# Patient Record
Sex: FEMALE | Race: WHITE
[De-identification: names, ages, dates, MRNs, and addresses within clinical notes are randomized per-mention and may not be internally consistent; named-entity substitution may affect disease eponyms.]

---

## 2018-06-04 ENCOUNTER — HOSPITAL ENCOUNTER (INPATIENT)
Dept: HOSPITAL 74 - JER | Age: 83
LOS: 4 days | Discharge: HOME | DRG: 191 | End: 2018-06-08
Attending: INTERNAL MEDICINE | Admitting: INTERNAL MEDICINE
Payer: COMMERCIAL

## 2018-06-04 VITALS — BODY MASS INDEX: 41.3 KG/M2

## 2018-06-04 DIAGNOSIS — E66.9: ICD-10-CM

## 2018-06-04 DIAGNOSIS — E05.90: ICD-10-CM

## 2018-06-04 DIAGNOSIS — K59.00: ICD-10-CM

## 2018-06-04 DIAGNOSIS — Z79.4: ICD-10-CM

## 2018-06-04 DIAGNOSIS — R60.0: ICD-10-CM

## 2018-06-04 DIAGNOSIS — R06.02: ICD-10-CM

## 2018-06-04 DIAGNOSIS — R10.13: ICD-10-CM

## 2018-06-04 DIAGNOSIS — E11.9: ICD-10-CM

## 2018-06-04 DIAGNOSIS — D72.829: ICD-10-CM

## 2018-06-04 DIAGNOSIS — E78.5: ICD-10-CM

## 2018-06-04 DIAGNOSIS — F03.90: ICD-10-CM

## 2018-06-04 DIAGNOSIS — J45.901: ICD-10-CM

## 2018-06-04 DIAGNOSIS — J44.9: Primary | ICD-10-CM

## 2018-06-04 LAB
ALBUMIN SERPL-MCNC: 2.9 G/DL (ref 3.4–5)
ALP SERPL-CCNC: 164 U/L (ref 45–117)
ALT SERPL-CCNC: 25 U/L (ref 12–78)
ANION GAP SERPL CALC-SCNC: 5 MMOL/L (ref 8–16)
APPEARANCE UR: CLEAR
AST SERPL-CCNC: 19 U/L (ref 15–37)
BASOPHILS # BLD: 0.9 % (ref 0–2)
BILIRUB SERPL-MCNC: 0.6 MG/DL (ref 0.2–1)
BILIRUB UR STRIP.AUTO-MCNC: NEGATIVE MG/DL
BUN SERPL-MCNC: 16 MG/DL (ref 7–18)
CALCIUM SERPL-MCNC: 8.5 MG/DL (ref 8.5–10.1)
CHLORIDE SERPL-SCNC: 105 MMOL/L (ref 98–107)
CO2 SERPL-SCNC: 31 MMOL/L (ref 21–32)
COLOR UR: YELLOW
CREAT SERPL-MCNC: 0.8 MG/DL (ref 0.55–1.02)
DEPRECATED RDW RBC AUTO: 16.1 % (ref 11.6–15.6)
EOSINOPHIL # BLD: 2.7 % (ref 0–4.5)
GLUCOSE SERPL-MCNC: 175 MG/DL (ref 74–106)
HCT VFR BLD CALC: 34.8 % (ref 32.4–45.2)
HGB BLD-MCNC: 11.2 GM/DL (ref 10.7–15.3)
KETONES UR QL STRIP: NEGATIVE
LEUKOCYTE ESTERASE UR QL STRIP.AUTO: NEGATIVE
LIPASE SERPL-CCNC: 62 U/L (ref 73–393)
LYMPHOCYTES # BLD: 15.4 % (ref 8–40)
MCH RBC QN AUTO: 27.4 PG (ref 25.7–33.7)
MCHC RBC AUTO-ENTMCNC: 32.2 G/DL (ref 32–36)
MCV RBC: 85 FL (ref 80–96)
MONOCYTES # BLD AUTO: 3.8 % (ref 3.8–10.2)
NEUTROPHILS # BLD: 77.2 % (ref 42.8–82.8)
NITRITE UR QL STRIP: NEGATIVE
PH UR: 6 [PH] (ref 5–8)
PLATELET # BLD AUTO: 230 K/MM3 (ref 134–434)
PMV BLD: 7.4 FL (ref 7.5–11.1)
POTASSIUM SERPLBLD-SCNC: 4.4 MMOL/L (ref 3.5–5.1)
PROT SERPL-MCNC: 6.3 G/DL (ref 6.4–8.2)
PROT UR QL STRIP: NEGATIVE
PROT UR QL STRIP: NEGATIVE
RBC # BLD AUTO: 4.09 M/MM3 (ref 3.6–5.2)
RBC # UR STRIP: NEGATIVE /UL
SODIUM SERPL-SCNC: 141 MMOL/L (ref 136–145)
SP GR UR: 1.01 (ref 1–1.03)
UROBILINOGEN UR STRIP-MCNC: 2 MG/DL (ref 0.2–1)
WBC # BLD AUTO: 8.7 K/MM3 (ref 4–10)

## 2018-06-04 RX ADMIN — IPRATROPIUM BROMIDE AND ALBUTEROL SULFATE SCH AMP: .5; 3 SOLUTION RESPIRATORY (INHALATION) at 12:36

## 2018-06-04 RX ADMIN — IPRATROPIUM BROMIDE AND ALBUTEROL SULFATE SCH AMP: .5; 3 SOLUTION RESPIRATORY (INHALATION) at 16:41

## 2018-06-04 RX ADMIN — METHYLPREDNISOLONE SODIUM SUCCINATE SCH MG: 40 INJECTION, POWDER, FOR SOLUTION INTRAMUSCULAR; INTRAVENOUS at 18:59

## 2018-06-04 RX ADMIN — IPRATROPIUM BROMIDE AND ALBUTEROL SULFATE SCH AMP: .5; 3 SOLUTION RESPIRATORY (INHALATION) at 12:15

## 2018-06-04 RX ADMIN — IPRATROPIUM BROMIDE AND ALBUTEROL SULFATE SCH AMP: .5; 3 SOLUTION RESPIRATORY (INHALATION) at 12:45

## 2018-06-04 RX ADMIN — RANOLAZINE SCH MG: 500 TABLET, FILM COATED, EXTENDED RELEASE ORAL at 23:21

## 2018-06-04 RX ADMIN — IPRATROPIUM BROMIDE AND ALBUTEROL SULFATE SCH AMP: .5; 3 SOLUTION RESPIRATORY (INHALATION) at 13:10

## 2018-06-04 RX ADMIN — INSULIN ASPART SCH UNIT: 100 INJECTION, SOLUTION INTRAVENOUS; SUBCUTANEOUS at 17:34

## 2018-06-04 RX ADMIN — HEPARIN SODIUM SCH UNIT: 5000 INJECTION, SOLUTION INTRAVENOUS; SUBCUTANEOUS at 23:20

## 2018-06-04 RX ADMIN — INSULIN ASPART SCH UNIT: 100 INJECTION, SOLUTION INTRAVENOUS; SUBCUTANEOUS at 23:21

## 2018-06-04 RX ADMIN — IPRATROPIUM BROMIDE AND ALBUTEROL SULFATE SCH AMP: .5; 3 SOLUTION RESPIRATORY (INHALATION) at 21:00

## 2018-06-04 NOTE — HP
Admitting History and Physical





- Primary Care Physician


PCP: Virgilio Neumann





- Admission


Chief Complaint: sob and coughing worsening for last 2 weeks


History of Present Illness: 





Ms. Balderas is an 81 yo female w/ pmh of HTN, HLD, COPD, DM, asthma, 

hypothyroidism who presents complaining of 2 week history of shortness of 

breath she describes "feels like she is drowning." She endorses additional 

yellow sputum and nasal congestion. Ms. Balderas reports she had gone to PCP 

friday, however represented today as she was still having symptoms


per family member she was seen by PCP a couple of times in last 2 weeks and 

after her last visit when family did not see improvement brought her to ER


they report worsening cough and shortness of breath





In the ER


patient got


zithromax iv


magnesium iv,


duonebs


prednisone 60mg


History Source: Family Member


Limitations to Obtaining History: Language Barrier





- Past Medical History


Cardiovascular: Yes: HTN, Hyperlipdemia


Pulmonary: Yes: COPD


Endocrine: Yes: Diabetes Mellitus, Hyperthyroidism





- Smoking History


Smoking history: Unknown if ever smoked


Aproximately how many cigarettes per day: 0





- Alcohol/Substance Use


Hx Alcohol Use: No





Home Medications





- Allergies


Allergies/Adverse Reactions: 


 Allergies











Allergy/AdvReac Type Severity Reaction Status Date / Time


 


No Known Allergies Allergy   Verified 06/04/18 11:00














- Home Medications


Home Medications: 


Ambulatory Orders





Aspirin [ASA -] 81 mg PO DAILY 02/23/18 


Esomeprazole Magnesium 40 mg PO DAILY 02/23/18 


Ezetimibe [Zetia] 10 mg PO DAILY 02/23/18 


Glimepiride [Amaryl -] 2 mg PO BID 02/23/18 


Insulin Glargine,Hum.rec.anlog [Lantus] 38 unit SQ DAILY 02/23/18 


Memantine HCl [Namenda Xr] 28 mg PO DAILY 02/23/18 


Methimazole [Tapazole -] 10 mg PO DAILY 02/23/18 


Naproxen/Esomeprazole Mag [Vimovo Dr 500-20 mg Tablet] 1 each PO BID 02/23/18 


Pitavastatin Calcium [Livalo] 4 mg PO DAILY 02/23/18 


Ranolazine [Ranexa] 1,000 mg PO BID 02/23/18 


Simvastatin [Zocor -] 20 mg PO HS 02/23/18 


Valsartan [Diovan] 160 mg PO DAILY 02/23/18 











Review of Systems





- Review of Systems


Respiratory: reports: Cough, SOB





Physical Examination


Vital Signs: 


 Vital Signs











Temperature  98.3 F   06/04/18 11:00


 


Pulse Rate  61   06/04/18 14:25


 


Respiratory Rate  18   06/04/18 14:25


 


Blood Pressure  121/98   06/04/18 14:25


 


O2 Sat by Pulse Oximetry (%)  99   06/04/18 14:25











Constitutional: Yes: Calm


Neck: Yes: Trachea Midline


Cardiovascular: Yes: Regular Rate and Rhythm, S1, S2


Respiratory: Yes: Rhonchi, Wheezes


Gastrointestinal: Yes: Normal Bowel Sounds, Soft


Edema: Yes


Neurological: Yes: Alert, Oriented


Labs: 


 CBC, BMP





 06/04/18 12:36 





 06/04/18 12:04 











Imaging





- Results


X-ray: Report Reviewed (cardiomegaly)





Problem List





- Problems


(1) Shortness of breath


Assessment/Plan: 


asthma exacerbation


iv steroids


abx


duonebs


oxygen as needed


pulm eval


Code(s): R06.02 - SHORTNESS OF BREATH   





(2) Diabetes


Assessment/Plan: 


bgm


sliding scale


amaryl and levemir


Code(s): E11.9 - TYPE 2 DIABETES MELLITUS WITHOUT COMPLICATIONS   


Qualifiers: 


   Diabetes mellitus type: type 2 





(3) HLD (hyperlipidemia)


Assessment/Plan: 


zetia


check lipid panel


Code(s): E78.5 - HYPERLIPIDEMIA, UNSPECIFIED   





(4) Hyperthyroidism


Assessment/Plan: 


tapazole


cehck tsh and free t4


Code(s): E05.90 - THYROTOXICOSIS, UNSP WITHOUT THYROTOXIC CRISIS OR STORM

## 2018-06-04 NOTE — PDOC
Attending Attestation





- Resident


Resident Name: Dawson Arteaga





- ED Attending Attestation


I have performed the following: I have examined & evaluated the patient, The 

case was reviewed & discussed with the resident, I agree w/resident's findings 

& plan, Exceptions are as noted





- HPI


HPI: 





06/04/18 13:05





82-year-old female patient with history of hypertension, diabetes, 

hyperlipidemia, COPD, asthma, thyroid disorder sent in by primary care physician

, Dr. Neumann, for chest tightness, weakness, shortness of breath. Patient 

reports 2 weeks of symptoms and states that she's been feeling generally 

unwell. Denies fevers or chills but endorses a yellow productive cough that is 

progressively worsened. Patient states that she becomes very short of breath so 

came to the ER for an evaluation.





- Physicial Exam


PE: 





06/04/18 13:05





GENERAL: Awake, alert, and fully oriented, in no acute distress. 


HEAD: No signs of trauma


EYES: EOMI, sclera anicteric, conjunctiva clear


ENT: Auricles normal inspection, hearing grossly normal, nares patent


NECK: Normal ROM, supple


LUNGS: Diffuse expiratory wheezing bilaterally. Occasional ronchorous breath 

sounds bilaterally.


HEART: Regular rate and rhythm, normal S1 and S2, no murmurs, rubs or gallops


EXTREMITIES: Normal range of motion, no edema.  No clubbing or cyanosis. No 

cords, erythema, or tenderness


NEUROLOGICAL: Cranial nerves II through XII grossly intact.  Normal speech,


SKIN: Warm, Dry, normal turgor, no rashes or lesions noted.





- Medical Decision Making





06/04/18 13:07





 Vital Signs











Temp Pulse Resp BP Pulse Ox


 


 98.3 F   62   14   175/74   100 


 


 06/04/18 11:00  06/04/18 11:57  06/04/18 11:57  06/04/18 11:57  06/04/18 11:57








I suspect this is likely having a COPD exacerbation. duonebs, prednisone, IV 

magnesium. We'll likely need to initiate IV antibiotics. Chest x-ray to rule 

out pneumonia. We'll need to admit the patient for further evaluation.


06/04/18 14:34





 CBC, BMP





 06/04/18 12:36 





 06/04/18 12:04 





 CMP











Sodium  141 mmol/L (136-145)   06/04/18  12:04    


 


Potassium  4.4 mmol/L (3.5-5.1)   06/04/18  12:04    


 


Chloride  105 mmol/L ()   06/04/18  12:04    


 


Carbon Dioxide  31 mmol/L (21-32)   06/04/18  12:04    


 


Anion Gap  5  (8-16)  L  06/04/18  12:04    


 


BUN  16 mg/dL (7-18)   06/04/18  12:04    


 


Creatinine  0.8 mg/dL (0.55-1.02)   06/04/18  12:04    


 


Creat Clearance w eGFR  > 60  (>60)   06/04/18  12:04    


 


Random Glucose  175 mg/dL ()  H  06/04/18  12:04    


 


Calcium  8.5 mg/dL (8.5-10.1)   06/04/18  12:04    


 


Total Bilirubin  0.6 mg/dL (0.2-1.0)   06/04/18  12:04    


 


AST  19 U/L (15-37)   06/04/18  12:04    


 


ALT  25 U/L (12-78)   06/04/18  12:04    


 


Alkaline Phosphatase  164 U/L ()  H  06/04/18  12:04    


 


Creatine Kinase  45 IU/L ()   06/04/18  12:04    


 


Troponin I  0.05 ng/ml (0.00-0.05)   06/04/18  12:04    


 


Total Protein  6.3 g/dl (6.4-8.2)  L  06/04/18  12:04    


 


Albumin  2.9 g/dl (3.4-5.0)  L  06/04/18  12:04    


 


Lipase  62 U/L ()  L  06/04/18  12:04    








Chest xray reviewed. No infiltrates.





**Heart Score/ECG Review


  ** #1


ECG reviewed & interpreted by me at: 11:45





06/04/18 13:06


NSR 62, no std/tyler, normal axis, normal intervals, Q wave V1-V2,  msec

## 2018-06-04 NOTE — PDOC
History of Present Illness





- General


Chief Complaint: Shortness of Breath


Stated Complaint: SOB (PCP SENT)


Time Seen by Provider: 06/04/18 11:44





- History of Present Illness


Initial Comments: 





06/04/18 12:34


Ms. Balderas is an 81 yo female w/ pmh of HTN, HLD, COPD, DM, asthma, 

hypothroidism who presents complaining of 2 week history of shortness of breath 

she describes "feels like she is drowning." She endorses additional yellow 

sputum and nasal congestion. Ms. Balderas reports she had gone to PCP friday, 

however represented today as she was still having symptoms.





The patient denies chest pain, headache and dizziness. Denies fever, chills, 

nausea, vomit, diarrhea and constipation. Denies dysuria, frequency, urgency 

and hematuria. 


Allergies: NKDA





Past History





- Past Medical History


Allergies/Adverse Reactions: 


 Allergies











Allergy/AdvReac Type Severity Reaction Status Date / Time


 


No Known Allergies Allergy   Verified 06/04/18 11:00











Home Medications: 


Ambulatory Orders





Aspirin [ASA -] 81 mg PO DAILY 02/23/18 


Esomeprazole Magnesium 40 mg PO DAILY 02/23/18 


Ezetimibe [Zetia] 10 mg PO DAILY 02/23/18 


Glimepiride [Amaryl -] 2 mg PO BID 02/23/18 


Insulin Glargine,Hum.rec.anlog [Lantus] 38 unit SQ DAILY 02/23/18 


Memantine HCl [Namenda Xr] 28 mg PO DAILY 02/23/18 


Methimazole [Tapazole -] 10 mg PO DAILY 02/23/18 


Naproxen/Esomeprazole Mag [Vimovo Dr 500-20 mg Tablet] 1 each PO BID 02/23/18 


Pitavastatin Calcium [Livalo] 4 mg PO DAILY 02/23/18 


Ranolazine [Ranexa] 1,000 mg PO BID 02/23/18 


Simvastatin [Zocor -] 20 mg PO HS 02/23/18 


Valsartan [Diovan] 160 mg PO DAILY 02/23/18 








COPD: No


Dementia: Yes


Diabetes: Yes


HTN: Yes


Hypercholesterolemia: Yes





- Suicide/Smoking/Psychosocial Hx


Smoking Status: No


Smoking History: Unknown if ever smoked


Number of Cigarettes Smoked Daily: 0


Hx Alcohol Use: No


Drug/Substance Use Hx: No


Substance Use Type: None





**Review of Systems





- Review of Systems


Comments:: 





06/04/18 14:13


GENERAL/CONSTITUTIONAL: No fever or chills. No weakness.


HEAD, EYES, EARS, NOSE AND THROAT: No change in vision. No ear pain or 

discharge. No sore throat.


CARDIOVASCULAR: +SOB as described. No chest pain


RESPIRATORY: +Cough productive of yellow sputum. No wheezing, or hemoptysis.


GASTROINTESTINAL: No nausea, vomiting, diarrhea or constipation.


GENITOURINARY: No dysuria, frequency, or change in urination.


MUSCULOSKELETAL: No joint or muscle swelling or pain. No neck or back pain.


SKIN: No rash


NEUROLOGIC: No headache, vertigo, loss of consciousness, or change in strength/

sensation.


ENDOCRINE: No increased thirst. No abnormal weight change


HEMATOLOGIC/LYMPHATIC: No anemia, easy bleeding, or history of blood clots.


ALLERGIC/IMMUNOLOGIC: No hives or skin allergy.





*Physical Exam





- Vital Signs


 Last Vital Signs











Temp Pulse Resp BP Pulse Ox


 


 98.3 F   66   23   155/66   95 


 


 06/04/18 11:00  06/04/18 11:00  06/04/18 11:00  06/04/18 11:00  06/04/18 11:00














- Physical Exam


Comments: 





06/04/18 14:14


GENERAL: Awake, alert, and fully oriented, in no acute distress


HEAD: No signs of trauma, normocephalic, atraumatic 


EYES: PERRLA, EOMI, sclera anicteric, conjunctiva clear


ENT: Auricles normal inspection, hearing grossly normal, nares patent, 

oropharynx clear without


exudates. Moist mucosa


NECK: Normal ROM, supple, no lymphadenopathy, JVD, or masses


LUNGS: +Diffusely wheezy. Speaks full sentences


HEART: Regular rate and rhythm, normal S1 and S2, no murmurs, rubs or gallops, 

peripheral pulses normal and equal bilaterally. 


ABDOMEN: Soft, nontender, normoactive bowel sounds. No guarding, no rebound. No 

masses


EXTREMITIES: Normal inspection, Normal range of motion, no edema. No clubbing 

or cyanosis. 


NEUROLOGICAL: Cranial nerves II through XII grossly intact. Normal speech, 

normal gait, no focal sensorimotor deficits 


SKIN: Warm, Dry, normal turgor, no rashes or lesions noted. 





ED Treatment Course





- LABORATORY


CBC & Chemistry Diagram: 


 06/04/18 12:36





 06/04/18 12:04





Medical Decision Making





- Medical Decision Making





06/04/18 14:15


Ms. Balderas is an 81 yo female w/ pmh as described who presents for evaluation 

of shortness of breath with congestion. Patient somewhat improved with 

supplemental oxygen.





06/04/18 14:41


Labs as below. Patient does not have home O2. Suspect COPD exacerbation as 

origin of symptoms. Admitting patient for further evaluation.








 Laboratory Results - last 24 hr











  06/04/18 06/04/18 06/04/18





  12:04 12:04 12:36


 


WBC    8.7


 


RBC    4.09


 


Hgb    11.2  D


 


Hct    34.8


 


MCV    85.0


 


MCH    27.4  D


 


MCHC    32.2


 


RDW    16.1 H


 


Plt Count    230  D


 


MPV    7.4 L


 


Absolute Neuts (auto)    6.7


 


Neutrophils %    77.2


 


Lymphocytes %    15.4  D


 


Monocytes %    3.8


 


Eosinophils %    2.7


 


Basophils %    0.9


 


Nucleated RBC %    0


 


Sodium  141  


 


Potassium  4.4  


 


Chloride  105  


 


Carbon Dioxide  31  


 


Anion Gap  5 L  


 


BUN  16  


 


Creatinine  0.8  


 


Creat Clearance w eGFR  > 60  


 


Random Glucose  175 H  


 


Calcium  8.5  


 


Total Bilirubin  0.6  


 


AST  19  


 


ALT  25  


 


Alkaline Phosphatase  164 H  


 


Creatine Kinase   45 


 


Troponin I   0.05 


 


Total Protein  6.3 L  


 


Albumin  2.9 L  


 


Lipase  62 L  














*DC/Admit/Observation/Transfer


Diagnosis at time of Disposition: 


 Shortness of breath








- Discharge Dispostion


Decision to Admit order: Yes





- Referrals


Referrals: 


Virgilio Neumann MD [Primary Care Provider] - 





- Patient Instructions





- Post Discharge Activity

## 2018-06-04 NOTE — EKG
Test Reason : 

Blood Pressure : ***/*** mmHG

Vent. Rate : 062 BPM     Atrial Rate : 062 BPM

   P-R Int : 154 ms          QRS Dur : 090 ms

    QT Int : 446 ms       P-R-T Axes : 057 -04 058 degrees

   QTc Int : 452 ms

 

SINUS RHYTHM WITH OCCASIONAL PREMATURE VENTRICULAR COMPLEXES

SEPTAL INFARCT (CITED ON OR BEFORE 23-FEB-2018)

ABNORMAL ECG

WHEN COMPARED WITH ECG OF 23-FEB-2018 13:20,

PREMATURE VENTRICULAR COMPLEXES ARE NOW PRESENT

Confirmed by DIANNE MIRANDA MD ([id number]) on 6/4/2018 12:28:36 PM

 

Referred By:             Confirmed By:DIANNE MIRANDA MD

## 2018-06-05 LAB
ALBUMIN SERPL-MCNC: 2.8 G/DL (ref 3.4–5)
ALP SERPL-CCNC: 183 U/L (ref 45–117)
ALT SERPL-CCNC: 40 U/L (ref 12–78)
ANION GAP SERPL CALC-SCNC: 10 MMOL/L (ref 8–16)
AST SERPL-CCNC: 30 U/L (ref 15–37)
BASOPHILS # BLD: 0.3 % (ref 0–2)
BILIRUB SERPL-MCNC: 0.6 MG/DL (ref 0.2–1)
BNP SERPL-MCNC: 4051.07 PG/ML (ref 5–450)
BUN SERPL-MCNC: 22 MG/DL (ref 7–18)
CALCIUM SERPL-MCNC: 8.8 MG/DL (ref 8.5–10.1)
CHLORIDE SERPL-SCNC: 103 MMOL/L (ref 98–107)
CHOLEST SERPL-MCNC: 96 MG/DL (ref 50–200)
CO2 SERPL-SCNC: 26 MMOL/L (ref 21–32)
CREAT SERPL-MCNC: 0.7 MG/DL (ref 0.55–1.02)
DEPRECATED RDW RBC AUTO: 15.8 % (ref 11.6–15.6)
EOSINOPHIL # BLD: 0 % (ref 0–4.5)
GLUCOSE SERPL-MCNC: 217 MG/DL (ref 74–106)
HCT VFR BLD CALC: 34.1 % (ref 32.4–45.2)
HDLC SERPL-MCNC: 35 MG/DL (ref 40–60)
HGB BLD-MCNC: 10.9 GM/DL (ref 10.7–15.3)
LYMPHOCYTES # BLD: 6.6 % (ref 8–40)
MAGNESIUM SERPL-MCNC: 2.1 MG/DL (ref 1.8–2.4)
MCH RBC QN AUTO: 27.7 PG (ref 25.7–33.7)
MCHC RBC AUTO-ENTMCNC: 32.1 G/DL (ref 32–36)
MCV RBC: 86.3 FL (ref 80–96)
MONOCYTES # BLD AUTO: 0.5 % (ref 3.8–10.2)
NEUTROPHILS # BLD: 92.6 % (ref 42.8–82.8)
PHOSPHATE SERPL-MCNC: 3.6 MG/DL (ref 2.5–4.9)
PLATELET # BLD AUTO: 206 K/MM3 (ref 134–434)
PLATELET BLD QL SMEAR: NORMAL
PMV BLD: 7.5 FL (ref 7.5–11.1)
POTASSIUM SERPLBLD-SCNC: 4.6 MMOL/L (ref 3.5–5.1)
PROT SERPL-MCNC: 6.3 G/DL (ref 6.4–8.2)
RBC # BLD AUTO: 3.95 M/MM3 (ref 3.6–5.2)
SODIUM SERPL-SCNC: 139 MMOL/L (ref 136–145)
TRIGL SERPL-MCNC: 63 MG/DL (ref 35–160)
WBC # BLD AUTO: 7.4 K/MM3 (ref 4–10)

## 2018-06-05 RX ADMIN — METHYLPREDNISOLONE SODIUM SUCCINATE SCH MG: 40 INJECTION, POWDER, FOR SOLUTION INTRAMUSCULAR; INTRAVENOUS at 10:52

## 2018-06-05 RX ADMIN — PANTOPRAZOLE SODIUM SCH MG: 40 TABLET, DELAYED RELEASE ORAL at 08:25

## 2018-06-05 RX ADMIN — IPRATROPIUM BROMIDE AND ALBUTEROL SULFATE SCH AMP: .5; 3 SOLUTION RESPIRATORY (INHALATION) at 11:05

## 2018-06-05 RX ADMIN — IPRATROPIUM BROMIDE AND ALBUTEROL SULFATE SCH AMP: .5; 3 SOLUTION RESPIRATORY (INHALATION) at 16:05

## 2018-06-05 RX ADMIN — INSULIN ASPART SCH UNIT: 100 INJECTION, SOLUTION INTRAVENOUS; SUBCUTANEOUS at 06:41

## 2018-06-05 RX ADMIN — AZITHROMYCIN DIHYDRATE SCH MLS/HR: 500 INJECTION, POWDER, LYOPHILIZED, FOR SOLUTION INTRAVENOUS at 08:24

## 2018-06-05 RX ADMIN — INSULIN DETEMIR SCH UNITS: 100 INJECTION, SOLUTION SUBCUTANEOUS at 23:24

## 2018-06-05 RX ADMIN — METHIMAZOLE SCH MG: 10 TABLET ORAL at 14:56

## 2018-06-05 RX ADMIN — INSULIN ASPART SCH UNIT: 100 INJECTION, SOLUTION INTRAVENOUS; SUBCUTANEOUS at 16:31

## 2018-06-05 RX ADMIN — EZETIMIBE SCH MG: 10 TABLET ORAL at 08:25

## 2018-06-05 RX ADMIN — ASPIRIN 81 MG SCH MG: 81 TABLET ORAL at 10:54

## 2018-06-05 RX ADMIN — GLIMEPIRIDE SCH MG: 2 TABLET ORAL at 06:41

## 2018-06-05 RX ADMIN — IPRATROPIUM BROMIDE AND ALBUTEROL SULFATE SCH AMP: .5; 3 SOLUTION RESPIRATORY (INHALATION) at 08:32

## 2018-06-05 RX ADMIN — RANOLAZINE SCH MG: 500 TABLET, FILM COATED, EXTENDED RELEASE ORAL at 22:12

## 2018-06-05 RX ADMIN — DOCUSATE SODIUM SCH MG: 100 CAPSULE, LIQUID FILLED ORAL at 22:12

## 2018-06-05 RX ADMIN — METHYLPREDNISOLONE SODIUM SUCCINATE SCH MG: 40 INJECTION, POWDER, FOR SOLUTION INTRAMUSCULAR; INTRAVENOUS at 02:00

## 2018-06-05 RX ADMIN — INSULIN ASPART SCH UNIT: 100 INJECTION, SOLUTION INTRAVENOUS; SUBCUTANEOUS at 22:12

## 2018-06-05 RX ADMIN — MEMANTINE SCH MG: 10 TABLET ORAL at 08:25

## 2018-06-05 RX ADMIN — HEPARIN SODIUM SCH UNIT: 5000 INJECTION, SOLUTION INTRAVENOUS; SUBCUTANEOUS at 22:12

## 2018-06-05 RX ADMIN — HEPARIN SODIUM SCH UNIT: 5000 INJECTION, SOLUTION INTRAVENOUS; SUBCUTANEOUS at 14:56

## 2018-06-05 RX ADMIN — INSULIN ASPART SCH UNIT: 100 INJECTION, SOLUTION INTRAVENOUS; SUBCUTANEOUS at 11:36

## 2018-06-05 RX ADMIN — VALSARTAN SCH MG: 160 TABLET, FILM COATED ORAL at 08:25

## 2018-06-05 RX ADMIN — METHYLPREDNISOLONE SODIUM SUCCINATE SCH MG: 40 INJECTION, POWDER, FOR SOLUTION INTRAMUSCULAR; INTRAVENOUS at 18:28

## 2018-06-05 RX ADMIN — RANOLAZINE SCH MG: 500 TABLET, FILM COATED, EXTENDED RELEASE ORAL at 10:53

## 2018-06-05 RX ADMIN — IPRATROPIUM BROMIDE AND ALBUTEROL SULFATE SCH AMP: .5; 3 SOLUTION RESPIRATORY (INHALATION) at 20:25

## 2018-06-05 RX ADMIN — POLYETHYLENE GLYCOL 3350 SCH GM: 17 POWDER, FOR SOLUTION ORAL at 16:27

## 2018-06-05 RX ADMIN — HEPARIN SODIUM SCH UNIT: 5000 INJECTION, SOLUTION INTRAVENOUS; SUBCUTANEOUS at 06:40

## 2018-06-05 NOTE — CON.PULM
Consult


Consult Specialty:: PULMONARY


Referred by:: Dr. Patel


Reason for Consultation:: shortness of breath





- History of Present Illness


Chief Complaint: shortness of breath


History of Present Illness: 





81yo female with h/o HTN, DM, hyperlipidemia, asthma/COPD, hyperthyroidism who 

was admitted with worsening shortness of breath. Reports cough productive of 

yellow sputum and wheezing. No fevers, chills or sweats. No chest pain or 

palpitations. No infiltrates noted on CXR, received antibiotics, prednisone and 

inhaled bronchodilators with some improvement in her symptoms. She is a remote 

smoker. 








- History Source


History Provided By: Patient, Medical Record


Limitations to Obtaining History: Language Barrier





- Past Medical History


Cardio/Vascular: Yes: HTN, Hyperlipdemia


Pulmonary: Yes: COPD


Endocrine: Yes: Diabetes Mellitus, Hyperthyroidism





- Alcohol/Substance Use


Hx Alcohol Use: No





- Smoking History


Smoking history: Never smoked


Have you smoked in the past 12 months: No


Aproximately how many cigarettes per day: 0





Home Medications





- Allergies


Allergies/Adverse Reactions: 


 Allergies











Allergy/AdvReac Type Severity Reaction Status Date / Time


 


No Known Allergies Allergy   Verified 06/04/18 11:00














- Home Medications


Home Medications: 


Ambulatory Orders





Aspirin [ASA -] 81 mg PO DAILY 02/23/18 


Esomeprazole Magnesium 40 mg PO DAILY 02/23/18 


Ezetimibe [Zetia] 10 mg PO DAILY 02/23/18 


Glimepiride [Amaryl -] 2 mg PO BID 02/23/18 


Insulin Glargine,Hum.rec.anlog [Lantus] 38 unit SQ DAILY 02/23/18 


Memantine HCl [Namenda Xr] 28 mg PO DAILY 02/23/18 


Methimazole [Tapazole -] 10 mg PO DAILY 02/23/18 


Naproxen/Esomeprazole Mag [Vimovo Dr 500-20 mg Tablet] 1 each PO BID 02/23/18 


Pitavastatin Calcium [Livalo] 4 mg PO DAILY 02/23/18 


Ranolazine [Ranexa] 1,000 mg PO BID 02/23/18 


Simvastatin [Zocor -] 20 mg PO HS 02/23/18 


Valsartan [Diovan] 160 mg PO DAILY 02/23/18 











Review of Systems





- Review of Systems


Constitutional: reports: Weakness.  denies: Chills, Fever


Eyes: denies: Recent Change in Vision


HENT: denies: Nasal Congestion, Throat Pain


Neck: denies: Stiffness


Cardiovascular: reports: Shortness of Breath.  denies: Chest Pain, Edema, 

Palpitations


Respiratory: reports: Cough, SOB on Exertion, Wheezing.  denies: Hemoptysis


Gastrointestinal: denies: Abdominal Pain, Nausea, Vomiting


Genitourinary: denies: Dysuria, Hematuria


Neurological: denies: Dizziness, Headache


Endocrine: denies: Unexplained Weight Loss





Physical Exam


Vital Sings: 


 Vital Signs











Temperature  96.5 F L  06/05/18 09:39


 


Pulse Rate  63   06/05/18 09:39


 


Respiratory Rate  18   06/05/18 09:39


 


Blood Pressure  148/72   06/05/18 09:39


 


O2 Sat by Pulse Oximetry (%)  96   06/04/18 21:27











Constitutional: Yes: Calm


Eyes: Yes: Conjunctiva Clear, EOM Intact


HENT: Yes: Atraumatic, Normocephalic


Neck: Yes: Supple, Trachea Midline


Cardiovascular: Yes: Regular Rate and Rhythm


Respiratory: Yes: Rhonchi, Wheezes


...Clubbing: No


Gastrointestinal: Yes: Normal Bowel Sounds, Soft.  No: Tenderness


Edema: Yes


Labs: 


 CBC, BMP





 06/05/18 07:30 





 06/05/18 07:30 











Imaging





- Results


Chest X-ray: Report Reviewed, Image Reviewed (no infiltrates)





Problem List





- Problems


(1) Acute asthma exacerbation


Code(s): J45.901 - UNSPECIFIED ASTHMA WITH (ACUTE) EXACERBATION   





(2) Diabetes


Code(s): E11.9 - TYPE 2 DIABETES MELLITUS WITHOUT COMPLICATIONS   


Qualifiers: 


   Diabetes mellitus type: type 2 





(3) HLD (hyperlipidemia)


Code(s): E78.5 - HYPERLIPIDEMIA, UNSPECIFIED   





(4) Hyperthyroidism


Code(s): E05.90 - THYROTOXICOSIS, UNSP WITHOUT THYROTOXIC CRISIS OR STORM   





Assessment/Plan





Acute Asthma Exacerbation


Hyperthyroidism


DM


Hyperlipidemia





-  agree with IV medrol


-  inhaled bronchdilators standing and PRN


-  O2 to keep SpO2 >90%


-  glucose control while on systemic steroids


-  DVT prophylaxis





Thank you for this consult


Vini Grant MD

## 2018-06-05 NOTE — PN
Progress Note, Physician





- Current Medication List


Current Medications: 


Active Medications





Albuterol Sulfate (Ventolin 0.083% Nebulizer Soln -)  1 amp NEB Q4H PRN


   PRN Reason: SHORT OF BREATH/WHEEZING


Albuterol/Ipratropium (Duoneb -)  1 amp NEB RQID [facility]


   Last Admin: 06/05/18 08:32 Dose:  1 amp


Aspirin (Asa -)  81 mg PO DAILY [facility]


   Last Admin: 06/05/18 10:54 Dose:  81 mg


Ezetimibe (Zetia -)  10 mg PO DAILY@0800 [facility]


   Last Admin: 06/05/18 08:25 Dose:  10 mg


Glimepiride (Amaryl -)  2 mg PO DAILY@0700 [facility]


   Last Admin: 06/05/18 06:41 Dose:  2 mg


Heparin Sodium (Porcine) (Heparin -)  5,000 unit SQ TID [facility]


   Last Admin: 06/05/18 06:40 Dose:  5,000 unit


Azithromycin 500 mg/ Dextrose  250 mls @ 250 mls/hr IVPB DAILY@0800 [facility]


   Last Admin: 06/05/18 08:24 Dose:  250 mls/hr


Insulin Aspart (Novolog Vial Sliding Scale -)  1 vial SQ ACHS [facility]; Protocol


   Last Admin: 06/05/18 11:36 Dose:  6 unit


Memantine (Namenda -)  10 mg PO DAILY@0800 [facility]


   Last Admin: 06/05/18 08:25 Dose:  10 mg


Methimazole (Tapazole -)  10 mg PO DAILY [facility]


Methylprednisolone Sodium Succinate (Solu-Medrol -)  40 mg IVPUSH Q8H-IV [facility]


   Last Admin: 06/05/18 10:52 Dose:  40 mg


Pantoprazole Sodium (Protonix -)  40 mg PO DAILY@0800 [facility]


   Last Admin: 06/05/18 08:25 Dose:  40 mg


Ranolazine (Ranexa -)  1,000 mg PO BID [facility]


   Last Admin: 06/05/18 10:53 Dose:  1,000 mg


Valsartan (Diovan -)  160 mg PO DAILY@0800 [facility]


   Last Admin: 06/05/18 08:25 Dose:  160 mg











- Objective


Vital Signs: 


 Vital Signs











Temperature  96.5 F L  06/05/18 09:39


 


Pulse Rate  63   06/05/18 09:39


 


Respiratory Rate  18   06/05/18 09:39


 


Blood Pressure  148/72   06/05/18 09:39


 


O2 Sat by Pulse Oximetry (%)  95   06/05/18 09:00











Constitutional: Yes: Calm


Cardiovascular: Yes: Regular Rate and Rhythm, S1, S2


Respiratory: Yes: Rhonchi, Wheezes


Gastrointestinal: Yes: Normal Bowel Sounds, Soft


Edema: Yes


Neurological: Yes: Alert, Oriented


Labs: 


 CBC, BMP





 06/05/18 07:30 





 06/05/18 07:30 











Problem List





- Problems


(1) Shortness of breath


Assessment/Plan: 


asthma exacerbation


iv steroids


abx


duonebs


oxygen as needed


pulm on board


Code(s): R06.02 - SHORTNESS OF BREATH   





(2) Diabetes


Assessment/Plan: 


bgm


sliding scale


amaryl and levemir


Code(s): E11.9 - TYPE 2 DIABETES MELLITUS WITHOUT COMPLICATIONS   


Qualifiers: 


   Diabetes mellitus type: type 2 





(3) HLD (hyperlipidemia)


Assessment/Plan: 


zetia


check lipid panel


Code(s): E78.5 - HYPERLIPIDEMIA, UNSPECIFIED   





(4) Hyperthyroidism


Assessment/Plan: 


tapazole


cehck tsh and free t4


Code(s): E05.90 - THYROTOXICOSIS, UNSP WITHOUT THYROTOXIC CRISIS OR STORM   





(5) Constipation


Assessment/Plan: 


miralax


colace


Code(s): K59.00 - CONSTIPATION, UNSPECIFIED   





(6) Leg edema


Assessment/Plan: 


iv lasix one dose


venous doppler


Code(s): R60.0 - LOCALIZED EDEMA

## 2018-06-06 LAB
ALBUMIN SERPL-MCNC: 3 G/DL (ref 3.4–5)
ALP SERPL-CCNC: 180 U/L (ref 45–117)
ALT SERPL-CCNC: 57 U/L (ref 12–78)
ANION GAP SERPL CALC-SCNC: 5 MMOL/L (ref 8–16)
ANION GAP SERPL CALC-SCNC: 5 MMOL/L (ref 8–16)
AST SERPL-CCNC: 50 U/L (ref 15–37)
BASOPHILS # BLD: 0 % (ref 0–2)
BILIRUB SERPL-MCNC: 0.5 MG/DL (ref 0.2–1)
BUN SERPL-MCNC: 26 MG/DL (ref 7–18)
BUN SERPL-MCNC: 28 MG/DL (ref 7–18)
CALCIUM SERPL-MCNC: 9.2 MG/DL (ref 8.5–10.1)
CALCIUM SERPL-MCNC: 9.2 MG/DL (ref 8.5–10.1)
CHLORIDE SERPL-SCNC: 98 MMOL/L (ref 98–107)
CHLORIDE SERPL-SCNC: 99 MMOL/L (ref 98–107)
CO2 SERPL-SCNC: 33 MMOL/L (ref 21–32)
CO2 SERPL-SCNC: 34 MMOL/L (ref 21–32)
CREAT SERPL-MCNC: 0.8 MG/DL (ref 0.55–1.02)
CREAT SERPL-MCNC: 0.9 MG/DL (ref 0.55–1.02)
DEPRECATED RDW RBC AUTO: 15.7 % (ref 11.6–15.6)
EOSINOPHIL # BLD: 0 % (ref 0–4.5)
GLUCOSE SERPL-MCNC: 285 MG/DL (ref 74–106)
GLUCOSE SERPL-MCNC: 322 MG/DL (ref 74–106)
HCT VFR BLD CALC: 34.1 % (ref 32.4–45.2)
HGB BLD-MCNC: 11 GM/DL (ref 10.7–15.3)
LYMPHOCYTES # BLD: 4.7 % (ref 8–40)
MCH RBC QN AUTO: 27.7 PG (ref 25.7–33.7)
MCHC RBC AUTO-ENTMCNC: 32.4 G/DL (ref 32–36)
MCV RBC: 85.6 FL (ref 80–96)
MONOCYTES # BLD AUTO: 1.3 % (ref 3.8–10.2)
NEUTROPHILS # BLD: 94 % (ref 42.8–82.8)
PLATELET # BLD AUTO: 229 K/MM3 (ref 134–434)
PMV BLD: 7.9 FL (ref 7.5–11.1)
POTASSIUM SERPLBLD-SCNC: 4.9 MMOL/L (ref 3.5–5.1)
POTASSIUM SERPLBLD-SCNC: 5.2 MMOL/L (ref 3.5–5.1)
PROT SERPL-MCNC: 6.6 G/DL (ref 6.4–8.2)
RBC # BLD AUTO: 3.98 M/MM3 (ref 3.6–5.2)
SODIUM SERPL-SCNC: 136 MMOL/L (ref 136–145)
SODIUM SERPL-SCNC: 138 MMOL/L (ref 136–145)
WBC # BLD AUTO: 10.1 K/MM3 (ref 4–10)

## 2018-06-06 RX ADMIN — IPRATROPIUM BROMIDE AND ALBUTEROL SULFATE SCH AMP: .5; 3 SOLUTION RESPIRATORY (INHALATION) at 20:25

## 2018-06-06 RX ADMIN — DOCUSATE SODIUM SCH MG: 100 CAPSULE, LIQUID FILLED ORAL at 21:59

## 2018-06-06 RX ADMIN — RANOLAZINE SCH MG: 500 TABLET, FILM COATED, EXTENDED RELEASE ORAL at 22:00

## 2018-06-06 RX ADMIN — AZITHROMYCIN DIHYDRATE SCH MLS/HR: 500 INJECTION, POWDER, LYOPHILIZED, FOR SOLUTION INTRAVENOUS at 08:27

## 2018-06-06 RX ADMIN — METHIMAZOLE SCH MG: 10 TABLET ORAL at 09:49

## 2018-06-06 RX ADMIN — METHYLPREDNISOLONE SODIUM SUCCINATE SCH MG: 40 INJECTION, POWDER, FOR SOLUTION INTRAMUSCULAR; INTRAVENOUS at 02:07

## 2018-06-06 RX ADMIN — GUAIFENESIN AND DEXTROMETHORPHAN HYDROBROMIDE PRN ML: 100; 10 SOLUTION ORAL at 22:00

## 2018-06-06 RX ADMIN — INSULIN ASPART SCH UNIT: 100 INJECTION, SOLUTION INTRAVENOUS; SUBCUTANEOUS at 06:36

## 2018-06-06 RX ADMIN — HEPARIN SODIUM SCH UNIT: 5000 INJECTION, SOLUTION INTRAVENOUS; SUBCUTANEOUS at 21:58

## 2018-06-06 RX ADMIN — MEMANTINE SCH MG: 10 TABLET ORAL at 09:47

## 2018-06-06 RX ADMIN — IPRATROPIUM BROMIDE AND ALBUTEROL SULFATE SCH AMP: .5; 3 SOLUTION RESPIRATORY (INHALATION) at 08:20

## 2018-06-06 RX ADMIN — IPRATROPIUM BROMIDE AND ALBUTEROL SULFATE SCH AMP: .5; 3 SOLUTION RESPIRATORY (INHALATION) at 11:37

## 2018-06-06 RX ADMIN — INSULIN ASPART SCH UNIT: 100 INJECTION, SOLUTION INTRAVENOUS; SUBCUTANEOUS at 21:59

## 2018-06-06 RX ADMIN — EZETIMIBE SCH MG: 10 TABLET ORAL at 08:48

## 2018-06-06 RX ADMIN — POLYETHYLENE GLYCOL 3350 SCH GM: 17 POWDER, FOR SOLUTION ORAL at 09:48

## 2018-06-06 RX ADMIN — METHYLPREDNISOLONE SODIUM SUCCINATE SCH MG: 40 INJECTION, POWDER, FOR SOLUTION INTRAMUSCULAR; INTRAVENOUS at 09:49

## 2018-06-06 RX ADMIN — PANTOPRAZOLE SODIUM SCH MG: 40 TABLET, DELAYED RELEASE ORAL at 09:47

## 2018-06-06 RX ADMIN — METHYLPREDNISOLONE SODIUM SUCCINATE SCH MG: 40 INJECTION, POWDER, FOR SOLUTION INTRAMUSCULAR; INTRAVENOUS at 17:28

## 2018-06-06 RX ADMIN — INSULIN DETEMIR SCH UNITS: 100 INJECTION, SOLUTION SUBCUTANEOUS at 21:59

## 2018-06-06 RX ADMIN — INSULIN ASPART SCH UNIT: 100 INJECTION, SOLUTION INTRAVENOUS; SUBCUTANEOUS at 17:30

## 2018-06-06 RX ADMIN — RANOLAZINE SCH MG: 500 TABLET, FILM COATED, EXTENDED RELEASE ORAL at 09:46

## 2018-06-06 RX ADMIN — VALSARTAN SCH MG: 160 TABLET, FILM COATED ORAL at 09:47

## 2018-06-06 RX ADMIN — HEPARIN SODIUM SCH UNIT: 5000 INJECTION, SOLUTION INTRAVENOUS; SUBCUTANEOUS at 09:48

## 2018-06-06 RX ADMIN — IPRATROPIUM BROMIDE AND ALBUTEROL SULFATE SCH AMP: .5; 3 SOLUTION RESPIRATORY (INHALATION) at 16:35

## 2018-06-06 RX ADMIN — INSULIN ASPART SCH UNIT: 100 INJECTION, SOLUTION INTRAVENOUS; SUBCUTANEOUS at 11:42

## 2018-06-06 RX ADMIN — ASPIRIN 81 MG SCH MG: 81 TABLET ORAL at 09:46

## 2018-06-06 RX ADMIN — GLIMEPIRIDE SCH MG: 2 TABLET ORAL at 06:36

## 2018-06-06 NOTE — PN
Progress Note, Physician


History of Present Illness: 





PULMONARY








ALERT,LESS DYSPNEIC,LESS COUGH





- Current Medication List


Current Medications: 


Active Medications





Albuterol Sulfate (Ventolin 0.083% Nebulizer Soln -)  1 amp NEB Q4H PRN


   PRN Reason: SHORT OF BREATH/WHEEZING


Albuterol/Ipratropium (Duoneb -)  1 amp [facility]B [facility]


   Last Admin: 06/06/18 11:37 Dose:  1 amp


Aspirin (Asa -)  81 mg PO DAILY [facility]


   Last Admin: 06/06/18 09:46 Dose:  81 mg


Docusate Sodium (Colace -)  300 mg PO [facility]


   Last Admin: 06/05/18 22:12 Dose:  300 mg


Ezetimibe (Zetia -)  10 mg PO DAILY@0800 [facility]


   Last Admin: 06/06/18 08:48 Dose:  10 mg


Glimepiride (Amaryl -)  2 mg PO DAILY@0700 [facility]


   Last Admin: 06/06/18 06:36 Dose:  2 mg


Heparin Sodium (Porcine) (Heparin -)  5,000 unit SQ BID [facility]


   Last Admin: 06/06/18 09:48 Dose:  5,000 unit


Azithromycin 500 mg/ Dextrose  250 mls @ 250 mls/hr IVPB DAILY@0800 [facility]


   Last Admin: 06/06/18 08:27 Dose:  250 mls/hr


Insulin Aspart (Novolog Vial Sliding Scale -)  1 vial SQ [facility]; [facility]


   Last Admin: 06/06/18 11:42 Dose:  6 unit


Insulin Detemir (Levemir Vial)  20 units SQ [facility]


   Last Admin: 06/05/18 23:24 Dose:  20 units


Memantine (Namenda -)  10 mg PO DAILY@0800 [facility]


   Last Admin: 06/06/18 09:47 Dose:  10 mg


Methimazole (Tapazole -)  10 mg PO DAILY [facility]


   Last Admin: 06/06/18 09:49 Dose:  10 mg


Methylprednisolone Sodium Succinate (Solu-Medrol -)  40 mg IVPUSH Q8H-IV [facility]


   Last Admin: 06/06/18 09:49 Dose:  40 mg


Pantoprazole Sodium (Protonix -)  40 mg PO DAILY@0800 [facility]


   Last Admin: 06/06/18 09:47 Dose:  40 mg


Polyethylene Glycol (Miralax (For Daily Use) -)  17 gm PO DAILY [facility]


   Last Admin: 06/06/18 09:48 Dose:  17 gm


Ranolazine (Ranexa -)  1,000 mg PO BID [facility]


   Last Admin: 06/06/18 09:46 Dose:  1,000 mg


Valsartan (Diovan -)  160 mg PO DAILY@0800 [facility]


   Last Admin: 06/06/18 09:47 Dose:  160 mg











- Objective


Vital Signs: 


 Vital Signs











Temperature  98.0 F   06/06/18 10:00


 


Pulse Rate  71   06/06/18 10:00


 


Respiratory Rate  18 06/06/18 10:00


 


Blood Pressure  136/70   06/06/18 10:00


 


O2 Sat by Pulse Oximetry (%)  90 L  06/06/18 09:00











Constitutional: Yes: Calm, Obese


Eyes: Yes: WNL


HENT: Yes: WNL


Neck: Yes: WNL


Cardiovascular: Yes: Regular Rate and Rhythm, S1, S2


Respiratory: Yes: Wheezes (SCATTERED RAJ WHEEZES)


Gastrointestinal: Yes: Normal Bowel Sounds, Soft


Extremities: Yes: WNL


Edema: No


Labs: 


 CBC, BMP





 06/06/18 06:30 





 06/06/18 12:10 











Assessment/Plan





Problem List





- Problems


(1) Acute asthma exacerbation


Code(s): J45.901 - UNSPECIFIED ASTHMA WITH (ACUTE) EXACERBATION   





(2) Diabetes


Code(s): E11.9 - TYPE 2 DIABETES MELLITUS WITHOUT COMPLICATIONS   


Qualifiers: 


   Diabetes mellitus type: type 2 





(3) HLD (hyperlipidemia)


Code(s): E78.5 - HYPERLIPIDEMIA, UNSPECIFIED   





(4) Hyperthyroidism


Code(s): E05.90 - THYROTOXICOSIS, UNSP WITHOUT THYROTOXIC CRISIS OR STORM   





Assessment/Plan





Acute Asthma Exacerbation improving


Hyperthyroidism


DM


Hyperlipidemia





-IV medrol same dose


-  inhaled bronchdilators standing and PRN


-  O2 to keep SpO2 >90%


-  glucose control while on systemic steroids


-  DVT prophylaxis





DR RENTERIA

## 2018-06-06 NOTE — PN
Progress Note, Physician


Chief Complaint: 





AWAKE ALERT


NAD








- Current Medication List


Current Medications: 


Active Medications





Albuterol Sulfate (Ventolin 0.083% Nebulizer Soln -)  1 amp NEB Q4H PRN


   PRN Reason: SHORT OF BREATH/WHEEZING


Albuterol/Ipratropium (Duoneb -)  1 amp NEB RQID [facility]


   Last Admin: 06/06/18 08:20 Dose:  1 amp


Aspirin (Asa -)  81 mg PO DAILY [facility]


   Last Admin: 06/06/18 09:46 Dose:  81 mg


Docusate Sodium (Colace -)  300 mg PO [facility]


   Last Admin: 06/05/18 22:12 Dose:  300 mg


Ezetimibe (Zetia -)  10 mg PO DAILY@0800 [facility]


   Last Admin: 06/06/18 08:48 Dose:  10 mg


Glimepiride (Amaryl -)  2 mg PO DAILY@0700 [facility]


   Last Admin: 06/06/18 06:36 Dose:  2 mg


Heparin Sodium (Porcine) (Heparin -)  5,000 unit SQ BID [facility]


   Last Admin: 06/06/18 09:48 Dose:  5,000 unit


Azithromycin 500 mg/ Dextrose  250 mls @ 250 mls/hr IVPB DAILY@0800 [facility]


   Last Admin: 06/06/18 08:27 Dose:  250 mls/hr


Insulin Aspart (Novolog Vial Sliding Scale -)  1 vial SQ [facility]; Protocol


   Last Admin: 06/06/18 06:36 Dose:  6 unit


Insulin Detemir (Levemir Vial)  20 units SQ [facility]


   Last Admin: 06/05/18 23:24 Dose:  20 units


Memantine (Namenda -)  10 mg PO DAILY@0800 [facility]


   Last Admin: 06/06/18 09:47 Dose:  10 mg


Methimazole (Tapazole -)  10 mg PO DAILY [facility]


   Last Admin: 06/06/18 09:49 Dose:  10 mg


Methylprednisolone Sodium Succinate (Solu-Medrol -)  40 mg IVPUSH Q8H-IV [facility]


   Last Admin: 06/06/18 09:49 Dose:  40 mg


Pantoprazole Sodium (Protonix -)  40 mg PO DAILY@0800 [facility]


   Last Admin: 06/06/18 09:47 Dose:  40 mg


Polyethylene Glycol (Miralax (For Daily Use) -)  17 gm PO DAILY [facility]


   Last Admin: 06/06/18 09:48 Dose:  17 gm


Ranolazine (Ranexa -)  1,000 mg PO BID [facility]


   Last Admin: 06/06/18 09:46 Dose:  1,000 mg


Valsartan (Diovan -)  160 mg PO DAILY@0800 [facility]


   Last Admin: 06/06/18 09:47 Dose:  160 mg











- Objective


Vital Signs: 


 Vital Signs











Temperature  98.5 F   06/06/18 06:00


 


Pulse Rate  68   06/06/18 06:00


 


Respiratory Rate  20   06/06/18 06:00


 


Blood Pressure  142/72   06/06/18 06:00


 


O2 Sat by Pulse Oximetry (%)  95   06/05/18 21:00











Constitutional: Yes: Mild Distress


Eyes: Yes: WNL


HENT: Yes: WNL


Neck: Yes: WNL


Cardiovascular: Yes: WNL


Respiratory: Yes: On Nasal O2, SOB


Gastrointestinal: Yes: WNL


Genitourinary: Yes: WNL


Musculoskeletal: Yes: Muscle Weakness


Extremities: Yes: WNL


Edema: No


Peripheral Pulses WNL: Yes


Integumentary: Yes: WNL


Wound/Incision: Yes: Clean/Dry


Neurological: Yes: WNL


...Motor Strength: WNL


Psychiatric: Yes: WNL


Labs: 


 CBC, BMP





 06/06/18 06:30 





 06/06/18 06:30 











Problem List





- Problems


(1) Acute asthma exacerbation


Code(s): J45.901 - UNSPECIFIED ASTHMA WITH (ACUTE) EXACERBATION   





(2) Constipation


Code(s): K59.00 - CONSTIPATION, UNSPECIFIED   





(3) Diabetes


Code(s): E11.9 - TYPE 2 DIABETES MELLITUS WITHOUT COMPLICATIONS   


Qualifiers: 


   Diabetes mellitus type: type 2 





(4) HLD (hyperlipidemia)


Code(s): E78.5 - HYPERLIPIDEMIA, UNSPECIFIED   





(5) Hyperthyroidism


Code(s): E05.90 - THYROTOXICOSIS, UNSP WITHOUT THYROTOXIC CRISIS OR STORM   





(6) Shortness of breath


Code(s): R06.02 - SHORTNESS OF BREATH   





Assessment/Plan





IV STEROIDS


BGM CHECKS


IV ABX


NEBS


02 SUPPORT


DVT PROPHYLAXIS


PULMONARY EVAL

## 2018-06-07 LAB
ANION GAP SERPL CALC-SCNC: 5 MMOL/L (ref 8–16)
BUN SERPL-MCNC: 24 MG/DL (ref 7–18)
CALCIUM SERPL-MCNC: 9.5 MG/DL (ref 8.5–10.1)
CHLORIDE SERPL-SCNC: 98 MMOL/L (ref 98–107)
CO2 SERPL-SCNC: 34 MMOL/L (ref 21–32)
CREAT SERPL-MCNC: 0.7 MG/DL (ref 0.55–1.02)
GLUCOSE SERPL-MCNC: 219 MG/DL (ref 74–106)
POTASSIUM SERPLBLD-SCNC: 4.7 MMOL/L (ref 3.5–5.1)
SODIUM SERPL-SCNC: 137 MMOL/L (ref 136–145)

## 2018-06-07 RX ADMIN — VALSARTAN SCH MG: 160 TABLET, FILM COATED ORAL at 08:52

## 2018-06-07 RX ADMIN — METHYLPREDNISOLONE SODIUM SUCCINATE SCH MG: 40 INJECTION, POWDER, FOR SOLUTION INTRAMUSCULAR; INTRAVENOUS at 21:47

## 2018-06-07 RX ADMIN — INSULIN ASPART SCH UNIT: 100 INJECTION, SOLUTION INTRAVENOUS; SUBCUTANEOUS at 06:40

## 2018-06-07 RX ADMIN — HEPARIN SODIUM SCH UNIT: 5000 INJECTION, SOLUTION INTRAVENOUS; SUBCUTANEOUS at 10:36

## 2018-06-07 RX ADMIN — IPRATROPIUM BROMIDE AND ALBUTEROL SULFATE SCH AMP: .5; 3 SOLUTION RESPIRATORY (INHALATION) at 08:21

## 2018-06-07 RX ADMIN — INSULIN ASPART SCH UNIT: 100 INJECTION, SOLUTION INTRAVENOUS; SUBCUTANEOUS at 21:48

## 2018-06-07 RX ADMIN — GUAIFENESIN AND DEXTROMETHORPHAN HYDROBROMIDE PRN ML: 100; 10 SOLUTION ORAL at 10:37

## 2018-06-07 RX ADMIN — POLYETHYLENE GLYCOL 3350 SCH GM: 17 POWDER, FOR SOLUTION ORAL at 10:36

## 2018-06-07 RX ADMIN — INSULIN ASPART SCH UNIT: 100 INJECTION, SOLUTION INTRAVENOUS; SUBCUTANEOUS at 11:43

## 2018-06-07 RX ADMIN — MEMANTINE SCH MG: 10 TABLET ORAL at 08:52

## 2018-06-07 RX ADMIN — HEPARIN SODIUM SCH UNIT: 5000 INJECTION, SOLUTION INTRAVENOUS; SUBCUTANEOUS at 21:47

## 2018-06-07 RX ADMIN — INSULIN ASPART SCH UNIT: 100 INJECTION, SOLUTION INTRAVENOUS; SUBCUTANEOUS at 17:09

## 2018-06-07 RX ADMIN — DOCUSATE SODIUM SCH MG: 100 CAPSULE, LIQUID FILLED ORAL at 21:49

## 2018-06-07 RX ADMIN — EZETIMIBE SCH MG: 10 TABLET ORAL at 08:52

## 2018-06-07 RX ADMIN — INSULIN DETEMIR SCH UNITS: 100 INJECTION, SOLUTION SUBCUTANEOUS at 06:40

## 2018-06-07 RX ADMIN — IPRATROPIUM BROMIDE AND ALBUTEROL SULFATE SCH AMP: .5; 3 SOLUTION RESPIRATORY (INHALATION) at 11:20

## 2018-06-07 RX ADMIN — INSULIN DETEMIR SCH UNITS: 100 INJECTION, SOLUTION SUBCUTANEOUS at 21:48

## 2018-06-07 RX ADMIN — PANTOPRAZOLE SODIUM SCH MG: 40 TABLET, DELAYED RELEASE ORAL at 08:52

## 2018-06-07 RX ADMIN — IPRATROPIUM BROMIDE AND ALBUTEROL SULFATE SCH AMP: .5; 3 SOLUTION RESPIRATORY (INHALATION) at 16:04

## 2018-06-07 RX ADMIN — METHYLPREDNISOLONE SODIUM SUCCINATE SCH MG: 40 INJECTION, POWDER, FOR SOLUTION INTRAMUSCULAR; INTRAVENOUS at 10:36

## 2018-06-07 RX ADMIN — ASPIRIN 81 MG SCH MG: 81 TABLET ORAL at 10:37

## 2018-06-07 RX ADMIN — RANOLAZINE SCH MG: 500 TABLET, FILM COATED, EXTENDED RELEASE ORAL at 21:49

## 2018-06-07 RX ADMIN — RANOLAZINE SCH MG: 500 TABLET, FILM COATED, EXTENDED RELEASE ORAL at 10:37

## 2018-06-07 RX ADMIN — METHIMAZOLE SCH MG: 10 TABLET ORAL at 10:37

## 2018-06-07 RX ADMIN — IPRATROPIUM BROMIDE AND ALBUTEROL SULFATE SCH AMP: .5; 3 SOLUTION RESPIRATORY (INHALATION) at 20:30

## 2018-06-07 RX ADMIN — METHIMAZOLE SCH MG: 10 TABLET ORAL at 22:22

## 2018-06-07 RX ADMIN — METHYLPREDNISOLONE SODIUM SUCCINATE SCH MG: 40 INJECTION, POWDER, FOR SOLUTION INTRAMUSCULAR; INTRAVENOUS at 01:32

## 2018-06-07 RX ADMIN — AZITHROMYCIN DIHYDRATE SCH MLS/HR: 500 INJECTION, POWDER, LYOPHILIZED, FOR SOLUTION INTRAVENOUS at 08:53

## 2018-06-07 NOTE — CONSULT
Consult


Consult Specialty:: endocrine


Referred by:: dr.saba abdullahi


Reason for Consultation:: hyperthyroidism/dm





- History of Present Illness


Chief Complaint: difficulty breathing


History of Present Illness: 





83 yo female w/ pmh of HTN, HLD, COPD, DM, asthma, hyperthroidism who presents 

complaining of 2 week history of shortness of breath she describes "feels like 

she is drowning." She endorses additional yellow sputum and nasal congestion. 

Ms. Balderas reports she had anxiety and tremors,blood sugars higher than normal

,weakness in legs,and arms,no hypoglycemia,nausea or vomiting





- History Source


History Provided By: Patient





- Past Medical History


Cardio/Vascular: Yes: HTN, Hyperlipdemia


Pulmonary: Yes: COPD


Endocrine: Yes: Diabetes Mellitus, Hyperthyroidism





- Alcohol/Substance Use


Hx Alcohol Use: No





- Smoking History


Smoking history: Never smoked


Have you smoked in the past 12 months: No


Aproximately how many cigarettes per day: 0





Home Medications





- Allergies


Allergies/Adverse Reactions: 


 Allergies











Allergy/AdvReac Type Severity Reaction Status Date / Time


 


No Known Allergies Allergy   Verified 06/04/18 11:00














- Home Medications


Home Medications: 


Ambulatory Orders





Aspirin [ASA -] 81 mg PO DAILY 02/23/18 


Glimepiride [Amaryl -] 2 mg PO DAILY 02/23/18 


Methimazole [Tapazole -] 10 mg PO DAILY 02/23/18 


Ranolazine [Ranexa] 1,000 mg PO BID 02/23/18 


Exenatide Microspheres [Bydureon Bcise] 2 mg SQ WEEKLY 06/05/18 


Insulin Degludec [Tresiba Flextouch U-100] 40 unit SQ DAILY 06/05/18 











Review of Systems





- Review of Systems


Constitutional: reports: Loss of Appetite, Unintentional Wgt. Loss, Weakness


Eyes: reports: Blurred Vision


HENT: reports: No Symptoms


Neck: reports: No Symptoms


Cardiovascular: reports: Palpitations, Shortness of Breath


Respiratory: reports: Exercise Intolerance, SOB on Exertion


Gastrointestinal: reports: Bloating


Genitourinary: reports: No Symptoms


Musculoskeletal: reports: Muscle Weakness


Integumentary: reports: No Symptoms


Neurological: reports: Tremors, Weakness


Endocrine: reports: Increased Thirst, Unexplained Weight Loss





Physical Exam


Vital Signs: 


 Vital Signs











Temperature  97.8 F   06/06/18 20:00


 


Pulse Rate  65   06/06/18 20:00


 


Respiratory Rate  19   06/06/18 21:00


 


Blood Pressure  134/73   06/06/18 20:00


 


O2 Sat by Pulse Oximetry (%)  96   06/06/18 21:00











Constitutional: Yes: Anxious


Eyes: Yes: EOM Intact


HENT: Yes: Normocephalic


Neck: Yes: Trachea Midline, Thyromegaly


Cardiovascular: Yes: Tachycardia


Respiratory: Yes: Cough, Rhonchi, SOB


Gastrointestinal: Yes: Abdomen, Obese


...Rectal Exam: Yes: Deferred


Renal/: Yes: WNL


Musculoskeletal: Yes: WNL, Muscle Pain, Muscle Weakness


Extremities: Yes: WNL


Neurological: Yes: Alert, Oriented, Tremors, Weakness


Labs: 


 CBC, BMP





 06/06/18 06:30 





 06/06/18 12:10 











Problem List





- Problems


(1) Acute asthma exacerbation


Code(s): J45.901 - UNSPECIFIED ASTHMA WITH (ACUTE) EXACERBATION   





(2) Constipation


Code(s): K59.00 - CONSTIPATION, UNSPECIFIED   





(3) Diabetes


Code(s): E11.9 - TYPE 2 DIABETES MELLITUS WITHOUT COMPLICATIONS   


Qualifiers: 


   Diabetes mellitus type: type 2 





(4) Hyperthyroidism


Code(s): E05.90 - THYROTOXICOSIS, UNSP WITHOUT THYROTOXIC CRISIS OR STORM   





(5) Leg edema


Code(s): R60.0 - LOCALIZED EDEMA   





(6) Shortness of breath


Code(s): R06.02 - SHORTNESS OF BREATH   





(7) Epigastric pain


Code(s): R10.13 - EPIGASTRIC PAIN   





Assessment/Plan





Current Active Problems





Acute asthma exacerbation (Acute)


Constipation (Acute)


Diabetes (Acute)


HLD (hyperlipidemia) (Acute)


Hyperthyroidism (Acute)


Leg edema (Acute)


Shortness of breath (Acute)





 Abnormal Lab Results











  06/05/18 06/06/18 06/06/18





  07:30 06:30 06:30


 


WBC   10.1 H D 


 


RDW   15.7 H 


 


Neutrophils %   94.0 H 


 


Lymphocytes %   4.7 L D 


 


Monocytes %   1.3 L D 


 


Potassium    5.2 H


 


Carbon Dioxide    34 H


 


Anion Gap    5 L


 


BUN    28 H


 


Random Glucose    285 H


 


Hemoglobin A1c %  7.9 H  


 


AST    50 H


 


Alkaline Phosphatase    180 H


 


Albumin    3.0 L














  06/06/18





  12:10


 


WBC 


 


RDW 


 


Neutrophils % 


 


Lymphocytes % 


 


Monocytes % 


 


Potassium 


 


Carbon Dioxide  33 H


 


Anion Gap  5 L


 


BUN  26 H


 


Random Glucose  322 H*


 


Hemoglobin A1c % 


 


AST 


 


Alkaline Phosphatase 


 


Albumin 








 Laboratory Results - last 24 hr











  06/05/18 06/06/18 06/06/18





  07:30 06:01 06:30


 


WBC    10.1 H D


 


RBC    3.98


 


Hgb    11.0


 


Hct    34.1


 


MCV    85.6


 


MCH    27.7


 


MCHC    32.4


 


RDW    15.7 H


 


Plt Count    229


 


MPV    7.9


 


Absolute Neuts (auto)    9.5


 


Neutrophils %    94.0 H


 


Lymphocytes %    4.7 L D


 


Monocytes %    1.3 L D


 


Eosinophils %    0.0


 


Basophils %    0.0


 


Nucleated RBC %    0


 


Sodium   


 


Potassium   


 


Chloride   


 


Carbon Dioxide   


 


Anion Gap   


 


BUN   


 


Creatinine   


 


Creat Clearance w eGFR   


 


POC Glucometer   306 


 


Random Glucose   


 


Hemoglobin A1c %  7.9 H  


 


Calcium   


 


Total Bilirubin   


 


AST   


 


ALT   


 


Alkaline Phosphatase   


 


Total Protein   


 


Albumin   














  06/06/18 06/06/18 06/06/18





  06:30 11:32 12:10


 


WBC   


 


RBC   


 


Hgb   


 


Hct   


 


MCV   


 


MCH   


 


MCHC   


 


RDW   


 


Plt Count   


 


MPV   


 


Absolute Neuts (auto)   


 


Neutrophils %   


 


Lymphocytes %   


 


Monocytes %   


 


Eosinophils %   


 


Basophils %   


 


Nucleated RBC %   


 


Sodium  138   136


 


Potassium  5.2 H   4.9


 


Chloride  99   98


 


Carbon Dioxide  34 H   33 H


 


Anion Gap  5 L   5 L


 


BUN  28 H   26 H


 


Creatinine  0.9   0.8


 


Creat Clearance w eGFR  59.94  


 


POC Glucometer   325 


 


Random Glucose  285 H   322 H*


 


Hemoglobin A1c %   


 


Calcium  9.2   9.2


 


Total Bilirubin  0.5  


 


AST  50 H  


 


ALT  57  


 


Alkaline Phosphatase  180 H  


 


Total Protein  6.6  


 


Albumin  3.0 L  














  06/06/18 06/06/18





  17:27 21:52


 


WBC  


 


RBC  


 


Hgb  


 


Hct  


 


MCV  


 


MCH  


 


MCHC  


 


RDW  


 


Plt Count  


 


MPV  


 


Absolute Neuts (auto)  


 


Neutrophils %  


 


Lymphocytes %  


 


Monocytes %  


 


Eosinophils %  


 


Basophils %  


 


Nucleated RBC %  


 


Sodium  


 


Potassium  


 


Chloride  


 


Carbon Dioxide  


 


Anion Gap  


 


BUN  


 


Creatinine  


 


Creat Clearance w eGFR  


 


POC Glucometer  237  352


 


Random Glucose  


 


Hemoglobin A1c %  


 


Calcium  


 


Total Bilirubin  


 


AST  


 


ALT  


 


Alkaline Phosphatase  


 


Total Protein  


 


Albumin  








plan:


bgm qid novolog insulin doses


levemir 25 units am


levemir 20 units hs


hyperthyroidism /continue methimazole 10mg bid


aim to normalize tsh

## 2018-06-07 NOTE — PN
Progress Note, Physician


Chief Complaint: 





patient sitting up in chair


coughing is better








- Current Medication List


Current Medications: 


Active Medications





Albuterol Sulfate (Ventolin 0.083% Nebulizer Soln -)  1 amp NEB Q4H PRN


   PRN Reason: SHORT OF BREATH/WHEEZING


Albuterol/Ipratropium (Duoneb -)  1 amp NEB RQID [facility]


   Last Admin: 06/07/18 08:21 Dose:  1 amp


Aspirin (Asa -)  81 mg PO DAILY [facility]


   Last Admin: 06/07/18 10:37 Dose:  81 mg


Docusate Sodium (Colace -)  300 mg PO HS [facility]


   Last Admin: 06/06/18 21:59 Dose:  300 mg


Ezetimibe (Zetia -)  10 mg PO DAILY@0800 [facility]


   Last Admin: 06/07/18 08:52 Dose:  10 mg


Guaifenesin (Diabetic Tussin Dm -)  5 ml PO Q6H PRN


   PRN Reason: COUGH


   Last Admin: 06/07/18 10:37 Dose:  5 ml


Heparin Sodium (Porcine) (Heparin -)  5,000 unit SQ BID [facility]


   Last Admin: 06/07/18 10:36 Dose:  5,000 unit


Azithromycin 500 mg/ Dextrose  250 mls @ 250 mls/hr IVPB DAILY@0800 [facility]


   Last Admin: 06/07/18 08:53 Dose:  250 mls/hr


Insulin Aspart (Novolog Vial Sliding Scale -)  1 vial SQ [facility]; Protocol


   Last Admin: 06/07/18 06:40 Dose:  2 unit


Insulin Detemir (Levemir Vial)  20 units SQ HS [facility]


   Last Admin: 06/06/18 21:59 Dose:  20 units


Insulin Detemir (Levemir Vial)  25 units SQ AM [facility]


   Last Admin: 06/07/18 06:40 Dose:  25 units


Memantine (Namenda -)  10 mg PO DAILY@0800 [facility]


   Last Admin: 06/07/18 08:52 Dose:  10 mg


Methimazole (Tapazole -)  10 mg PO BID [facility]


   Last Admin: 06/07/18 10:37 Dose:  10 mg


Methylprednisolone Sodium Succinate (Solu-Medrol -)  40 mg IVPUSH Q8H-IV [facility]


   Last Admin: 06/07/18 10:36 Dose:  40 mg


Pantoprazole Sodium (Protonix -)  40 mg PO DAILY@0800 [facility]


   Last Admin: 06/07/18 08:52 Dose:  40 mg


Polyethylene Glycol (Miralax (For Daily Use) -)  17 gm PO DAILY [facility]


   Last Admin: 06/07/18 10:36 Dose:  17 gm


Ranolazine (Ranexa -)  1,000 mg PO BID [facility]


   Last Admin: 06/07/18 10:37 Dose:  1,000 mg


Valsartan (Diovan -)  160 mg PO DAILY@0800 [facility]


   Last Admin: 06/07/18 08:52 Dose:  160 mg











- Objective


Vital Signs: 


 Vital Signs











Temperature  97.5 F L  06/07/18 06:00


 


Pulse Rate  64   06/07/18 06:00


 


Respiratory Rate  19   06/07/18 06:00


 


Blood Pressure  143/67   06/07/18 06:00


 


O2 Sat by Pulse Oximetry (%)  96   06/06/18 21:00











Constitutional: Yes: Calm


Cardiovascular: Yes: Regular Rate and Rhythm, S1, S2


Respiratory: Yes: Rhonchi


Gastrointestinal: Yes: Normal Bowel Sounds, Soft


Edema: Yes (slight)


Neurological: Yes: Alert, Oriented


Labs: 


 CBC, BMP





 06/06/18 06:30 





 06/07/18 06:00 











Problem List





- Problems


(1) Shortness of breath


Assessment/Plan: 


asthma exacerbation- improving


iv steroids will change to BID steroids


abx


duonebs


oxygen as needed


pulm on board


Code(s): R06.02 - SHORTNESS OF BREATH   





(2) Diabetes


Assessment/Plan: 


bgm


sliding scale


amaryl and levemir dose adjusted per endocrine


Code(s): E11.9 - TYPE 2 DIABETES MELLITUS WITHOUT COMPLICATIONS   


Qualifiers: 


   Diabetes mellitus type: type 2 





(3) HLD (hyperlipidemia)


Assessment/Plan: 


zetia


check lipid panel noted HDL is low


Code(s): E78.5 - HYPERLIPIDEMIA, UNSPECIFIED   





(4) Hyperthyroidism


Assessment/Plan: 


tapazole 10mg bid


check tsh and free t4- tsh noted


Code(s): E05.90 - THYROTOXICOSIS, UNSP WITHOUT THYROTOXIC CRISIS OR STORM   





(5) Constipation


Assessment/Plan: 


miralax


colace


Code(s): K59.00 - CONSTIPATION, UNSPECIFIED   





(6) Leg edema


Assessment/Plan: 


iv lasix one dose, edema improved


venous doppler no dvt


Code(s): R60.0 - LOCALIZED EDEMA

## 2018-06-07 NOTE — PN
Progress Note (short form)





- Note


Progress Note: 





PULMONARY





States breathing better. Less cough and wheezing.





 Last Vital Signs











Temp Pulse Resp BP Pulse Ox


 


 98.1 F   75   18   140/60   91 L


 


 06/07/18 10:35  06/07/18 10:35  06/07/18 10:35  06/07/18 10:35  06/07/18 08:52








Gen: NAD at rest


Heart: RRR


Lung: bilateral scattered wheezes


Abd: soft, nontender


Ext: no edema





 CBC, BMP





 06/06/18 06:30 





 06/07/18 06:00 





Active Medications





Albuterol Sulfate (Ventolin 0.083% Nebulizer Soln -)  1 amp NEB Q4H PRN


   PRN Reason: SHORT OF BREATH/WHEEZING


Albuterol/Ipratropium (Duoneb -)  1 amp NEB RQID [facility]


   Last Admin: 06/07/18 08:21 Dose:  1 amp


Aspirin (Asa -)  81 mg PO DAILY [facility]


   Last Admin: 06/07/18 10:37 Dose:  81 mg


Docusate Sodium (Colace -)  300 mg PO HS [facility]


   Last Admin: 06/06/18 21:59 Dose:  300 mg


Ezetimibe (Zetia -)  10 mg PO DAILY@0800 [facility]


   Last Admin: 06/07/18 08:52 Dose:  10 mg


Guaifenesin (Diabetic Tussin Dm -)  5 ml PO Q6H PRN


   PRN Reason: COUGH


   Last Admin: 06/07/18 10:37 Dose:  5 ml


Heparin Sodium (Porcine) (Heparin -)  5,000 unit SQ BID [facility]


   Last Admin: 06/07/18 10:36 Dose:  5,000 unit


Azithromycin 500 mg/ Dextrose  250 mls @ 250 mls/hr IVPB DAILY@0800 [facility]


   Last Admin: 06/07/18 08:53 Dose:  250 mls/hr


Insulin Aspart (Novolog Vial Sliding Scale -)  1 vial SQ [facility]; Protocol


   Last Admin: 06/07/18 06:40 Dose:  2 unit


Insulin Detemir (Levemir Vial)  20 units SQ HS [facility]


   Last Admin: 06/06/18 21:59 Dose:  20 units


Insulin Detemir (Levemir Vial)  25 units SQ AM [facility]


   Last Admin: 06/07/18 06:40 Dose:  25 units


Memantine (Namenda -)  10 mg PO DAILY@0800 [facility]


   Last Admin: 06/07/18 08:52 Dose:  10 mg


Methimazole (Tapazole -)  10 mg PO BID [facility]


   Last Admin: 06/07/18 10:37 Dose:  10 mg


Methylprednisolone Sodium Succinate (Solu-Medrol -)  40 mg IVPUSH BID [facility]


Pantoprazole Sodium (Protonix -)  40 mg PO DAILY@0800 [facility]


   Last Admin: 06/07/18 08:52 Dose:  40 mg


Polyethylene Glycol (Miralax (For Daily Use) -)  17 gm PO DAILY [facility]


   Last Admin: 06/07/18 10:36 Dose:  17 gm


Ranolazine (Ranexa -)  1,000 mg PO BID [facility]


   Last Admin: 06/07/18 10:37 Dose:  1,000 mg


Valsartan (Diovan -)  160 mg PO DAILY@0800 [facility]


   Last Admin: 06/07/18 08:52 Dose:  160 mg





A/P


Acute Asthma Exacerbation


Hyperthyroidism


DM


Hyperlipidemia





-  continue medrol


-  can likely changes steroids to PO prednisone 40mg daily in AM and taper as 

outpt


-  inhaled bronchdilators standing and PRN


-  O2 to keep SpO2 >90%


-  glucose control while on systemic steroids


-  DVT prophylaxis








Problem List





- Problems


(1) Acute asthma exacerbation


Code(s): J45.901 - UNSPECIFIED ASTHMA WITH (ACUTE) EXACERBATION   





(2) Diabetes


Code(s): E11.9 - TYPE 2 DIABETES MELLITUS WITHOUT COMPLICATIONS   


Qualifiers: 


   Diabetes mellitus type: type 2 





(3) HLD (hyperlipidemia)


Code(s): E78.5 - HYPERLIPIDEMIA, UNSPECIFIED   





(4) Hyperthyroidism


Code(s): E05.90 - THYROTOXICOSIS, UNSP WITHOUT THYROTOXIC CRISIS OR STORM

## 2018-06-08 VITALS — TEMPERATURE: 98.4 F | HEART RATE: 76 BPM | DIASTOLIC BLOOD PRESSURE: 87 MMHG | SYSTOLIC BLOOD PRESSURE: 154 MMHG

## 2018-06-08 LAB
ALBUMIN SERPL-MCNC: 3.3 G/DL (ref 3.4–5)
ALP SERPL-CCNC: 153 U/L (ref 45–117)
ALT SERPL-CCNC: 74 U/L (ref 12–78)
ANION GAP SERPL CALC-SCNC: 8 MMOL/L (ref 8–16)
AST SERPL-CCNC: 42 U/L (ref 15–37)
BILIRUB SERPL-MCNC: 0.8 MG/DL (ref 0.2–1)
BUN SERPL-MCNC: 22 MG/DL (ref 7–18)
CALCIUM SERPL-MCNC: 10.1 MG/DL (ref 8.5–10.1)
CHLORIDE SERPL-SCNC: 98 MMOL/L (ref 98–107)
CO2 SERPL-SCNC: 31 MMOL/L (ref 21–32)
CREAT SERPL-MCNC: 0.8 MG/DL (ref 0.55–1.02)
DEPRECATED RDW RBC AUTO: 15.8 % (ref 11.6–15.6)
GLUCOSE SERPL-MCNC: 125 MG/DL (ref 74–106)
HCT VFR BLD CALC: 35.6 % (ref 32.4–45.2)
HGB BLD-MCNC: 11.4 GM/DL (ref 10.7–15.3)
MCH RBC QN AUTO: 27.4 PG (ref 25.7–33.7)
MCHC RBC AUTO-ENTMCNC: 32.1 G/DL (ref 32–36)
MCV RBC: 85.2 FL (ref 80–96)
PLATELET # BLD AUTO: 256 K/MM3 (ref 134–434)
PMV BLD: 7.9 FL (ref 7.5–11.1)
POTASSIUM SERPLBLD-SCNC: 4.9 MMOL/L (ref 3.5–5.1)
PROT SERPL-MCNC: 7.1 G/DL (ref 6.4–8.2)
RBC # BLD AUTO: 4.18 M/MM3 (ref 3.6–5.2)
SODIUM SERPL-SCNC: 137 MMOL/L (ref 136–145)
WBC # BLD AUTO: 11.2 K/MM3 (ref 4–10)

## 2018-06-08 RX ADMIN — IPRATROPIUM BROMIDE AND ALBUTEROL SULFATE SCH AMP: .5; 3 SOLUTION RESPIRATORY (INHALATION) at 12:08

## 2018-06-08 RX ADMIN — EZETIMIBE SCH MG: 10 TABLET ORAL at 10:01

## 2018-06-08 RX ADMIN — INSULIN ASPART SCH UNIT: 100 INJECTION, SOLUTION INTRAVENOUS; SUBCUTANEOUS at 12:24

## 2018-06-08 RX ADMIN — RANOLAZINE SCH MG: 500 TABLET, FILM COATED, EXTENDED RELEASE ORAL at 09:59

## 2018-06-08 RX ADMIN — HEPARIN SODIUM SCH UNIT: 5000 INJECTION, SOLUTION INTRAVENOUS; SUBCUTANEOUS at 10:01

## 2018-06-08 RX ADMIN — AZITHROMYCIN DIHYDRATE SCH MLS/HR: 500 INJECTION, POWDER, LYOPHILIZED, FOR SOLUTION INTRAVENOUS at 09:54

## 2018-06-08 RX ADMIN — PANTOPRAZOLE SODIUM SCH MG: 40 TABLET, DELAYED RELEASE ORAL at 10:00

## 2018-06-08 RX ADMIN — INSULIN ASPART SCH UNIT: 100 INJECTION, SOLUTION INTRAVENOUS; SUBCUTANEOUS at 16:44

## 2018-06-08 RX ADMIN — IPRATROPIUM BROMIDE AND ALBUTEROL SULFATE SCH AMP: .5; 3 SOLUTION RESPIRATORY (INHALATION) at 07:30

## 2018-06-08 RX ADMIN — INSULIN ASPART SCH: 100 INJECTION, SOLUTION INTRAVENOUS; SUBCUTANEOUS at 06:19

## 2018-06-08 RX ADMIN — GUAIFENESIN AND DEXTROMETHORPHAN HYDROBROMIDE PRN ML: 100; 10 SOLUTION ORAL at 10:03

## 2018-06-08 RX ADMIN — ASPIRIN 81 MG SCH MG: 81 TABLET ORAL at 09:59

## 2018-06-08 RX ADMIN — METHYLPREDNISOLONE SODIUM SUCCINATE SCH MG: 40 INJECTION, POWDER, FOR SOLUTION INTRAMUSCULAR; INTRAVENOUS at 09:55

## 2018-06-08 RX ADMIN — VALSARTAN SCH MG: 160 TABLET, FILM COATED ORAL at 09:59

## 2018-06-08 RX ADMIN — POLYETHYLENE GLYCOL 3350 SCH GM: 17 POWDER, FOR SOLUTION ORAL at 10:03

## 2018-06-08 RX ADMIN — IPRATROPIUM BROMIDE AND ALBUTEROL SULFATE SCH AMP: .5; 3 SOLUTION RESPIRATORY (INHALATION) at 16:25

## 2018-06-08 RX ADMIN — MEMANTINE SCH MG: 10 TABLET ORAL at 09:59

## 2018-06-08 RX ADMIN — INSULIN DETEMIR SCH UNITS: 100 INJECTION, SOLUTION SUBCUTANEOUS at 06:19

## 2018-06-08 RX ADMIN — METHIMAZOLE SCH MG: 10 TABLET ORAL at 09:59

## 2018-06-08 NOTE — PN
Progress Note (short form)





- Note


Progress Note: 





PULMONARY








 Less cough and wheezing.








VSS/afebrile


Gen: NAD at rest


Heart: RRR


Lung: bilateral scattered wheezes minimal


Abd: soft, nontender


Ext: no edema








labs/meds/notes/images reviewed


A/P


Acute Asthma Exacerbation resolved


Hyperthyroidism


DM


Hyperlipidemia





-  prednisone 40mg daily and taper as outpt


-  inhaled bronchdilators standing and PRN


-  O2 to keep SpO2 >90%


-  glucose control while on systemic steroids


-  DVT prophylaxis


-  No objection to continuing treatment as an outpatient








MARIAMA BLACK MD

## 2018-06-08 NOTE — PN
Progress Note (short form)





- Note


Progress Note: 





askedb y dr wetzel to send prescrription for zithromax 500 mg x 2days as 

she cannot do it at this time

## 2018-06-08 NOTE — DS
Physical Examination


Vital Signs: 


 Vital Signs











Temperature  98.1 F   06/08/18 09:50


 


Pulse Rate  77   06/08/18 09:50


 


Respiratory Rate  18   06/08/18 09:50


 


Blood Pressure  120/60   06/08/18 09:50


 


O2 Sat by Pulse Oximetry (%)  94 L  06/07/18 21:00











Constitutional: Yes: Calm


Cardiovascular: Yes: Regular Rate and Rhythm, S1, S2


Respiratory: Yes: Rhonchi


Gastrointestinal: Yes: Normal Bowel Sounds, Soft


Neurological: Yes: Alert, Oriented


Labs: 


 CBC, BMP





 06/08/18 09:05 





 06/08/18 06:18 











Discharge Summary


Reason For Visit: SOB


Current Active Problems





Acute asthma exacerbation (Acute)


Constipation (Acute)


Diabetes (Acute)


HLD (hyperlipidemia) (Acute)


Hyperthyroidism (Acute)


Leg edema (Acute)


Shortness of breath (Acute)








Other Procedures: doppler of legs: no dvt


Hospital Course: 





- Primary Care Physician


PCP: Virgilio Neumann





- Admission


Chief Complaint: sob and coughing worsening for last 2 weeks


History of Present Illness: 





Ms. Balderas is an 81 yo female w/ pmh of HTN, HLD, COPD, DM, asthma, 

hypothyroidism who presents complaining of 2 week history of shortness of 

breath she describes "feels like she is drowning." She endorses additional 

yellow sputum and nasal congestion. Ms. Balderas reports she had gone to PCP 

friday, however represented today as she was still having symptoms


per family member she was seen by PCP a couple of times in last 2 weeks and 

after her last visit when family did not see improvement brought her to ER


they report worsening cough and shortness of breath





In the ER


patient got


zithromax iv


magnesium iv,


duonebs


prednisone 60mg





acute asthma exacerbationin \iv steroids now change to po


abx course


to go home with [facility]


seen by endocrine for hyperthyroid and DM insulin adjusted


Condition: Improved





- Instructions


Diet, Activity, Other Instructions: 


prednisone 40mg po daily for 2 days,


30mg po daily for 2 days 


20mg po daily for 2 days 


10mg po daily for 2 days and then stop


azithromycin 500mg po daily for another 2 days the n stop


Referrals: 


Virgilio Neumann MD [Primary Care Provider] - 1 Week


Disposition: [facility]/HOME HEALTH CARE





- Home Medications


Comprehensive Discharge Medication List: 


Ambulatory Orders





Aspirin [ASA -] 81 mg PO DAILY 02/23/18 


Glimepiride [Amaryl -] 2 mg PO DAILY 02/23/18 


Methimazole [Tapazole -] 10 mg PO DAILY 02/23/18 


Ranolazine [Ranexa] 1,000 mg PO BID 02/23/18 


Exenatide Microspheres [Bydureon Bcise] 2 mg SQ WEEKLY 06/05/18 


Insulin Degludec [Tresiba Flextouch U-100] 40 unit SQ DAILY 06/05/18

## 2018-11-14 ENCOUNTER — HOSPITAL ENCOUNTER (EMERGENCY)
Dept: HOSPITAL 74 - JER | Age: 83
Discharge: HOME | End: 2018-11-14
Payer: COMMERCIAL

## 2018-11-14 VITALS — BODY MASS INDEX: 37.3 KG/M2

## 2018-11-14 VITALS — DIASTOLIC BLOOD PRESSURE: 55 MMHG | SYSTOLIC BLOOD PRESSURE: 108 MMHG | HEART RATE: 84 BPM | TEMPERATURE: 98.6 F

## 2018-11-14 DIAGNOSIS — Z87.891: ICD-10-CM

## 2018-11-14 DIAGNOSIS — R10.9: Primary | ICD-10-CM

## 2018-11-14 DIAGNOSIS — I10: ICD-10-CM

## 2018-11-14 DIAGNOSIS — E78.00: ICD-10-CM

## 2018-11-14 DIAGNOSIS — E11.9: ICD-10-CM

## 2018-11-14 DIAGNOSIS — I50.9: ICD-10-CM

## 2018-11-14 DIAGNOSIS — J45.909: ICD-10-CM

## 2018-11-14 DIAGNOSIS — E03.9: ICD-10-CM

## 2018-11-14 LAB
ALBUMIN SERPL-MCNC: 3.4 G/DL (ref 3.4–5)
ALP SERPL-CCNC: 172 U/L (ref 45–117)
ALT SERPL-CCNC: 25 U/L (ref 13–61)
ANION GAP SERPL CALC-SCNC: 11 MMOL/L (ref 8–16)
APPEARANCE UR: CLEAR
AST SERPL-CCNC: 25 U/L (ref 15–37)
BASOPHILS # BLD: 0.5 % (ref 0–2)
BILIRUB SERPL-MCNC: 1.2 MG/DL (ref 0.2–1)
BILIRUB UR STRIP.AUTO-MCNC: NEGATIVE MG/DL
BUN SERPL-MCNC: 32 MG/DL (ref 7–18)
CALCIUM SERPL-MCNC: 9.6 MG/DL (ref 8.5–10.1)
CHLORIDE SERPL-SCNC: 99 MMOL/L (ref 98–107)
CO2 SERPL-SCNC: 30 MMOL/L (ref 21–32)
COLOR UR: YELLOW
CREAT SERPL-MCNC: 1.1 MG/DL (ref 0.55–1.3)
DEPRECATED RDW RBC AUTO: 15.7 % (ref 11.6–15.6)
EOSINOPHIL # BLD: 2.7 % (ref 0–4.5)
GLUCOSE SERPL-MCNC: 82 MG/DL (ref 74–106)
HCT VFR BLD CALC: 41.4 % (ref 32.4–45.2)
HGB BLD-MCNC: 13.1 GM/DL (ref 10.7–15.3)
KETONES UR QL STRIP: NEGATIVE
LEUKOCYTE ESTERASE UR QL STRIP.AUTO: NEGATIVE
LIPASE SERPL-CCNC: 77 U/L (ref 73–393)
LYMPHOCYTES # BLD: 22.1 % (ref 8–40)
MAGNESIUM SERPL-MCNC: 1.9 MG/DL (ref 1.8–2.4)
MCH RBC QN AUTO: 25.8 PG (ref 25.7–33.7)
MCHC RBC AUTO-ENTMCNC: 31.6 G/DL (ref 32–36)
MCV RBC: 81.7 FL (ref 80–96)
MONOCYTES # BLD AUTO: 6 % (ref 3.8–10.2)
NEUTROPHILS # BLD: 68.7 % (ref 42.8–82.8)
NITRITE UR QL STRIP: NEGATIVE
PH UR: 5 [PH] (ref 5–8)
PLATELET # BLD AUTO: 202 K/MM3 (ref 134–434)
PMV BLD: 8.4 FL (ref 7.5–11.1)
POTASSIUM SERPLBLD-SCNC: 3.5 MMOL/L (ref 3.5–5.1)
PROT SERPL-MCNC: 6.8 G/DL (ref 6.4–8.2)
PROT UR QL STRIP: (no result)
PROT UR QL STRIP: NEGATIVE
RBC # BLD AUTO: 5.07 M/MM3 (ref 3.6–5.2)
SODIUM SERPL-SCNC: 140 MMOL/L (ref 136–145)
SP GR UR: 1.01 (ref 1.01–1.03)
UROBILINOGEN UR STRIP-MCNC: NEGATIVE MG/DL (ref 0.2–1)
WBC # BLD AUTO: 9.2 K/MM3 (ref 4–10)

## 2018-11-14 PROCEDURE — 3E033NZ INTRODUCTION OF ANALGESICS, HYPNOTICS, SEDATIVES INTO PERIPHERAL VEIN, PERCUTANEOUS APPROACH: ICD-10-PCS

## 2018-11-14 NOTE — PDOC
History of Present Illness





- General


Chief Complaint: Pain


Stated Complaint: abdominal pain


Time Seen by Provider: 18 07:34


History Source: Patient


Exam Limitations: No Limitations





- History of Present Illness


Travel History: No


Initial Comments: 





18 08:00


83-year-old female presents to the emergency department with complaints of left-

sided abdominal cramping increasing in severity over the past few days which 

awoke her this morning from sleep and her daughter said she was noted to be 

talking to a  family member. She states has not had a bowel movement in 

over 3 days and has had decreased appetite for the past 4 days now with 

intermittent chills. Patient denies difficulty urinating, rectal bleeding, 

abdominal distention, decreased flatulence, difficulty breathing, or chest pain.


Timing/Duration: reports: getting worse


Quality: reports: moderate, cramping


Abdominal Pain Onset Location: reports: LUQ, LLQ


Pain Radiation: reports: no radiation


Activities at Onset: reports: none


Aggravating Factors: improves with: None


Alleviating Factors: improves with: None





Past History





- Travel


Traveled outside of the country in the last 30 days: No





- Past Medical History


Allergies/Adverse Reactions: 


 Allergies











Allergy/AdvReac Type Severity Reaction Status Date / Time


 


No Known Allergies Allergy   Verified 18 07:22











Home Medications: 


Ambulatory Orders





Glimepiride [Amaryl -] 4 mg PO BID 18 


Albuterol 0.083% Nebulizer Sol [Ventolin 0.083% Nebulizer Soln -] 1 amp NEB Q4H 

PRN #30 amp MDD 4 18 


Memantine HCl [Namenda -] 10 mg PO DAILY@0800 #30 tablet MDD 1 18 


Brimonidine Tartrate/Timolol [Combigan 0.2%-0.5% Eye Drops] 5 ml OP DAILY  


Exenatide Microspheres [Bydureon Pen] 2 mg SQ WEEKLY 18 


Linaclotide [Linzess] 72 mcg PO DAILY 18 


Methimazole [Tapazole -] 10 mg PO DAILY MDD 2 18 


Pantoprazole Sodium [Protonix] 40 mg PO DAILY 18 


Sacubitril/Valsartan [Entresto 24 mg-26 mg Tablet] 1 each PO BID 18 


Atorvastatin Ca [Lipitor] 20 mg PO HS #30 tablet 18 


Docusate Sodium [Colace -] 100 mg PO Q8H PRN #90 capsule 18 


Furosemide [Lasix -] 40 mg PO DAILY #30 tablet 18 


Linaclotide [Linzess] 72 mcg PO DAILY #30 capsule 18 


Metoprolol Succinate [Toprol XL -] 50 mg PO DAILY #30 tab.sr.24h 18 


Polyethylene Glycol 3350 [Miralax 119 gm Btl -] 17 gm PO DAILY #1 bottle  


Rivaroxaban [Xarelto -] 20 mg PO DAILY@2000 #30 tablet 18 


Sennosides [Senna -] 2 tab PO HS PRN #60 tablet 18 








Asthma: Yes


COPD: Yes


CHF: Yes


Dementia: Yes


Diabetes: Yes (Type 2 DM)


HTN: Yes


Hypercholesterolemia: Yes


Thyroid Disease: Yes (hypothyroidism)





- Suicide/Smoking/Psychosocial Hx


Smoking Status: No


Smoking History: Former smoker


Have you smoked in the past 12 months: No


Number of Cigarettes Smoked Daily: 0


If you are a former smoker, when did you quit?: 60years ago


Information on smoking cessation initiated: No


Hx Alcohol Use: No


Drug/Substance Use Hx: No


Substance Use Type: None


Patient Lives Alone: No


Lives with/in: daughter





Abd/GI Specific PMHX





- Complaint Specific PMHX


Other History: constipation





**Review of Systems





- Review of Systems


Able to Perform ROS?: Yes


Constitutional: Yes: Chills


HEENTM: No: Symptoms Reported


Respiratory: No: Symptoms reported


Cardiac (ROS): No: Symptoms Reported


ABD/GI: Yes: Constipated, Nausea, Poor Appetite, Poor Fluid Intake, Abdominal 

cramping


: No: Symptoms Reported


Musculoskeletal: No: Symptoms Reported


Integumentary: No: Symptoms Reported


Neurological: No: Symptoms reported


Hematologic/Lymphatic: No: Symptoms Reported





*Physical Exam





- Vital Signs


 Last Vital Signs











Temp Pulse Resp BP Pulse Ox


 


 98.6 F   84   18   108/55 L  94 L


 


 18 07:23  18 07:23  18 07:23  18 07:23  18 07:23














- Physical Exam


General Appearance: Yes: Nourished, Appropriately Dressed.  No: Apparent 

Distress


HEENT: positive: EOMI, CURTIS.  negative: Pale Conjunctivae


Neck: positive: Supple


Respiratory/Chest: positive: Lungs Clear, Normal Breath Sounds.  negative: 

Respiratory Distress, Accessory Muscle Use


Cardiovascular: positive: Regular Rhythm, Regular Rate.  negative: Murmur


Gastrointestinal/Abdominal: positive: Normal Bowel Sounds, Soft, Distended (mild

), Tenderness (llq lt periumbilical).  negative: Guarding, Rebound, Mass


Musculoskeletal: negative: CVA Tenderness


Extremity: positive: Normal Capillary Refill, Pedal Edema (trace dieudonne)


Integumentary: positive: Normal Color, Warm, Moist


Neurologic: positive: Normal Mood/Affect (a & o x 3), Motor Strength 5/5





ED Treatment Course





- LABORATORY


CBC & Chemistry Diagram: 


 18 10:38





 18 08:20





- RADIOLOGY


Radiology Studies Ordered: 














 Category Date Time Status


 


 ABDOMEN & PELVIS CT WITH CONTR [CT] Stat CT Scan  18 08:02 Ordered


 


 CHEST X-RAY PORTABLE* [RAD] Stat Radiology  18 08:03 Ordered














Medical Decision Making





- Medical Decision Making





18 08:34


CC: abd pain, constipation, chills x 3-5 days


Exam: abd mild distended, llq pain


Plan: labs, urine, cxr, lactic acid, abd /pelvis ct w/ contrast


18 12:52


 Laboratory Tests











  18





  08:20 08:20 10:38


 


WBC    9.2


 


Hgb    13.1


 


Hct    41.4  D


 


MCV    81.7


 


RDW    15.7 H


 


Neutrophils %    68.7


 


Sodium  140  


 


Potassium  3.5  


 


Chloride  99  


 


Carbon Dioxide  30  


 


Anion Gap  11  


 


BUN  32 H  


 


Creatinine  1.1  


 


Random Glucose  82  


 


Lactic Acid   0.8 


 


Calcium  9.6  


 


Magnesium  1.9  


 


Total Bilirubin  1.2 H  


 


AST  25  


 


ALT  25  


 


Alkaline Phosphatase  172 H  


 


Total Protein  6.8  


 


Albumin  3.4  


 


Lipase  77  


 


Urine Glucose (UA)   


 


Urine Ketones   


 


Urine Urobilinogen   


 


Ur Leukocyte Esterase   














  18





  11:41


 


WBC 


 


Hgb 


 


Hct 


 


MCV 


 


RDW 


 


Neutrophils % 


 


Sodium 


 


Potassium 


 


Chloride 


 


Carbon Dioxide 


 


Anion Gap 


 


BUN 


 


Creatinine 


 


Random Glucose 


 


Lactic Acid 


 


Calcium 


 


Magnesium 


 


Total Bilirubin 


 


AST 


 


ALT 


 


Alkaline Phosphatase 


 


Total Protein 


 


Albumin 


 


Lipase 


 


Urine Glucose (UA)  2+ H


 


Urine Ketones  Negative


 


Urine Urobilinogen  Negative


 


Ur Leukocyte Esterase  Negative








Ct results pending


18 13:58


CT abd shows gallbladder multiple gallstones again seen layering posteriorly 

without evidence of acute cholecystitis. Small left periumbilical/Supra-

periumbilical hernia containing fat/mesentery in the small bowel loops again 

seen without gross evidence of incarceration. There is no evidence of small 

bowel instruction.





*DC/Admit/Observation/Transfer


Diagnosis at time of Disposition: 


 Abdominal pain








- Discharge Dispostion


Disposition: HOME


Condition at time of disposition: Improved





- Referrals


Referrals: 


Harsh Del Real PA [Primary Care Provider] - 





- Patient Instructions


Printed Discharge Instructions:  DI for Abdominal Pain-Adult


Additional Instructions: 


Please eat small frequent meals and increase your fiber intake.


Follow-up with your doctor or return to the ED if your symptoms return or worsen





- Post Discharge Activity

## 2018-11-15 NOTE — EKG
Test Reason : 

Blood Pressure : ***/*** mmHG

Vent. Rate : 081 BPM     Atrial Rate : 243 BPM

   P-R Int : 000 ms          QRS Dur : 088 ms

    QT Int : 372 ms       P-R-T Axes : 080 -36 154 degrees

   QTc Int : 432 ms

 

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

LEFT AXIS DEVIATION

INFERIOR INFARCT , AGE UNDETERMINED

ANTEROSEPTAL INFARCT (CITED ON OR BEFORE 23-FEB-2018)



ABNORMAL ECG

WHEN COMPARED WITH ECG OF 16-AUG-2018 08:27,

ATRIAL FLUTTER HAS REPLACED ATRIAL FIBRILLATION

INFERIOR INFARCT IS NOW PRESENT

Confirmed by TIP MOSS MD ([id number]) on 11/15/2018 11:53:35 AM

 

Referred By:             Confirmed By:TIP MOSS MD

## 2018-11-26 ENCOUNTER — HOSPITAL ENCOUNTER (INPATIENT)
Dept: HOSPITAL 74 - JER | Age: 83
LOS: 10 days | Discharge: HOME | DRG: 445 | End: 2018-12-06
Attending: INTERNAL MEDICINE | Admitting: INTERNAL MEDICINE
Payer: COMMERCIAL

## 2018-11-26 VITALS — BODY MASS INDEX: 36.3 KG/M2

## 2018-11-26 DIAGNOSIS — E03.9: ICD-10-CM

## 2018-11-26 DIAGNOSIS — I48.92: ICD-10-CM

## 2018-11-26 DIAGNOSIS — I12.9: ICD-10-CM

## 2018-11-26 DIAGNOSIS — K80.00: Primary | ICD-10-CM

## 2018-11-26 DIAGNOSIS — E05.90: ICD-10-CM

## 2018-11-26 DIAGNOSIS — R11.2: ICD-10-CM

## 2018-11-26 DIAGNOSIS — J44.9: ICD-10-CM

## 2018-11-26 DIAGNOSIS — E87.6: ICD-10-CM

## 2018-11-26 DIAGNOSIS — R07.89: ICD-10-CM

## 2018-11-26 DIAGNOSIS — E78.5: ICD-10-CM

## 2018-11-26 DIAGNOSIS — E11.22: ICD-10-CM

## 2018-11-26 DIAGNOSIS — R10.13: ICD-10-CM

## 2018-11-26 DIAGNOSIS — E87.1: ICD-10-CM

## 2018-11-26 DIAGNOSIS — N20.0: ICD-10-CM

## 2018-11-26 DIAGNOSIS — E66.9: ICD-10-CM

## 2018-11-26 DIAGNOSIS — N18.2: ICD-10-CM

## 2018-11-26 DIAGNOSIS — R94.8: ICD-10-CM

## 2018-11-26 DIAGNOSIS — N17.9: ICD-10-CM

## 2018-11-26 DIAGNOSIS — I48.91: ICD-10-CM

## 2018-11-26 DIAGNOSIS — I24.8: ICD-10-CM

## 2018-11-26 LAB
ALBUMIN SERPL-MCNC: 3.2 G/DL (ref 3.4–5)
ALP SERPL-CCNC: 133 U/L (ref 45–117)
ALT SERPL-CCNC: 26 U/L (ref 13–61)
ANION GAP SERPL CALC-SCNC: 11 MMOL/L (ref 8–16)
APPEARANCE UR: CLEAR
AST SERPL-CCNC: 37 U/L (ref 15–37)
BACTERIA #/AREA URNS HPF: (no result) /HPF
BASOPHILS # BLD: 1 % (ref 0–2)
BILIRUB SERPL-MCNC: 1.3 MG/DL (ref 0.2–1)
BILIRUB UR STRIP.AUTO-MCNC: NEGATIVE MG/DL
BUN SERPL-MCNC: 57 MG/DL (ref 7–18)
CALCIUM SERPL-MCNC: 9.4 MG/DL (ref 8.5–10.1)
CHLORIDE SERPL-SCNC: 89 MMOL/L (ref 98–107)
CO2 SERPL-SCNC: 31 MMOL/L (ref 21–32)
COLOR UR: (no result)
CREAT SERPL-MCNC: 2.2 MG/DL (ref 0.55–1.3)
DEPRECATED RDW RBC AUTO: 16.2 % (ref 11.6–15.6)
EOSINOPHIL # BLD: 1.6 % (ref 0–4.5)
EPITH CASTS URNS QL MICRO: (no result) /HPF
GLUCOSE SERPL-MCNC: 221 MG/DL (ref 74–106)
HCT VFR BLD CALC: 41.1 % (ref 32.4–45.2)
HGB BLD-MCNC: 14.1 GM/DL (ref 10.7–15.3)
HYALINE CASTS URNS QL MICRO: 1 /LPF
INR BLD: 1.08 (ref 0.83–1.09)
KETONES UR QL STRIP: NEGATIVE
LEUKOCYTE ESTERASE UR QL STRIP.AUTO: (no result)
LIPASE SERPL-CCNC: 125 U/L (ref 73–393)
LYMPHOCYTES # BLD: 23.5 % (ref 8–40)
MAGNESIUM SERPL-MCNC: 2.3 MG/DL (ref 1.8–2.4)
MCH RBC QN AUTO: 27.4 PG (ref 25.7–33.7)
MCHC RBC AUTO-ENTMCNC: 34.2 G/DL (ref 32–36)
MCV RBC: 79.9 FL (ref 80–96)
MONOCYTES # BLD AUTO: 8.4 % (ref 3.8–10.2)
NEUTROPHILS # BLD: 65.5 % (ref 42.8–82.8)
NITRITE UR QL STRIP: NEGATIVE
PH UR: 7 [PH] (ref 5–8)
PLATELET # BLD AUTO: 249 K/MM3 (ref 134–434)
PMV BLD: 8.6 FL (ref 7.5–11.1)
POTASSIUM SERPLBLD-SCNC: 3.8 MMOL/L (ref 3.5–5.1)
PROT SERPL-MCNC: 6.6 G/DL (ref 6.4–8.2)
PROT UR QL STRIP: (no result)
PROT UR QL STRIP: NEGATIVE
PT PNL PPP: 12.7 SEC (ref 9.7–13)
RBC # BLD AUTO: 5.14 M/MM3 (ref 3.6–5.2)
SODIUM SERPL-SCNC: 130 MMOL/L (ref 136–145)
SP GR UR: 1.01 (ref 1.01–1.03)
UROBILINOGEN UR STRIP-MCNC: NEGATIVE MG/DL (ref 0.2–1)
WBC # BLD AUTO: 8.9 K/MM3 (ref 4–10)
YEAST #/AREA URNS HPF: (no result) /[HPF]

## 2018-11-26 PROCEDURE — G0008 ADMIN INFLUENZA VIRUS VAC: HCPCS

## 2018-11-26 PROCEDURE — A9537 TC99M MEBROFENIN: HCPCS

## 2018-11-26 RX ADMIN — ATORVASTATIN CALCIUM SCH MG: 20 TABLET, FILM COATED ORAL at 22:38

## 2018-11-26 NOTE — PDOC
History of Present Illness





- General


History Source: Patient, Family (Daughter)


Exam Limitations: Other (pt and daughter very poor historians, even with 

 service; do not directly answer questions until multiple promptings.)





<Mandy Ayon - Last Filed: 11/26/18 18:51>





<Skylar Carvajal - Last Filed: 11/26/18 21:09>





- General


Chief Complaint: Pain, Acute


Stated Complaint: ABD PAIN


Time Seen by Provider: 11/26/18 15:30





Past History





- Travel


Traveled outside of the country in the last 30 days: No


Close contact w/someone who was outside of country & ill: No





- Past Medical History


Asthma: Yes


COPD: Yes


CHF: Yes


Dementia: Yes


Diabetes: Yes (Type 2 DM)


HTN: Yes


Hypercholesterolemia: Yes


Thyroid Disease: Yes (hypothyroidism)





- Immunization History


Immunization Up to Date: Yes





- Suicide/Smoking/Psychosocial Hx


Smoking Status: No


Smoking History: Unknown if ever smoked


Have you smoked in the past 12 months: No


Number of Cigarettes Smoked Daily: 0


If you are a former smoker, when did you quit?: 60years ago


Information on smoking cessation initiated: No


Hx Alcohol Use: No


Drug/Substance Use Hx: No


Substance Use Type: None





<Mandy Ayon - Last Filed: 11/26/18 18:51>





<Skylar Carvajal - Last Filed: 11/26/18 21:09>





- Past Medical History


Allergies/Adverse Reactions: 


 Allergies











Allergy/AdvReac Type Severity Reaction Status Date / Time


 


No Known Allergies Allergy   Verified 11/26/18 20:09











Home Medications: 


Ambulatory Orders





Albuterol 0.083% Nebulizer Sol [Ventolin 0.083% Nebulizer Soln -] 1 amp NEB Q4H 

PRN #30 amp MDD 4 06/08/18 


Memantine HCl [Namenda -] 10 mg PO DAILY@0800 #30 tablet MDD 1 06/08/18 


Brimonidine Tartrate/Timolol [Combigan 0.2%-0.5% Eye Drops] 5 ml OP DAILY 08/14/ 18 


Exenatide Microspheres [Bydureon Pen] 2 mg SQ WEEKLY 08/14/18 


Methimazole [Tapazole -] 10 mg PO DAILY MDD 2 08/14/18 


Pantoprazole Sodium [Protonix] 40 mg PO DAILY 08/14/18 


Atorvastatin Ca [Lipitor] 20 mg PO HS #30 tablet 08/20/18 


Furosemide [Lasix -] 40 mg PO DAILY #30 tablet 08/20/18 


Rivaroxaban [Xarelto -] 20 mg PO DAILY@2000 #30 tablet 08/20/18 


Amlodipine Besylate [Norvasc -] 10 mg PO DAILY 11/26/18 


Aspirin Coated [Ecotrin -] 81 mg PO DAILY 11/26/18 


Digoxin 125 mcg PO DAILY 11/26/18 


Insulin Glargine,Hum.rec.anlog [Lantus Solostar PEN (NF)] 40 units SQ HS 11/26/ 18 


Metolazone [Zaroxolyn -] 2.5 mg PO DAILY 11/26/18 


Metoprolol Succinate [Toprol XL -] 100 mg PO DAILY 11/26/18 


Omeprazole Magnesium [Acid Reducer] 40 mg PO DAILY 11/26/18 


Ondansetron HCl [Zofran] 4 mg PO TID PRN 11/26/18 


Tramadol HCl/Acetaminophen [Tramadol-Acetaminophn 37.5-325] 1 each PO QID PRN 11 /26/18 











**Review of Systems





- Review of Systems


Able to Perform ROS?: Yes (limited)


Is the patient limited English proficient: Yes


Constitutional: Yes: Loss of Appetite, Weight Stable.  No: Chills, Diaphoresis, 

Fever, Weakness





<Mandy Ayon - Last Filed: 11/26/18 18:51>





*Physical Exam





- Vital Signs


 Last Vital Signs











Temp Pulse Resp BP Pulse Ox


 


 98.0 F   88   16   94/54 L  96 


 


 11/26/18 13:20  11/26/18 13:20  11/26/18 13:20  11/26/18 13:20  11/26/18 13:20














<Mandy Ayon - Last Filed: 11/26/18 18:51>





- Vital Signs


 Last Vital Signs











Temp Pulse Resp BP Pulse Ox


 


 98.0 F   82   17   92/54 L  97 


 


 11/26/18 13:20  11/26/18 20:50  11/26/18 20:50  11/26/18 20:50  11/26/18 20:50














<Skylar Carvajal - Last Filed: 11/26/18 21:09>





ED Treatment Course





- LABORATORY


CBC & Chemistry Diagram: 


 11/26/18 16:30





 11/26/18 16:30





<Mandy Ayon - Last Filed: 11/26/18 18:51>





- LABORATORY


CBC & Chemistry Diagram: 


 11/26/18 16:30





 11/26/18 16:30





- ADDITIONAL ORDERS


Additional order review: 


 Laboratory  Results











  11/26/18 11/26/18 11/26/18





  16:37 16:30 16:30


 


PT with INR    12.70


 


INR    1.08


 


Sodium   130 L 


 


Potassium   3.8 


 


Chloride   89 L 


 


Carbon Dioxide   31 


 


Anion Gap   11 


 


BUN   57 H 


 


Creatinine   2.2 H 


 


Creat Clearance w eGFR   21.32 


 


Random Glucose   221 H 


 


Lactic Acid  2.1 H  


 


Calcium   9.4 


 


Magnesium   2.3 


 


Total Bilirubin   1.3 H 


 


AST   37 


 


ALT   26 


 


Alkaline Phosphatase   133 H 


 


Troponin I   0.19 H 


 


Total Protein   6.6 


 


Albumin   3.2 L 


 


Lipase   125 








 











  11/26/18





  16:30


 


RBC  5.14


 


MCV  79.9 L


 


MCHC  34.2


 


RDW  16.2 H


 


MPV  8.6


 


Neutrophils %  65.5


 


Lymphocytes %  23.5


 


Monocytes %  8.4


 


Eosinophils %  1.6


 


Basophils %  1.0














- Medications


Given in the ED: 


ED Medications














Discontinued Medications














Generic Name Dose Route Start Last Admin





  Trade Name Freq  PRN Reason Stop Dose Admin


 


Acetaminophen  1,000 mg  11/26/18 16:00  11/26/18 16:30





  Ofirmev Injection -  IVPB  11/26/18 16:01  1,000 mg





  ONCE ONE   Administration





     





     





     





     


 


Aspirin  325 mg  11/26/18 18:17  11/26/18 19:17





  Asa -  PO  11/26/18 18:18  325 mg





  ONCE STA   Administration





     





     





     





     


 


Sodium Chloride  500 mls @ 1,000 mls/hr  11/26/18 16:04  11/26/18 16:30





  Normal Saline -  IV  11/26/18 16:33  1,000 mls/hr





  ASDIR STA   Administration





     





     





     





     


 


Sodium Chloride  500 mls @ 1,000 mls/hr  11/26/18 18:17  11/26/18 19:00





  Normal Saline -  IV  11/26/18 18:46  1,000 mls/hr





  ASDIR STA   Administration





     





     





     





     














<Skylar Carvajal - Last Filed: 11/26/18 21:09>





Medical Decision Making





- Medical Decision Making


Pt was seen at bedside, also will be seen by attending Dr. Ruchi. Pt presenting 

with LUQ and epigastric pain, which has been going on for ~1 month, but has 

worsened over the past few days. Pt and daughter state the abdominal pain has 

been intermittent, but is sharp and stabbing in quality and does not radiate. 

It is associated with nausea, but not vomiting according to patient.


PE showed []


Considering [vs vs]


Ordered work-up including CBC, CMP, lipase, lactic, ECG, troponin, UA, urine 

culture. Ordered chest x-ray and limited abdomen US for GB/pancreas. 


Provided 500 mL IV NS and 1 g ofirmev IV for improvement of pain and 

dehydration.


Will continue to reassess pt and monitor for symptomatic improvement.


11/26/18 16:11





Paging pts GI, Dr. Ashlie Solis ([phone]) to determine request for 

further imaging -- pt arrived with note from Dr. Solis about HIDA scan/prior 

CT scan.


11/26/18 16:30





Spoke with Dr. Solis who stated pt had distended GB on prior work-up via CT 

scan. She recommends HIDA scan. Will start with labwork and abdominal US.


11/26/18 16:49





CBC: WNL


CMP: MARLEEN (BUN 57/Cr 2.2) 


Trop .19 (increased demand?)


Coags generally WNL


Providing pt with additional 500 mL IV NS bolus (1 L total). 





[id number] US/ABDOMEN US -LIMITED


abdomen ultrasound


Clinical information given: LUQ pain, evaluate for GB/jorge, and pancreas


As also visualized on abdomen CT study 11/14/2018 the gallbladder is 

overdistended


measuring 13 cm in length with a 5 cm transverse diameter. No definite 

gallbladder wall


edema or pericholecystic fluid is identified. Several small gallbladder calculi 

are seen. There


is also a moderate amount of inspissated bile/sludge within the gallbladder 

lumen.


The common bile duct diameter appears unremarkable measuring 0.5 cm. No gross


intraductal calculus is identified within the limitations of transabdominal 

sonography.


The pancreas could not be adequately evaluated due to obscuring bowel gas.


The liver, spleen, and kidneys demonstrate no definite sonographic pathology.


No free intraperitoneal fluid is seen.


There is no aortic aneurysm.


Impression:


As on a recently performed CT study 11/14/2018 the gallbladder demonstrates 

nonspecific


overdistention without obvious sonographic evidence of acute cholecystitis. 

Cholelithiasis is


again noted. A moderate amount of inspissated bile/sludge is noted within the 

gallbladder


lumen.


There is no definite biliary tract dilatation.


The pancreas is obscured due to overlapping bowel gas.


A 2 mm nonobstructing left renal calculus identified on CT performed 8/19/2018 

cannot be


appreciated on sonography.


11/26/18 18:32





Paging hospitalist team for admission.


11/26/18 18:33





Dr. Manriquez accepted pt for admission.


Pt BP improving 106/46.


11/26/18 18:51





Pt signed out to next resident team. Explained presentation, ED course, any 

pending results, and needed interventions to resident Dr. Barroso.


Ordered next troponin and lactic acid, nursing team aware. Also need urine 

sample, which pt has not provided yet. Will straight cath if necessary. 


11/26/18 18:58








<Mandy Ayon - Last Filed: 11/26/18 18:51>





*DC/Admit/Observation/Transfer





<Mandy Ayon - Last Filed: 11/26/18 18:51>





- Discharge Dispostion


Decision to Admit order: Yes





<Skylar Carvajal - Last Filed: 11/26/18 21:09>


Diagnosis at time of Disposition: 


 LUQ abdominal pain, Chest pain

## 2018-11-26 NOTE — HP
Admitting History and Physical





- Primary Care Physician


PCP: Tomi Manriquez





- Admission


Chief Complaint: chest pain


History of Present Illness: 


 83 year old female with a significant past medical history of T2DM, HTN, HLD, 

afib, COPD, asthma, hypothyroidism, who presents to the ED for several months 

of epgastric pain/left upper chest pain with increase in pain over the past few 

days with new onset of nausea today, her pain radiated to left arm and 

shoulder. She also states she has been constipated for about 5 days with one 

small BM this morning which she describes as hard. She was sent from her GI 

specialist, Dr. Solis, today. 











- Past Medical History


Cardiovascular: Yes: AFIB, HTN, Hyperlipdemia


Pulmonary: Yes: COPD


Endocrine: Yes: Diabetes Mellitus, Hyperthyroidism





- Smoking History


Smoking history: Unknown if ever smoked


Have you smoked in the past 12 months: No


Aproximately how many cigarettes per day: 0


If you are a former smoker, when did you quit?: 60years ago





- Alcohol/Substance Use


Hx Alcohol Use: No





Home Medications





- Allergies


Allergies/Adverse Reactions: 


 Allergies











Allergy/AdvReac Type Severity Reaction Status Date / Time


 


No Known Allergies Allergy   Verified 11/26/18 20:09














- Home Medications


Home Medications: 


Ambulatory Orders





Albuterol 0.083% Nebulizer Sol [Ventolin 0.083% Nebulizer Soln -] 1 amp NEB Q4H 

PRN #30 amp MDD 4 06/08/18 


Memantine HCl [Namenda -] 10 mg PO DAILY@0800 #30 tablet MDD 1 06/08/18 


Brimonidine Tartrate/Timolol [Combigan 0.2%-0.5% Eye Drops] 5 ml OP DAILY 08/14/ 18 


Exenatide Microspheres [Bydureon Pen] 2 mg SQ WEEKLY 08/14/18 


Methimazole [Tapazole -] 10 mg PO DAILY MDD 2 08/14/18 


Pantoprazole Sodium [Protonix] 40 mg PO DAILY 08/14/18 


Atorvastatin Ca [Lipitor] 20 mg PO HS #30 tablet 08/20/18 


Furosemide [Lasix -] 40 mg PO DAILY #30 tablet 08/20/18 


Rivaroxaban [Xarelto -] 20 mg PO DAILY@2000 #30 tablet 08/20/18 


Amlodipine Besylate [Norvasc -] 10 mg PO DAILY 11/26/18 


Aspirin Coated [Ecotrin -] 81 mg PO DAILY 11/26/18 


Digoxin 125 mcg PO DAILY 11/26/18 


Insulin Glargine,Hum.rec.anlog [Lantus Solostar PEN (NF)] 40 units SQ HS 11/26/ 18 


Metolazone [Zaroxolyn -] 2.5 mg PO DAILY 11/26/18 


Metoprolol Succinate [Toprol XL -] 100 mg PO DAILY 11/26/18 


Omeprazole Magnesium [Acid Reducer] 40 mg PO DAILY 11/26/18 


Ondansetron HCl [Zofran] 4 mg PO TID PRN 11/26/18 


Tramadol HCl/Acetaminophen [Tramadol-Acetaminophn 37.5-325] 1 each PO QID PRN 11 /26/18 











Physical Examination


Vital Signs: 


 Vital Signs











Temperature  98.0 F   11/26/18 13:20


 


Pulse Rate  88   11/26/18 13:20


 


Respiratory Rate  16   11/26/18 13:20


 


Blood Pressure  94/54 L  11/26/18 13:20


 


O2 Sat by Pulse Oximetry (%)  96   11/26/18 13:20











Constitutional: Yes: No Distress


HENT: Yes: Atraumatic


Neck: Yes: Supple


Cardiovascular: Yes: Regular Rate and Rhythm


Respiratory: Yes: CTA Bilaterally


Gastrointestinal: Yes: Normal Bowel Sounds


Extremities: Yes: WNL


Edema: No


Peripheral Pulses WNL: Yes


Neurological: Yes: Alert, Oriented


Labs: 


 CBC, BMP





 11/26/18 16:30 





 11/26/18 16:30 











Problem List





- Problems


(1) Chest pain


Assessment/Plan: 


tele monitoring


fu cardiac enzymes


on xeralto


Code(s): R07.9 - CHEST PAIN, UNSPECIFIED   





(2) Diabetes


Assessment/Plan: 


insulin


bgms...monitor


Code(s): E11.9 - TYPE 2 DIABETES MELLITUS WITHOUT COMPLICATIONS   


Qualifiers: 


   Diabetes mellitus type: type 2 





(3) Epigastric pain


Code(s): R10.13 - EPIGASTRIC PAIN   





(4) HLD (hyperlipidemia)


Assessment/Plan: 


on meds


Code(s): E78.5 - HYPERLIPIDEMIA, UNSPECIFIED   





(5) Hyperthyroidism


Code(s): E05.90 - THYROTOXICOSIS, UNSP WITHOUT THYROTOXIC CRISIS OR STORM   





(6) Obesity


Code(s): E66.9 - OBESITY, UNSPECIFIED   





Assessment/Plan





 Laboratory Tests











  11/26/18 11/26/18 11/26/18





  16:30 16:30 16:30


 


WBC  8.9  


 


RBC  5.14  


 


Hgb  14.1  


 


Hct  41.1  


 


MCV  79.9 L  


 


MCH  27.4  


 


MCHC  34.2  


 


RDW  16.2 H  


 


Plt Count  249  D  


 


MPV  8.6  


 


Absolute Neuts (auto)  5.8  


 


Neutrophils %  65.5  


 


Lymphocytes %  23.5  


 


Monocytes %  8.4  


 


Eosinophils %  1.6  


 


Basophils %  1.0  


 


Nucleated RBC %  0  


 


PT with INR   12.70 


 


INR   1.08 


 


Sodium    130 L


 


Potassium    3.8


 


Chloride    89 L


 


Carbon Dioxide    31


 


Anion Gap    11


 


BUN    57 H


 


Creatinine    2.2 H


 


Creat Clearance w eGFR    21.32


 


POC Glucometer   


 


Random Glucose    221 H


 


Lactic Acid   


 


Calcium    9.4


 


Magnesium    2.3


 


Total Bilirubin    1.3 H


 


AST    37


 


ALT    26


 


Alkaline Phosphatase    133 H


 


Creatine Kinase   


 


Troponin I    0.19 H


 


Total Protein    6.6


 


Albumin    3.2 L


 


Lipase    125


 


Urine Color   


 


Urine Appearance   


 


Urine pH   


 


Ur Specific Gravity   


 


Urine Protein   


 


Urine Glucose (UA)   


 


Urine Ketones   


 


Urine Blood   


 


Urine Nitrite   


 


Urine Bilirubin   


 


Urine Urobilinogen   


 


Ur Leukocyte Esterase   


 


Urine WBC (Auto)   


 


Urine RBC (Auto)   


 


Ur Epithelial Cells   


 


Urine Bacteria   


 


Hyaline Casts   


 


Urine Yeast   














  11/26/18 11/26/18 11/26/18





  16:37 18:57 18:57


 


WBC   


 


RBC   


 


Hgb   


 


Hct   


 


MCV   


 


MCH   


 


MCHC   


 


RDW   


 


Plt Count   


 


MPV   


 


Absolute Neuts (auto)   


 


Neutrophils %   


 


Lymphocytes %   


 


Monocytes %   


 


Eosinophils %   


 


Basophils %   


 


Nucleated RBC %   


 


PT with INR   


 


INR   


 


Sodium   


 


Potassium   


 


Chloride   


 


Carbon Dioxide   


 


Anion Gap   


 


BUN   


 


Creatinine   


 


Creat Clearance w eGFR   


 


POC Glucometer   


 


Random Glucose   


 


Lactic Acid  2.1 H  2.2 H* 


 


Calcium   


 


Magnesium   


 


Total Bilirubin   


 


AST   


 


ALT   


 


Alkaline Phosphatase   


 


Creatine Kinase    56


 


Troponin I    0.20 H


 


Total Protein   


 


Albumin   


 


Lipase   


 


Urine Color   


 


Urine Appearance   


 


Urine pH   


 


Ur Specific Gravity   


 


Urine Protein   


 


Urine Glucose (UA)   


 


Urine Ketones   


 


Urine Blood   


 


Urine Nitrite   


 


Urine Bilirubin   


 


Urine Urobilinogen   


 


Ur Leukocyte Esterase   


 


Urine WBC (Auto)   


 


Urine RBC (Auto)   


 


Ur Epithelial Cells   


 


Urine Bacteria   


 


Hyaline Casts   


 


Urine Yeast   














  11/26/18 11/27/18 11/27/18





  19:14 05:12 05:12


 


WBC   8.2 


 


RBC   4.87 


 


Hgb   12.6 


 


Hct   39.9 


 


MCV   82.1 


 


MCH   26.0 


 


MCHC   31.6 L 


 


RDW   16.6 H 


 


Plt Count   215 


 


MPV   7.7  D 


 


Absolute Neuts (auto)   4.3 


 


Neutrophils %   52.2  D 


 


Lymphocytes %   33.0  D 


 


Monocytes %   9.1 


 


Eosinophils %   4.8 H D 


 


Basophils %   0.9 


 


Nucleated RBC %   0 


 


PT with INR   


 


INR   


 


Sodium    136


 


Potassium    3.6


 


Chloride    95 L


 


Carbon Dioxide    32


 


Anion Gap    8


 


BUN    48 H


 


Creatinine    1.7 H


 


Creat Clearance w eGFR    28.70


 


POC Glucometer   


 


Random Glucose    61 L


 


Lactic Acid   


 


Calcium    8.9


 


Magnesium   


 


Total Bilirubin    1.0


 


AST    24


 


ALT    22


 


Alkaline Phosphatase    124 H


 


Creatine Kinase    56


 


Troponin I    0.15 H


 


Total Protein    6.1 L


 


Albumin    3.0 L


 


Lipase   


 


Urine Color  Ltyellow  


 


Urine Appearance  Clear  


 


Urine pH  7.0  D  


 


Ur Specific Gravity  1.009 L  


 


Urine Protein  Negative  


 


Urine Glucose (UA)  2+ H  


 


Urine Ketones  Negative  


 


Urine Blood  Negative  


 


Urine Nitrite  Negative  


 


Urine Bilirubin  Negative  


 


Urine Urobilinogen  Negative  


 


Ur Leukocyte Esterase  Trace  


 


Urine WBC (Auto)  15  


 


Urine RBC (Auto)  6  


 


Ur Epithelial Cells  Rare  


 


Urine Bacteria  Rare  


 


Hyaline Casts  1  


 


Urine Yeast  Many  














  11/27/18





  11:06


 


WBC 


 


RBC 


 


Hgb 


 


Hct 


 


MCV 


 


MCH 


 


MCHC 


 


RDW 


 


Plt Count 


 


MPV 


 


Absolute Neuts (auto) 


 


Neutrophils % 


 


Lymphocytes % 


 


Monocytes % 


 


Eosinophils % 


 


Basophils % 


 


Nucleated RBC % 


 


PT with INR 


 


INR 


 


Sodium 


 


Potassium 


 


Chloride 


 


Carbon Dioxide 


 


Anion Gap 


 


BUN 


 


Creatinine 


 


Creat Clearance w eGFR 


 


POC Glucometer  [id number]


 


Random Glucose 


 


Lactic Acid 


 


Calcium 


 


Magnesium 


 


Total Bilirubin 


 


AST 


 


ALT 


 


Alkaline Phosphatase 


 


Creatine Kinase 


 


Troponin I 


 


Total Protein 


 


Albumin 


 


Lipase 


 


Urine Color 


 


Urine Appearance 


 


Urine pH 


 


Ur Specific Gravity 


 


Urine Protein 


 


Urine Glucose (UA) 


 


Urine Ketones 


 


Urine Blood 


 


Urine Nitrite 


 


Urine Bilirubin 


 


Urine Urobilinogen 


 


Ur Leukocyte Esterase 


 


Urine WBC (Auto) 


 


Urine RBC (Auto) 


 


Ur Epithelial Cells 


 


Urine Bacteria 


 


Hyaline Casts 


 


Urine Yeast 








Active Medications











Generic Name Dose Route Start Last Admin





  Trade Name Torey  PRN Reason Stop Dose Admin


 


Acetaminophen  650 mg  11/26/18 21:44  





  Tylenol -  PO   





  Q6H PRN   





  FEVER   





     





     





     


 


Amlodipine Besylate  10 mg  11/27/18 10:00  11/27/18 15:44





  Norvasc -  PO   10 mg





  DAILY J[facility]   Administration





     





     





     





     


 


Apixaban  2.5 mg  11/27/18 22:00  





  Eliquis -  PO   





  BID JULIEN   





     





     





     





     


 


Atorvastatin Calcium  20 mg  11/26/18 22:00  11/26/18 22:38





  Lipitor -  PO   20 mg





  HS JULIEN   Administration





     





     





     





     


 


Brimonidine Tartrate  1 drop  11/27/18 10:00  11/27/18 15:44





  Alphagan 0.2% -  OU   Not Given





  DAILY JULIEN   





     





     





     





     


 


Sodium Chloride  1,000 mls @ 75 mls/hr  11/26/18 20:15  11/26/18 20:52





  Normal Saline -  IV   75 mls/hr





  ASDIR JULIEN   Administration





     





     





     





     


 


Influenza Virus Vaccine Quadrival  60 mcg  11/27/18 16:12  





  Flulaval Quad [id number]  IM  11/27/18 16:13  





  .ONCE ONE   





     





     





     





     


 


Insulin Detemir  40 units  11/26/18 22:00  11/26/18 22:37





  Levemir Vial  SQ   40 unit





  HS JULIEN   Administration





     





     





     





     


 


Losartan Potassium  25 mg  11/27/18 13:00  11/27/18 15:43





  Cozaar -  PO   25 mg





  DAILY JULIEN   Administration





     





     





     





     


 


Memantine  10 mg  11/27/18 08:00  11/27/18 15:44





  Namenda -  PO   10 mg





  DAILY@0800 JULIEN   Administration





     





     





     





     


 


Methimazole  10 mg  11/27/18 10:00  11/27/18 15:44





  Tapazole -  PO   10 mg





  DAILY J[facility]   Administration





     





     





     





     


 


Metoprolol Succinate  100 mg  11/27/18 10:00  11/27/18 15:44





  Toprol Xl -  PO   100 mg





  DAILY [facility]   Administration





     





     





     





     


 


Pantoprazole Sodium  40 mg  11/27/18 10:00  11/27/18 15:43





  Protonix Iv  IVPUSH   40 mg





  DAILY [facility]   Administration





     





     





     





     


 


Timolol Maleate  1 drop  11/27/18 10:00  11/27/18 15:44





  Timoptic 0.5%  OU   Not Given





  DAILY [facility]

## 2018-11-26 NOTE — PDOC
Attending Attestation





- HPI


HPI: 





11/26/18 16:20


The patient is an 83 year old female with a significant past medical history of 

T2DM, HTN, HLD, COPD, asthma, hypothyroidism, who presents to the ED for 

several months of left upper abdominal pain with increase in pain over the past 

few days with new onset of nausea today. She also states she has been 

constipated for about 5 days with one small BM this morning which she describes 

as hard. She was sent from her GI specialist, Dr. Solis, today. 





The patient denies chest pain, shortness of breath, headache and dizziness. The 

patient denies fever, chills, nausea, vomit, diarrhea and constipation. The 

patient denies dysuria, frequency, urgency and hematuria. 





PCP -Dr. Del Real








- Physicial Exam


PE: 


11/26/18 16:20


Vitals: Triage vital signs reviewed


General Appearance: No acute distress, well nourished, well developed


Head: Atraumatic


Eyes: Pupils equal reactive round, extraocular movement intact. 


Neck:  Supple; No nuchal rigidity


Chest Wall: Nontender


Cardiac: Regular rate and rhythm, no murmurs, no rubs, no gallops


Lungs: Clear to auscultation bilateral, good air movement bilaterally


Abdomen: (+) diffuse abdominal discomfort. Soft, nondistended, normal bowel 

sounds, nontender to palpation


Extremities: Full range of motion to all extremities, no cyanosis, clubbing, or 

edema


Skin:  Warm and dry, no rashes or lesions, no rash, no petechiae


Neuro:  AOX3; Cranial Nerves 2-12 grossly intact, Strength intact to all 

extremities, Sensation intact to all extremities, gait unassessed. 


Psych: Normal mood, normal affect








- Medical Decision Making





11/26/18 16:21


Documentation prepared by Marietta Blas, acting as medical scribe for Gabriel Hopper MD





11/26/18 16:30


Dr. Solis paged at this time requesting a call back for doctor to doctor. 





11/26/18 16:51


Pts case discussed with Dr. Solis





<Marietta Blas - Last Filed: 11/26/18 16:51>





- Resident


Resident Name: Mandy Ayon





- ED Attending Attestation


I have performed the following: I have examined & evaluated the patient, The 

case was reviewed & discussed with the resident, I agree w/resident's findings 

& plan, Exceptions are as noted





- Medical Decision Making





Patient sent to ED from GI office for HIDA scan and further evaluation.





Dr. Carvajal to follow up results





<Gabriel Hopper - Last Filed: 11/26/18 17:12>

## 2018-11-27 LAB
ALBUMIN SERPL-MCNC: 3 G/DL (ref 3.4–5)
ALP SERPL-CCNC: 124 U/L (ref 45–117)
ALT SERPL-CCNC: 22 U/L (ref 13–61)
ANION GAP SERPL CALC-SCNC: 8 MMOL/L (ref 8–16)
AST SERPL-CCNC: 24 U/L (ref 15–37)
BASOPHILS # BLD: 0.9 % (ref 0–2)
BILIRUB SERPL-MCNC: 1 MG/DL (ref 0.2–1)
BUN SERPL-MCNC: 48 MG/DL (ref 7–18)
CALCIUM SERPL-MCNC: 8.9 MG/DL (ref 8.5–10.1)
CHLORIDE SERPL-SCNC: 95 MMOL/L (ref 98–107)
CO2 SERPL-SCNC: 32 MMOL/L (ref 21–32)
CREAT SERPL-MCNC: 1.7 MG/DL (ref 0.55–1.3)
DEPRECATED RDW RBC AUTO: 16.6 % (ref 11.6–15.6)
EOSINOPHIL # BLD: 4.8 % (ref 0–4.5)
GLUCOSE SERPL-MCNC: 61 MG/DL (ref 74–106)
HCT VFR BLD CALC: 39.9 % (ref 32.4–45.2)
HGB BLD-MCNC: 12.6 GM/DL (ref 10.7–15.3)
LYMPHOCYTES # BLD: 33 % (ref 8–40)
MCH RBC QN AUTO: 26 PG (ref 25.7–33.7)
MCHC RBC AUTO-ENTMCNC: 31.6 G/DL (ref 32–36)
MCV RBC: 82.1 FL (ref 80–96)
MONOCYTES # BLD AUTO: 9.1 % (ref 3.8–10.2)
NEUTROPHILS # BLD: 52.2 % (ref 42.8–82.8)
PLATELET # BLD AUTO: 215 K/MM3 (ref 134–434)
PMV BLD: 7.7 FL (ref 7.5–11.1)
POTASSIUM SERPLBLD-SCNC: 3.6 MMOL/L (ref 3.5–5.1)
PROT SERPL-MCNC: 6.1 G/DL (ref 6.4–8.2)
RBC # BLD AUTO: 4.87 M/MM3 (ref 3.6–5.2)
SODIUM SERPL-SCNC: 136 MMOL/L (ref 136–145)
WBC # BLD AUTO: 8.2 K/MM3 (ref 4–10)

## 2018-11-27 RX ADMIN — TIMOLOL MALEATE SCH: 5 SOLUTION OPHTHALMIC at 15:44

## 2018-11-27 RX ADMIN — PANTOPRAZOLE SODIUM SCH MG: 40 INJECTION, POWDER, FOR SOLUTION INTRAVENOUS at 15:43

## 2018-11-27 RX ADMIN — APIXABAN SCH MG: 2.5 TABLET, FILM COATED ORAL at 23:04

## 2018-11-27 RX ADMIN — BRIMONIDINE TARTRATE SCH: 2 SOLUTION/ DROPS OPHTHALMIC at 15:44

## 2018-11-27 RX ADMIN — MEMANTINE SCH MG: 10 TABLET ORAL at 15:44

## 2018-11-27 RX ADMIN — METHIMAZOLE SCH MG: 10 TABLET ORAL at 15:44

## 2018-11-27 RX ADMIN — ATORVASTATIN CALCIUM SCH MG: 20 TABLET, FILM COATED ORAL at 23:04

## 2018-11-27 RX ADMIN — LOSARTAN POTASSIUM SCH MG: 50 TABLET, FILM COATED ORAL at 15:43

## 2018-11-27 NOTE — PN
Progress Note, Physician


History of Present Illness: 





doing well





- Current Medication List


Current Medications: 


Active Medications





Acetaminophen (Tylenol -)  650 mg PO Q6H PRN


   PRN Reason: FEVER


Amlodipine Besylate (Norvasc -)  10 mg PO DAILY [facility]


   Last Admin: 11/27/18 15:44 Dose:  10 mg


Apixaban (Eliquis -)  2.5 mg PO BID [facility]


Atorvastatin Calcium (Lipitor -)  20 mg PO HS [facility]


   Last Admin: 11/26/18 22:38 Dose:  20 mg


Brimonidine Tartrate (Alphagan 0.2% -)  1 drop OU DAILY [facility]


   Last Admin: 11/27/18 15:44 Dose:  Not Given


Sodium Chloride (Normal Saline -)  1,000 mls @ 75 mls/hr IV ASDIR [facility]


   Last Admin: 11/26/18 20:52 Dose:  75 mls/hr


Influenza Virus Vaccine Quadrival (Flulaval Quad [id number])  60 mcg IM .ONCE ONE


   Stop: 11/27/18 16:13


Insulin Detemir (Levemir Vial)  40 units SQ HS [facility]


   Last Admin: 11/26/18 22:37 Dose:  40 unit


Losartan Potassium (Cozaar -)  25 mg PO DAILY [facility]


   Last Admin: 11/27/18 15:43 Dose:  25 mg


Memantine (Namenda -)  10 mg PO DAILY@0800 [facility]


   Last Admin: 11/27/18 15:44 Dose:  10 mg


Methimazole (Tapazole -)  10 mg PO DAILY [facility]


   Last Admin: 11/27/18 15:44 Dose:  10 mg


Metoprolol Succinate (Toprol Xl -)  100 mg PO DAILY [facility]


   Last Admin: 11/27/18 15:44 Dose:  100 mg


Pantoprazole Sodium (Protonix Iv)  40 mg IVPUSH DAILY [facility]


   Last Admin: 11/27/18 15:43 Dose:  40 mg


Timolol Maleate (Timoptic 0.5%)  1 drop OU DAILY [facility]


   Last Admin: 11/27/18 15:44 Dose:  Not Given











- Objective


Vital Signs: 


 Vital Signs











Temperature  98.2 F   11/27/18 14:00


 


Pulse Rate  79   11/27/18 14:00


 


Respiratory Rate  18   11/27/18 14:00


 


Blood Pressure  109/60   11/27/18 14:00


 


O2 Sat by Pulse Oximetry (%)  98   11/27/18 16:01











Constitutional: Yes: No Distress


HENT: Yes: Atraumatic


Neck: Yes: Supple


Cardiovascular: Yes: Regular Rate and Rhythm


Respiratory: Yes: CTA Bilaterally


Gastrointestinal: Yes: Normal Bowel Sounds


Extremities: Yes: WNL


Neurological: Yes: Alert, Oriented


Labs: 


 CBC, BMP





 11/27/18 05:12 





 11/27/18 05:12 





 INR, PTT











INR  1.08  (0.83-1.09)   11/26/18  16:30    














Problem List





- Problems


(1) Chest pain


Assessment/Plan: 


tele monitoring


fu cardiac enzymes...trending down


on xeralto


Code(s): R07.9 - CHEST PAIN, UNSPECIFIED   





(2) Diabetes


Assessment/Plan: 


insulin


bgms...monitor


Code(s): E11.9 - TYPE 2 DIABETES MELLITUS WITHOUT COMPLICATIONS   


Qualifiers: 


   Diabetes mellitus type: type 2 





(3) Epigastric pain


Code(s): R10.13 - EPIGASTRIC PAIN   





(4) HLD (hyperlipidemia)


Code(s): E78.5 - HYPERLIPIDEMIA, UNSPECIFIED   





(5) Hyperthyroidism


Code(s): E05.90 - THYROTOXICOSIS, UNSP WITHOUT THYROTOXIC CRISIS OR STORM   





(6) Obesity


Code(s): E66.9 - OBESITY, UNSPECIFIED   





(7) MARLEEN (acute kidney injury)


Assessment/Plan: 


improving with hydration


Code(s): N17.9 - ACUTE KIDNEY FAILURE, UNSPECIFIED

## 2018-11-27 NOTE — EKG
Test Reason : 

Blood Pressure : ***/*** mmHG

Vent. Rate : 056 BPM     Atrial Rate : 249 BPM

   P-R Int : 000 ms          QRS Dur : 086 ms

    QT Int : 408 ms       P-R-T Axes : 085 -28 078 degrees

   QTc Int : 393 ms

 

ATRIAL FLUTTER WITH VARIABLE A-V BLOCK

INFERIOR INFARCT (CITED ON OR BEFORE 14-AUG-2018)

ANTEROSEPTAL INFARCT (CITED ON OR BEFORE 23-FEB-2018)

ABNORMAL ECG

 

Confirmed by MD VALE, DAMARIS ([id number]) on 11/27/2018 12:34:38 PM

 

Referred By:             Confirmed By:DAMARIS COLLAZO MD

## 2018-11-27 NOTE — CONSULT
Consult


Consult Specialty:: Nephrology


Reason for Consultation:: MARLEEN





- History of Present Illness


Chief Complaint: abdominal pain


History of Present Illness: 





Pt is an 83 year old female with pmhx of DM, HTN, COPD, HLD, asthma and 

hypothryroidism who presents to the ER with abdominal pain. She was found to be 

in renal failure and I was called to evaluate her. She denies shortness of 

breath. She complains and nausea and decreased appetite for the last 3 or 4 

days. She denies vomiting or diarrhea. She denies nsaid use and says she only 

takes the meds that are prescribed. She does complain of constipation. She 

denies hematuria or dysuria. 





- History Source


History Provided By: Patient, Medical Record





- Past Medical History


Cardio/Vascular: Yes: AFIB, HTN, Hyperlipdemia


Pulmonary: Yes: COPD


Endocrine: Yes: Diabetes Mellitus, Hyperthyroidism





- Alcohol/Substance Use


Hx Alcohol Use: No





- Smoking History


Smoking history: Unknown if ever smoked


Have you smoked in the past 12 months: No


Aproximately how many cigarettes per day: 0


If you are a former smoker, when did you quit?: 60years ago





Home Medications





- Allergies


Allergies/Adverse Reactions: 


 Allergies











Allergy/AdvReac Type Severity Reaction Status Date / Time


 


No Known Allergies Allergy   Verified 11/26/18 20:09














- Home Medications


Home Medications: 


Ambulatory Orders





Albuterol 0.083% Nebulizer Sol [Ventolin 0.083% Nebulizer Soln -] 1 amp NEB Q4H 

PRN #30 amp MDD 4 06/08/18 


Memantine HCl [Namenda -] 10 mg PO DAILY@0800 #30 tablet MDD 1 06/08/18 


Brimonidine Tartrate/Timolol [Combigan 0.2%-0.5% Eye Drops] 5 ml OP DAILY 08/14/ 18 


Exenatide Microspheres [Bydureon Pen] 2 mg SQ WEEKLY 08/14/18 


Methimazole [Tapazole -] 10 mg PO DAILY MDD 2 08/14/18 


Pantoprazole Sodium [Protonix] 40 mg PO DAILY 08/14/18 


Atorvastatin Ca [Lipitor] 20 mg PO HS #30 tablet 08/20/18 


Furosemide [Lasix -] 40 mg PO DAILY #30 tablet 08/20/18 


Rivaroxaban [Xarelto -] 20 mg PO DAILY@2000 #30 tablet 08/20/18 


Amlodipine Besylate [Norvasc -] 10 mg PO DAILY 11/26/18 


Aspirin Coated [Ecotrin -] 81 mg PO DAILY 11/26/18 


Digoxin 125 mcg PO DAILY 11/26/18 


Insulin Glargine,Hum.rec.anlog [Lantus Solostar PEN (NF)] 40 units SQ HS 11/26/ 18 


Metolazone [Zaroxolyn -] 2.5 mg PO DAILY 11/26/18 


Metoprolol Succinate [Toprol XL -] 100 mg PO DAILY 11/26/18 


Omeprazole Magnesium [Acid Reducer] 40 mg PO DAILY 11/26/18 


Ondansetron HCl [Zofran] 4 mg PO TID PRN 11/26/18 


Tramadol HCl/Acetaminophen [Tramadol-Acetaminophn 37.5-325] 1 each PO QID PRN 11 /26/18 











Family Disease History





- Family Disease History


Family History: Denies





Review of Systems





- Review of Systems


Constitutional: reports: Malaise.  denies: Chills, Fever


Eyes: reports: No Symptoms


HENT: reports: No Symptoms


Neck: reports: No Symptoms


Cardiovascular: reports: No Symptoms


Respiratory: reports: No Symptoms


Gastrointestinal: reports: Abdominal Pain, Nausea.  denies: Vomiting


Genitourinary: reports: No Symptoms


Musculoskeletal: reports: No Symptoms


Integumentary: reports: No Symptoms


Neurological: reports: No Symptoms


Endocrine: reports: No Symptoms


Hematology/Lymphatic: reports: No Symptoms


Psychiatric: reports: No Symptoms





Physical Exam


Vital Signs: 


 Vital Signs











Temperature  97.4 F L  11/27/18 12:01


 


Pulse Rate  69   11/27/18 12:01


 


Respiratory Rate  18   11/27/18 12:01


 


Blood Pressure  115/56 L  11/27/18 12:01


 


O2 Sat by Pulse Oximetry (%)  100   11/27/18 07:45











Constitutional: Yes: Calm


Eyes: Yes: Conjunctiva Clear


HENT: Yes: Atraumatic


Cardiovascular: Yes: S1, S2


Respiratory: Yes: CTA Bilaterally


Gastrointestinal: Yes: Normal Bowel Sounds, Soft, Abdomen, Obese


Renal/: Yes: WNL


Musculoskeletal: Yes: WNL


Edema: No


Neurological: Yes: Oriented


Psychiatric: Yes: Oriented


Labs: 


 CBC, BMP





 11/27/18 05:12 





 11/27/18 05:12 





 Laboratory Tests











  08/17/18 08/19/18 08/20/18





  07:30 07:00 06:30


 


Sodium   


 


Potassium   


 


Creatinine  1.0  1.0  0.9


 


Urine Protein   


 


Urine Glucose (UA)   


 


Urine Blood   














  11/14/18 11/26/18 11/26/18





  08:20 16:30 19:14


 


Sodium   


 


Potassium   


 


Creatinine  1.1  2.2 H 


 


Urine Protein    Negative


 


Urine Glucose (UA)    2+ H


 


Urine Blood    Negative














  11/27/18





  05:12


 


Sodium  136


 


Potassium  3.6


 


Creatinine  1.7 H


 


Urine Protein 


 


Urine Glucose (UA) 


 


Urine Blood 














Imaging





- Results


Chest X-ray: Report Reviewed





Problem List





- Problems


(1) MARLEEN (acute kidney injury)


Code(s): N17.9 - ACUTE KIDNEY FAILURE, UNSPECIFIED   





(2) Atrial flutter


Code(s): I48.92 - UNSPECIFIED ATRIAL FLUTTER   





(3) Diabetes


Code(s): E11.9 - TYPE 2 DIABETES MELLITUS WITHOUT COMPLICATIONS   


Qualifiers: 


   Diabetes mellitus type: type 2 





(4) HLD (hyperlipidemia)


Code(s): E78.5 - HYPERLIPIDEMIA, UNSPECIFIED   





Assessment/Plan





 Current Medications











Generic Name Dose Route Start Last Admin





  Trade Name Freq  PRN Reason Stop Dose Admin


 


Acetaminophen  650 mg  11/26/18 21:44  





  Tylenol -  PO   





  Q6H PRN   





  FEVER   





     





     





     


 


Amlodipine Besylate  10 mg  11/27/18 10:00  





  Norvasc -  PO   





  DAILY [facility]   





     





     





     





     


 


Apixaban  2.5 mg  11/27/18 22:00  





  Eliquis -  PO   





  BID JULIEN   





     





     





     





     


 


Atorvastatin Calcium  20 mg  11/26/18 22:00  11/26/18 22:38





  Lipitor -  PO   20 mg





  [facility] JULIEN   Administration





     





     





     





     


 


Brimonidine Tartrate  1 drop  11/27/18 10:00  





  Alphagan 0.2% -  OU   





  DAILY JULIEN   





     





     





     





     


 


Sodium Chloride  1,000 mls @ 75 mls/hr  11/26/18 20:15  11/26/18 20:52





  Normal Saline -  IV   75 mls/hr





  ASDIR JULIEN   Administration





     





     





     





     


 


Insulin Detemir  40 units  11/26/18 22:00  11/26/18 22:37





  Levemir Vial  SQ   40 unit





  [facility] JULIEN   Administration





     





     





     





     


 


Losartan Potassium  25 mg  11/27/18 13:00  





  Cozaar -  PO   





  DAILY JULIEN   





     





     





     





     


 


Memantine  10 mg  11/27/18 08:00  





  Namenda -  PO   





  DAILY@0800 JULIEN   





     





     





     





     


 


Methimazole  10 mg  11/27/18 10:00  





  Tapazole -  PO   





  DAILY [facility]   





     





     





     





     


 


Metoprolol Succinate  100 mg  11/27/18 10:00  





  Toprol Xl -  PO   





  DAILY [facility]   





     





     





     





     


 


Pantoprazole Sodium  40 mg  11/27/18 10:00  





  Protonix Iv  IVPUSH   





  DAILY [facility]   





     





     





     





     


 


Timolol Maleate  1 drop  11/27/18 10:00  





  Timoptic 0.5%  OU   





  DAILY [facility]   





     





     





     





     








Impression


1. MARLEEN


2. nausea


3. DM


4. HTN


5. obesity


6. asthma


7. hypothyroidism


8. hyponatremia








Plan


- renal function is improving


- sodium improving


- cont fluids


- repeat labs in am


- check ua


- kidney unremarkable on ultrasound


- will follow


Dr Carson

## 2018-11-27 NOTE — CON.CARD
Consult


Consult Specialty:: Cardiology


Referred by:: Dr. Manriquez


Reason for Consultation:: Cardiac evaluation





- History of Present Illness


Chief Complaint: Chest pain and abdominal discomfort


History of Present Illness: 








Patient is an 83 year old female of  descent with underlying history of 

type 2 DM, HTN, hypercholesterolemia, bronchial asthma/COPD, atrial fibrillation

/flutter on NOAC and hyperthyroidism on treatment who presents with left upper 

abdominal discomfort and sharp chest discomfort across her sternum. She also 

complained of nausea. She suffers from constipation. She denies shortness of 

breath or palpitations. She denies paroxysmal nocturnal dyspnea or orthopnea. 

She denies fever or chills. She denies headache or lightheadedness. She denies 

diarrhea or vomiting.





- History Source


History Provided By: Patient, Medical Record


Limitations to Obtaining History: Language Barrier





- Past Medical History


Cardio/Vascular: Yes: AFIB, HTN, Hyperlipdemia


Pulmonary: Yes: COPD


Endocrine: Yes: Diabetes Mellitus, Hyperthyroidism





- Alcohol/Substance Use


Hx Alcohol Use: No





- Smoking History


Smoking history: Unknown if ever smoked


Have you smoked in the past 12 months: No


Aproximately how many cigarettes per day: 0


If you are a former smoker, when did you quit?: 60years ago





Home Medications





- Allergies


Allergies/Adverse Reactions: 


 Allergies











Allergy/AdvReac Type Severity Reaction Status Date / Time


 


No Known Allergies Allergy   Verified 11/26/18 20:09














- Home Medications


Home Medications: 


Ambulatory Orders





Albuterol 0.083% Nebulizer Sol [Ventolin 0.083% Nebulizer Soln -] 1 amp NEB Q4H 

PRN #30 amp MDD 4 06/08/18 


Memantine HCl [Namenda -] 10 mg PO DAILY@0800 #30 tablet MDD 1 06/08/18 


Brimonidine Tartrate/Timolol [Combigan 0.2%-0.5% Eye Drops] 5 ml OP DAILY 08/14/ 18 


Exenatide Microspheres [Bydureon Pen] 2 mg SQ WEEKLY 08/14/18 


Methimazole [Tapazole -] 10 mg PO DAILY MDD 2 08/14/18 


Pantoprazole Sodium [Protonix] 40 mg PO DAILY 08/14/18 


Atorvastatin Ca [Lipitor] 20 mg PO HS #30 tablet 08/20/18 


Furosemide [Lasix -] 40 mg PO DAILY #30 tablet 08/20/18 


Rivaroxaban [Xarelto -] 20 mg PO DAILY@2000 #30 tablet 08/20/18 


Amlodipine Besylate [Norvasc -] 10 mg PO DAILY 11/26/18 


Aspirin Coated [Ecotrin -] 81 mg PO DAILY 11/26/18 


Digoxin 125 mcg PO DAILY 11/26/18 


Insulin Glargine,Hum.rec.anlog [Lantus Solostar PEN (NF)] 40 units SQ HS 11/26/ 18 


Metolazone [Zaroxolyn -] 2.5 mg PO DAILY 11/26/18 


Metoprolol Succinate [Toprol XL -] 100 mg PO DAILY 11/26/18 


Omeprazole Magnesium [Acid Reducer] 40 mg PO DAILY 11/26/18 


Ondansetron HCl [Zofran] 4 mg PO TID PRN 11/26/18 


Tramadol HCl/Acetaminophen [Tramadol-Acetaminophn 37.5-325] 1 each PO QID PRN 11 /26/18 











Review of Systems





- Review of Systems


Constitutional: denies: Chills, Fever


Cardiovascular: reports: Chest Pain.  denies: Palpitations, Shortness of Breath


Respiratory: denies: Cough, Hemoptysis, Orthopnea, PND, SOB, SOB on Exertion


Gastrointestinal: reports: Abdominal Pain, Constipation, Nausea.  denies: 

Diarrhea, Melena, Rectal Bleeding, Vomiting


Genitourinary: denies: Dysuria, Hematuria


Neurological: denies: Dizziness, Headache


Vital Signs: 


 Vital Signs











Temperature  97.4 F L  11/27/18 12:01


 


Pulse Rate  69   11/27/18 12:01


 


Respiratory Rate  18   11/27/18 12:01


 


Blood Pressure  115/56 L  11/27/18 12:01


 


O2 Sat by Pulse Oximetry (%)  100   11/27/18 07:45











HENT: Yes: Atraumatic


Neck: Yes: Supple


Respiratory: Yes: CTA Bilaterally


Gastrointestinal: Yes: Normal Bowel Sounds, Soft.  No: Tenderness


Cardiovascular: Yes: Regular Rate and Rhythm


JVD: No


Carotid Bruit: No


PMI: Non-Displaced


Heart Sounds: Yes: S1, S2.  No: Gallop


Edema: No





- Other Data


Labs, Other Data: 


 CBC, BMP





 11/27/18 05:12 





 11/27/18 05:12 





 INR, PTT











INR  1.08  (0.83-1.09)   11/26/18  16:30    








 Troponin, BNP











  11/26/18 11/26/18 11/27/18





  16:30 18:57 05:12


 


Troponin I  0.19 H  0.20 H  0.15 H








  Laboratory Results - last 24 hr











  11/26/18 11/26/18 11/26/18





  16:30 16:30 16:30


 


WBC  8.9  


 


RBC  5.14  


 


Hgb  14.1  


 


Hct  41.1  


 


MCV  79.9 L  


 


MCH  27.4  


 


MCHC  34.2  


 


RDW  16.2 H  


 


Plt Count  249  D  


 


MPV  8.6  


 


Absolute Neuts (auto)  5.8  


 


Neutrophils %  65.5  


 


Lymphocytes %  23.5  


 


Monocytes %  8.4  


 


Eosinophils %  1.6  


 


Basophils %  1.0  


 


Nucleated RBC %  0  


 


PT with INR   12.70 


 


INR   1.08 


 


Sodium    130 L


 


Potassium    3.8


 


Chloride    89 L


 


Carbon Dioxide    31


 


Anion Gap    11


 


BUN    57 H


 


Creatinine    2.2 H


 


Creat Clearance w eGFR    21.32


 


POC Glucometer   


 


Random Glucose    221 H


 


Lactic Acid   


 


Calcium    9.4


 


Magnesium    2.3


 


Total Bilirubin    1.3 H


 


AST    37


 


ALT    26


 


Alkaline Phosphatase    133 H


 


Creatine Kinase   


 


Troponin I    0.19 H


 


Total Protein    6.6


 


Albumin    3.2 L


 


Lipase    125


 


Urine Color   


 


Urine Appearance   


 


Urine pH   


 


Ur Specific Gravity   


 


Urine Protein   


 


Urine Glucose (UA)   


 


Urine Ketones   


 


Urine Blood   


 


Urine Nitrite   


 


Urine Bilirubin   


 


Urine Urobilinogen   


 


Ur Leukocyte Esterase   


 


Urine WBC (Auto)   


 


Urine RBC (Auto)   


 


Ur Epithelial Cells   


 


Urine Bacteria   


 


Hyaline Casts   


 


Urine Yeast   














  11/26/18 11/26/18 11/26/18





  16:37 18:57 18:57


 


WBC   


 


RBC   


 


Hgb   


 


Hct   


 


MCV   


 


MCH   


 


MCHC   


 


RDW   


 


Plt Count   


 


MPV   


 


Absolute Neuts (auto)   


 


Neutrophils %   


 


Lymphocytes %   


 


Monocytes %   


 


Eosinophils %   


 


Basophils %   


 


Nucleated RBC %   


 


PT with INR   


 


INR   


 


Sodium   


 


Potassium   


 


Chloride   


 


Carbon Dioxide   


 


Anion Gap   


 


BUN   


 


Creatinine   


 


Creat Clearance w eGFR   


 


POC Glucometer   


 


Random Glucose   


 


Lactic Acid  2.1 H  2.2 H* 


 


Calcium   


 


Magnesium   


 


Total Bilirubin   


 


AST   


 


ALT   


 


Alkaline Phosphatase   


 


Creatine Kinase    56


 


Troponin I    0.20 H


 


Total Protein   


 


Albumin   


 


Lipase   


 


Urine Color   


 


Urine Appearance   


 


Urine pH   


 


Ur Specific Gravity   


 


Urine Protein   


 


Urine Glucose (UA)   


 


Urine Ketones   


 


Urine Blood   


 


Urine Nitrite   


 


Urine Bilirubin   


 


Urine Urobilinogen   


 


Ur Leukocyte Esterase   


 


Urine WBC (Auto)   


 


Urine RBC (Auto)   


 


Ur Epithelial Cells   


 


Urine Bacteria   


 


Hyaline Casts   


 


Urine Yeast   














  11/26/18 11/27/18 11/27/18





  19:14 05:12 05:12


 


WBC   8.2 


 


RBC   4.87 


 


Hgb   12.6 


 


Hct   39.9 


 


MCV   82.1 


 


MCH   26.0 


 


MCHC   31.6 L 


 


RDW   16.6 H 


 


Plt Count   215 


 


MPV   7.7  D 


 


Absolute Neuts (auto)   4.3 


 


Neutrophils %   52.2  D 


 


Lymphocytes %   33.0  D 


 


Monocytes %   9.1 


 


Eosinophils %   4.8 H D 


 


Basophils %   0.9 


 


Nucleated RBC %   0 


 


PT with INR   


 


INR   


 


Sodium    136


 


Potassium    3.6


 


Chloride    95 L


 


Carbon Dioxide    32


 


Anion Gap    8


 


BUN    48 H


 


Creatinine    1.7 H


 


Creat Clearance w eGFR    28.70


 


POC Glucometer   


 


Random Glucose    61 L


 


Lactic Acid   


 


Calcium    8.9


 


Magnesium   


 


Total Bilirubin    1.0


 


AST    24


 


ALT    22


 


Alkaline Phosphatase    124 H


 


Creatine Kinase    56


 


Troponin I    0.15 H


 


Total Protein    6.1 L


 


Albumin    3.0 L


 


Lipase   


 


Urine Color  Ltyellow  


 


Urine Appearance  Clear  


 


Urine pH  7.0  D  


 


Ur Specific Gravity  1.009 L  


 


Urine Protein  Negative  


 


Urine Glucose (UA)  2+ H  


 


Urine Ketones  Negative  


 


Urine Blood  Negative  


 


Urine Nitrite  Negative  


 


Urine Bilirubin  Negative  


 


Urine Urobilinogen  Negative  


 


Ur Leukocyte Esterase  Trace  


 


Urine WBC (Auto)  15  


 


Urine RBC (Auto)  6  


 


Ur Epithelial Cells  Rare  


 


Urine Bacteria  Rare  


 


Hyaline Casts  1  


 


Urine Yeast  Many  




















Atrial flutter with variable block, possible inferior and anteroseptal infarct


Echo: Pending





Imaging





- Results


Chest X-ray: Report Reviewed (Large heart)


Ultrasound: Report Reviewed (renal calculus, cholelithiasis with distension)


EKG: Report Reviewed (Atrial flutter)





Problem List





- Problems


(1) HTN (hypertension)


Code(s): I10 - ESSENTIAL (PRIMARY) HYPERTENSION   





(2) CKD (chronic kidney disease)


Code(s): N18.9 - CHRONIC KIDNEY DISEASE, UNSPECIFIED   





(3) Chest pain


Code(s): R07.9 - CHEST PAIN, UNSPECIFIED   





(4) LUQ abdominal pain


Code(s): R10.12 - LEFT UPPER QUADRANT PAIN   





(5) Atrial flutter


Code(s): I48.92 - UNSPECIFIED ATRIAL FLUTTER   





(6) Diabetes


Code(s): E11.9 - TYPE 2 DIABETES MELLITUS WITHOUT COMPLICATIONS   


Qualifiers: 


   Diabetes mellitus type: type 2 





(7) HLD (hyperlipidemia)


Code(s): E78.5 - HYPERLIPIDEMIA, UNSPECIFIED   





(8) Hyperthyroidism


Code(s): E05.90 - THYROTOXICOSIS, UNSP WITHOUT THYROTOXIC CRISIS OR STORM   





Assessment/Plan





1. Chest pain syndrome with mild elevation of troponin likely due to demand 

ischemia, rule out CAD


2. Atrial fibrillation/flutter [id number] score likely 6 on NOAC


3. CKD


4. HTN


5. Type 2 DM


6. Hypercholesterolemia


7. History of hyperthyroidism on therapy


8. Cholelithiasis


9. Nephrolithiasis





PLAN:


1. Trend troponin


2. Change Xarelto to Eliquis 2.5 mg BID (age > 80 and Cr >1.5)


3. Discontinue ASA


4. Discontinue Digoxin


5. Continue Metoprolol ER


6. Add ACEI or ARB


7. May continue  Amlodiopine


8. Continue Statin and check lipid panel


9. Echocardiography to assess LV/RV and valvular function


10. Further cardiac work up to be followed and will need to assess duration of 

AF and to determine whether to just rate control or to employ rhythm control





Further plans are to follow


Moiz Wilson MD

## 2018-11-28 LAB
ALBUMIN SERPL-MCNC: 2.9 G/DL (ref 3.4–5)
ALP SERPL-CCNC: 114 U/L (ref 45–117)
ALT SERPL-CCNC: 20 U/L (ref 13–61)
ANION GAP SERPL CALC-SCNC: 6 MMOL/L (ref 8–16)
AST SERPL-CCNC: 24 U/L (ref 15–37)
BILIRUB SERPL-MCNC: 1.1 MG/DL (ref 0.2–1)
BUN SERPL-MCNC: 23 MG/DL (ref 7–18)
CALCIUM SERPL-MCNC: 9 MG/DL (ref 8.5–10.1)
CHLORIDE SERPL-SCNC: 101 MMOL/L (ref 98–107)
CO2 SERPL-SCNC: 31 MMOL/L (ref 21–32)
CREAT SERPL-MCNC: 0.9 MG/DL (ref 0.55–1.3)
GLUCOSE SERPL-MCNC: 91 MG/DL (ref 74–106)
POTASSIUM SERPLBLD-SCNC: 3.3 MMOL/L (ref 3.5–5.1)
PROT SERPL-MCNC: 5.9 G/DL (ref 6.4–8.2)
SODIUM SERPL-SCNC: 138 MMOL/L (ref 136–145)

## 2018-11-28 RX ADMIN — POLYETHYLENE GLYCOL 3350 SCH GM: 17 POWDER, FOR SOLUTION ORAL at 18:39

## 2018-11-28 RX ADMIN — METHIMAZOLE SCH MG: 10 TABLET ORAL at 10:17

## 2018-11-28 RX ADMIN — BRIMONIDINE TARTRATE SCH DROP: 2 SOLUTION/ DROPS OPHTHALMIC at 12:19

## 2018-11-28 RX ADMIN — TIMOLOL MALEATE SCH DROP: 5 SOLUTION OPHTHALMIC at 12:19

## 2018-11-28 RX ADMIN — MEMANTINE SCH MG: 10 TABLET ORAL at 10:17

## 2018-11-28 RX ADMIN — PANTOPRAZOLE SODIUM SCH MG: 40 INJECTION, POWDER, FOR SOLUTION INTRAVENOUS at 10:18

## 2018-11-28 RX ADMIN — ATORVASTATIN CALCIUM SCH MG: 20 TABLET, FILM COATED ORAL at 21:09

## 2018-11-28 RX ADMIN — LOSARTAN POTASSIUM SCH MG: 50 TABLET, FILM COATED ORAL at 10:17

## 2018-11-28 RX ADMIN — APIXABAN SCH MG: 2.5 TABLET, FILM COATED ORAL at 21:09

## 2018-11-28 RX ADMIN — APIXABAN SCH MG: 2.5 TABLET, FILM COATED ORAL at 10:17

## 2018-11-28 NOTE — ECHO
______________________________________________________________________________



Name: ISADORA CHRISTOPHER                                 Exam:Adult Echocardiogram

MRN: [id number]         Study Date: 2018 10:52 AM

Age: 83 yrs

______________________________________________________________________________



Reason For Study: Chest pain

Height: 63 in        Weight: 200 lb        BSA: 1.9 m2



______________________________________________________________________________



MMode/2D Measurements & Calculations

IVSd: 1.2 cm                                        Ao root diam: 2.7 cm

LVIDd: 4.0 cm                                       LA dimension: 2.9 cm

LVIDs: 2.9 cm

LVPWd: 1.4 cm



_____________________________________________________

EDV(Teich): 70.8 ml                                 LAV (MOD-bp): 79.4 ml

ESV(Teich): 33.1 ml



Doppler Measurements & Calculations

MV E max benjamin: 116.0 cm/sec                                TR max benajmin: 269.8 cm/sec

MV dec time: 0.20 sec                                     TR max P.2 mmHg



___________________________________________________________

Med Peak E' Benjamin: 5.2 cm/sec                               PI Vmax: 160.2 cm/sec

Med E/e': 22.2

Lat Peak E' Benjamin: 9.1 cm/sec

Lat E/e': 12.7





______________________________________________________________________________

Procedure

A two-dimensional transthoracic echocardiogram with color flow and Doppler was performed.

Left Ventricle

The left ventricular size, thickness and function are normal. The left ventricular ejection fraction 
is

normal. The left ventricular wall motion is normal.

Right Ventricle

The right ventricle is normal in size and function.

Atria

Normal left and right atrial size and function.

Mitral Valve

There is mild mitral valve thickening. There is no mitral valve stenosis. There is mild mitral regurg
itation.

Tricuspid Valve

There is mild tricuspid valve thickening. There is no tricuspid stenosis. There is severe tricuspid

regurgitation. Right ventricular systolic pressure is elevated at 30-40mmHg.

Aortic Valve

The aortic valve is normal in structure and function. No hemodynamically significant valvular aortic 
stenosis.

Mild aortic regurgitation.

Pulmonic Valve

The pulmonic valve is not well visualized. There is no pulmonic valvular stenosis. Moderate pulmonic 
valvular

regurgitation.

Great Vessels

The aortic root is normal size.

Pericardium/Pleura

There is no pericardial effusion.



______________________________________________________________________________



Interpretation Summary

The left ventricular size, thickness and function are normal

The left ventricular ejection fraction is normal.

There is mild mitral regurgitation.

Right ventricular systolic pressure is elevated at 30-40mmHg.

Mild aortic regurgitation.

The left ventricular wall motion is normal.

Moderate pulmonic valvular regurgitation.

There is severe tricuspid regurgitation.







MD Joseluis Freeman 2018 03:41 PM

## 2018-11-28 NOTE — PN
Progress Note, Physician


History of Present Illness: 





Pt seen and examined at bedside. She is awake and alert. She denies abdominal 

pain. 





- Current Medication List


Current Medications: 


Active Medications





Acetaminophen (Tylenol -)  650 mg PO Q6H PRN


   PRN Reason: FEVER


Apixaban (Eliquis -)  2.5 mg PO BID [facility]


   Last Admin: 11/28/18 10:17 Dose:  2.5 mg


Atorvastatin Calcium (Lipitor -)  20 mg PO HS [facility]


   Last Admin: 11/27/18 23:04 Dose:  20 mg


Brimonidine Tartrate (Alphagan 0.2% -)  1 drop OU DAILY [facility]


   Last Admin: 11/27/18 15:44 Dose:  Not Given


Dextrose/Sodium Chloride (D5-1/2ns -)  1,000 mls @ 75 mls/hr IV ASDIR [facility]


   Last Admin: 11/27/18 21:04 Dose:  75 mls/hr


Losartan Potassium (Cozaar -)  25 mg PO DAILY [facility]


   Last Admin: 11/28/18 10:17 Dose:  25 mg


Memantine (Namenda -)  10 mg PO DAILY@0800 [facility]


   Last Admin: 11/28/18 10:17 Dose:  10 mg


Methimazole (Tapazole -)  10 mg PO DAILY [facility]


   Last Admin: 11/28/18 10:17 Dose:  10 mg


Metoprolol Succinate (Toprol Xl -)  100 mg PO DAILY [facility]


   Last Admin: 11/28/18 10:20 Dose:  Not Given


Pantoprazole Sodium (Protonix Iv)  40 mg IVPUSH DAILY [facility]


   Last Admin: 11/28/18 10:18 Dose:  40 mg


Timolol Maleate (Timoptic 0.5%)  1 drop OU DAILY [facility]


   Last Admin: 11/27/18 15:44 Dose:  Not Given











- Objective


Vital Signs: 


 Vital Signs











Temperature  98 F   11/28/18 10:00


 


Pulse Rate  58 L  11/28/18 10:00


 


Respiratory Rate  18   11/28/18 10:00


 


Blood Pressure  104/58 L  11/28/18 10:00


 


O2 Sat by Pulse Oximetry (%)  100   11/27/18 20:53











Constitutional: Yes: Calm


Eyes: Yes: Conjunctiva Clear


HENT: Yes: Atraumatic


Cardiovascular: Yes: S1, S2


Respiratory: Yes: CTA Bilaterally


Gastrointestinal: Yes: Normal Bowel Sounds, Soft, Abdomen, Obese


Genitourinary: Yes: WNL


Musculoskeletal: Yes: WNL


Edema: No


Neurological: Yes: Oriented


Labs: 


 CBC, BMP





 11/27/18 05:12 





 11/28/18 06:20 





 INR, PTT











INR  1.08  (0.83-1.09)   11/26/18  16:30    














Problem List





- Problems


(1) MARLEEN (acute kidney injury)


Code(s): N17.9 - ACUTE KIDNEY FAILURE, UNSPECIFIED   





(2) Atrial flutter


Code(s): I48.92 - UNSPECIFIED ATRIAL FLUTTER   





(3) Diabetes


Code(s): E11.9 - TYPE 2 DIABETES MELLITUS WITHOUT COMPLICATIONS   


Qualifiers: 


   Diabetes mellitus type: type 2 





(4) HLD (hyperlipidemia)


Code(s): E78.5 - HYPERLIPIDEMIA, UNSPECIFIED   





Assessment/Plan


 Current Medications











Generic Name Dose Route Start Last Admin





  Trade Name Freq  PRN Reason Stop Dose Admin


 


Acetaminophen  650 mg  11/26/18 21:44  





  Tylenol -  PO   





  Q6H PRN   





  FEVER   





     





     





     


 


Apixaban  2.5 mg  11/27/18 22:00  11/28/18 10:17





  Eliquis -  PO   2.5 mg





  BID JULIEN   Administration





     





     





     





     


 


Atorvastatin Calcium  20 mg  11/26/18 22:00  11/27/18 23:04





  Lipitor -  PO   20 mg





  HS JULIEN   Administration





     





     





     





     


 


Brimonidine Tartrate  1 drop  11/27/18 10:00  11/27/18 15:44





  Alphagan 0.2% -  OU   Not Given





  DAILY JULIEN   





     





     





     





     


 


Dextrose/Sodium Chloride  1,000 mls @ 75 mls/hr  11/27/18 19:00  11/27/18 21:04





  D5-1/2ns -  IV   75 mls/hr





  ASDIR JULIEN   Administration





     





     





     





     


 


Losartan Potassium  25 mg  11/27/18 13:00  11/28/18 10:17





  Cozaar -  PO   25 mg





  DAILY JULIEN   Administration





     





     





     





     


 


Memantine  10 mg  11/27/18 08:00  11/28/18 10:17





  Namenda -  PO   10 mg





  DAILY@0800 JULIEN   Administration





     





     





     





     


 


Methimazole  10 mg  11/27/18 10:00  11/28/18 10:17





  Tapazole -  PO   10 mg





  DAILY JULIEN   Administration





     





     





     





     


 


Metoprolol Succinate  100 mg  11/27/18 10:00  11/28/18 10:20





  Toprol Xl -  PO   Not Given





  DAILY JULIEN   





     





     





     





     


 


Pantoprazole Sodium  40 mg  11/27/18 10:00  11/28/18 10:18





  Protonix Iv  IVPUSH   40 mg





  DAILY JULIEN   Administration





     





     





     





     


 


Timolol Maleate  1 drop  11/27/18 10:00  11/27/18 15:44





  Timoptic 0.5%  OU   Not Given





  DAILY JULIEN   





     





     





     





     











Impression


1. MARLEEN


2. nausea


3. DM


4. HTN


5. obesity


6. asthma


7. hypothyroidism


8. hyponatremia








Plan


- renal function is improved


- will decrease rate of fluids


- will replace potassium


- check mag level


- likely marleen from dehydration


- check ua


- kidney unremarkable on ultrasound


- will follow


Dr Carson

## 2018-11-28 NOTE — PN
Progress Note, Physician


History of Present Illness: 





STILL HAS EPIGASTRIC/LUQ DISCOMFORT





- Current Medication List


Current Medications: 


Active Medications





Acetaminophen (Tylenol -)  650 mg PO Q6H PRN


   PRN Reason: FEVER


Apixaban (Eliquis -)  2.5 mg PO BID [facility]


   Last Admin: 11/28/18 10:17 Dose:  2.5 mg


Atorvastatin Calcium (Lipitor -)  20 mg PO HS [facility]


   Last Admin: 11/27/18 23:04 Dose:  20 mg


Brimonidine Tartrate (Alphagan 0.2% -)  1 drop OU DAILY [facility]


   Last Admin: 11/28/18 12:19 Dose:  1 drop


Potassium Chloride/Dextrose/Sod Cl (D5-1/2ns+20 Meq Kcl -)  20 meq in 1,000 mls 

@ 70 mls/hr IV ASDIR [facility]


   Last Admin: 11/28/18 12:20 Dose:  70 mls/hr


Losartan Potassium (Cozaar -)  25 mg PO DAILY [facility]


   Last Admin: 11/28/18 10:17 Dose:  25 mg


Memantine (Namenda -)  10 mg PO DAILY@0800 [facility]


   Last Admin: 11/28/18 10:17 Dose:  10 mg


Methimazole (Tapazole -)  10 mg PO DAILY [facility]


   Last Admin: 11/28/18 10:17 Dose:  10 mg


Metoprolol Succinate (Toprol Xl -)  100 mg PO DAILY [facility]


   Last Admin: 11/28/18 10:20 Dose:  Not Given


Pantoprazole Sodium (Protonix Iv)  40 mg IVPUSH DAILY [facility]


   Last Admin: 11/28/18 10:18 Dose:  40 mg


Timolol Maleate (Timoptic 0.5%)  1 drop OU DAILY [facility]


   Last Admin: 11/28/18 12:19 Dose:  1 drop











- Objective


Vital Signs: 


 Vital Signs











Temperature  98.2 F   11/28/18 14:05


 


Pulse Rate  74   11/28/18 14:05


 


Respiratory Rate  18   11/28/18 14:05


 


Blood Pressure  124/54 L  11/28/18 14:05


 


O2 Sat by Pulse Oximetry (%)  100   11/28/18 09:00











Constitutional: Yes: No Distress


HENT: Yes: Atraumatic


Neck: Yes: Supple


Cardiovascular: Yes: Regular Rate and Rhythm


Respiratory: Yes: CTA Bilaterally


Gastrointestinal: Yes: Normal Bowel Sounds


Extremities: Yes: WNL


Edema: No


Peripheral Pulses WNL: Yes


Neurological: Yes: Alert, Oriented


Labs: 


 CBC, BMP





 11/27/18 05:12 





 11/28/18 06:20 





 INR, PTT











INR  1.08  (0.83-1.09)   11/26/18  16:30    














Problem List





- Problems


(1) Chest pain


Assessment/Plan: 


tele monitoring


fu cardiac enzymes...trending down





Code(s): R07.9 - CHEST PAIN, UNSPECIFIED   


Qualifiers: 


   Chest pain type: unspecified   Qualified Code(s): R07.9 - Chest pain, 

unspecified   





(2) Diabetes


Assessment/Plan: 


insulin


bgms...monitor


Code(s): E11.9 - TYPE 2 DIABETES MELLITUS WITHOUT COMPLICATIONS   


Qualifiers: 


   Diabetes mellitus type: type 2 





(3) Epigastric pain


Assessment/Plan: 


STILL HAS ...MILD


Code(s): R10.13 - EPIGASTRIC PAIN   





(4) HLD (hyperlipidemia)


Assessment/Plan: 


on meds


Code(s): E78.5 - HYPERLIPIDEMIA, UNSPECIFIED   


Qualifiers: 


   Hyperlipidemia type: pure hypercholesterolemia   Qualified Code(s): E78.00 - 

Pure hypercholesterolemia, unspecified; E78.0 - Pure hypercholesterolemia   





(5) Hyperthyroidism


Code(s): E05.90 - THYROTOXICOSIS, UNSP WITHOUT THYROTOXIC CRISIS OR STORM   





(6) Obesity


Code(s): E66.9 - OBESITY, UNSPECIFIED   





(7) MARLEEN (acute kidney injury)


Assessment/Plan: 


improving with hydration


Code(s): N17.9 - ACUTE KIDNEY FAILURE, UNSPECIFIED

## 2018-11-28 NOTE — PN
Progress Note, Physician


History of Present Illness: 


No further chest or epigastric discomfort.








- Current Medication List


Current Medications: 


Active Medications





Acetaminophen (Tylenol -)  650 mg PO Q6H PRN


   PRN Reason: FEVER


Apixaban (Eliquis -)  2.5 mg PO BID [facility]


   Last Admin: 11/28/18 10:17 Dose:  2.5 mg


Atorvastatin Calcium (Lipitor -)  20 mg PO HS [facility]


   Last Admin: 11/27/18 23:04 Dose:  20 mg


Brimonidine Tartrate (Alphagan 0.2% -)  1 drop OU DAILY [facility]


   Last Admin: 11/27/18 15:44 Dose:  Not Given


Potassium Chloride/Dextrose/Sod Cl (D5-1/2ns+20 Meq Kcl -)  20 meq in 1,000 mls 

@ 70 mls/hr IV ASDIR [facility]


Losartan Potassium (Cozaar -)  25 mg PO DAILY [facility]


   Last Admin: 11/28/18 10:17 Dose:  25 mg


Memantine (Namenda -)  10 mg PO DAILY@0800 [facility]


   Last Admin: 11/28/18 10:17 Dose:  10 mg


Methimazole (Tapazole -)  10 mg PO DAILY [facility]


   Last Admin: 11/28/18 10:17 Dose:  10 mg


Metoprolol Succinate (Toprol Xl -)  100 mg PO DAILY [facility]


   Last Admin: 11/28/18 10:20 Dose:  Not Given


Pantoprazole Sodium (Protonix Iv)  40 mg IVPUSH DAILY [facility]


   Last Admin: 11/28/18 10:18 Dose:  40 mg


Timolol Maleate (Timoptic 0.5%)  1 drop OU DAILY [facility]


   Last Admin: 11/27/18 15:44 Dose:  Not Given











- Objective


Vital Signs: 


 Vital Signs











Temperature  98 F   11/28/18 10:00


 


Pulse Rate  58 L  11/28/18 10:00


 


Respiratory Rate  18   11/28/18 10:00


 


Blood Pressure  104/58 L  11/28/18 10:00


 


O2 Sat by Pulse Oximetry (%)  100   11/27/18 20:53











Constitutional: Yes: No Distress, Calm


Neck: Yes: Supple


Cardiovascular: Yes: Pulse Irregular


Respiratory: Yes: Regular, Diminished, On Nasal O2


Gastrointestinal: Yes: Soft, Abdomen, Obese, Hypoactive Bowel Sounds


Edema: No


Labs: 


 CBC, BMP





 11/27/18 05:12 





 11/28/18 06:20 





 INR, PTT











INR  1.08  (0.83-1.09)   11/26/18  16:30    














- ....Imaging


EKG: Report Reviewed (Tele: Rate-controlled afib)





Problem List





- Problems


(1) Demand ischemia


Code(s): I24.8 - OTHER FORMS OF ACUTE ISCHEMIC HEART DISEASE   





(2) MARLEEN (acute kidney injury)


Code(s): N17.9 - ACUTE KIDNEY FAILURE, UNSPECIFIED   





(3) CKD (chronic kidney disease)


Code(s): N18.9 - CHRONIC KIDNEY DISEASE, UNSPECIFIED   


Qualifiers: 


   Chronic kidney disease stage: stage 2 (mild)   Qualified Code(s): N18.2 - 

Chronic kidney disease, stage 2 (mild)   





(4) Chest pain


Code(s): R07.9 - CHEST PAIN, UNSPECIFIED   


Qualifiers: 


   Chest pain type: unspecified   Qualified Code(s): R07.9 - Chest pain, 

unspecified   





(5) HTN (hypertension)


Code(s): I10 - ESSENTIAL (PRIMARY) HYPERTENSION   


Qualifiers: 


   Hypertension type: essential hypertension   Qualified Code(s): I10 - 

Essential (primary) hypertension   





(6) Atrial flutter


Code(s): I48.92 - UNSPECIFIED ATRIAL FLUTTER   


Qualifiers: 


   Atrial flutter type: unspecified   Qualified Code(s): I48.92 - Unspecified 

atrial flutter   





(7) HLD (hyperlipidemia)


Code(s): E78.5 - HYPERLIPIDEMIA, UNSPECIFIED   


Qualifiers: 


   Hyperlipidemia type: pure hypercholesterolemia   Qualified Code(s): E78.00 - 

Pure hypercholesterolemia, unspecified; E78.0 - Pure hypercholesterolemia   





(8) Hyperthyroidism


Code(s): E05.90 - THYROTOXICOSIS, UNSP WITHOUT THYROTOXIC CRISIS OR STORM   





(9) Obesity


Code(s): E66.9 - OBESITY, UNSPECIFIED   





(10) Epigastric pain


Code(s): R10.13 - EPIGASTRIC PAIN   





Assessment/Plan





1. Chest pain syndrome with mild elevation of troponin referable to demand 

ischemia, rule out CAD


2. Atrial fibrillation/flutter [id number] score likely 6 on NOAC


3. Epigastric / RUQ abdominal pain - imaging findings of cholelithiasis and 

distended GB r/o biliary etiology vs primary gastric etiology such as peptic 

ulcer disease


4. Acute on CKD (pre-renal) improved


4. HTN


5. Type 2 DM


6. Hypercholesterolemia


7. Hyperthyroidism 


8. Cholelithiasis


9. Nephrolithiasis





PLAN:


1. Troponins have peaked


2. Continue Eliquis 2.5 mg BID (age > 80 and Cr >1.5)


3. Continue Lipitor 20 qhs, losartan 25 qd, Toprol  qd


4. Echocardiography to assess LV/RV and valvular function


5. Ischemia evaluation as outpatient and will need to assess duration of AF and 

determine rate control vs rhythm control strategy


6. Clear liquid, PPI, HIDA scan per GI recs, d/c telemetry

## 2018-11-28 NOTE — PN
Progress Note, Physician


History of Present Illness: 


daughter at the bedside - states her mother is feeling much better 


Patient denies nausea/ vomiting or abdominal pain 





- Current Medication List


Current Medications: 


Active Medications





Acetaminophen (Tylenol -)  650 mg PO Q6H PRN


   PRN Reason: FEVER


Apixaban (Eliquis -)  2.5 mg PO BID [facility]


   Last Admin: 11/28/18 10:17 Dose:  2.5 mg


Atorvastatin Calcium (Lipitor -)  20 mg PO HS [facility]


   Last Admin: 11/27/18 23:04 Dose:  20 mg


Brimonidine Tartrate (Alphagan 0.2% -)  1 drop OU DAILY [facility]


   Last Admin: 11/28/18 12:19 Dose:  1 drop


Potassium Chloride/Dextrose/Sod Cl (D5-1/2ns+20 Meq Kcl -)  20 meq in 1,000 mls 

@ 70 mls/hr IV ASDIR [facility]


   Last Admin: 11/28/18 12:20 Dose:  70 mls/hr


Losartan Potassium (Cozaar -)  25 mg PO DAILY [facility]


   Last Admin: 11/28/18 10:17 Dose:  25 mg


Memantine (Namenda -)  10 mg PO DAILY@0800 [facility]


   Last Admin: 11/28/18 10:17 Dose:  10 mg


Methimazole (Tapazole -)  10 mg PO DAILY [facility]


   Last Admin: 11/28/18 10:17 Dose:  10 mg


Metoprolol Succinate (Toprol Xl -)  100 mg PO DAILY [facility]


   Last Admin: 11/28/18 10:20 Dose:  Not Given


Pantoprazole Sodium (Protonix Iv)  40 mg IVPUSH DAILY [facility]


   Last Admin: 11/28/18 10:18 Dose:  40 mg


Timolol Maleate (Timoptic 0.5%)  1 drop OU DAILY [facility]


   Last Admin: 11/28/18 12:19 Dose:  1 drop











- Objective


Vital Signs: 


 Vital Signs











Temperature  98 F   11/28/18 10:00


 


Pulse Rate  58 L  11/28/18 10:00


 


Respiratory Rate  18   11/28/18 10:00


 


Blood Pressure  104/58 L  11/28/18 10:00


 


O2 Sat by Pulse Oximetry (%)  100   11/27/18 20:53











Constitutional: Yes: Well Nourished, No Distress, Calm


Eyes: Yes: WNL


HENT: Yes: WNL


Neck: Yes: WNL


Cardiovascular: Yes: WNL, Pulse Irregular


Respiratory: Yes: WNL, Regular, CTA Bilaterally


Gastrointestinal: Yes: WNL, Normal Bowel Sounds, Soft


Edema: No


Labs: 


 CBC, BMP





 11/27/18 05:12 





 11/28/18 06:20 





 INR, PTT











INR  1.08  (0.83-1.09)   11/26/18  16:30    














Problem List





- Problems


(1) Abdominal pain


Assessment/Plan: 


- HIDA scan results are pending ; if normal advance diet as tolerated 


- c/w PPI 


- management of her cardiopulmonary process as per primary medical team / 

cardiology 


Code(s): R10.9 - UNSPECIFIED ABDOMINAL PAIN   





(2) Vomiting


Code(s): R11.10 - VOMITING, UNSPECIFIED

## 2018-11-29 RX ADMIN — SODIUM CHLORIDE SCH: 9 INJECTION, SOLUTION INTRAVENOUS at 20:00

## 2018-11-29 RX ADMIN — POLYETHYLENE GLYCOL 3350 SCH: 17 POWDER, FOR SOLUTION ORAL at 09:33

## 2018-11-29 NOTE — CONSULT
Consult


Consult Specialty:: General [place] 


Reason for Consultation:: acute cholecystitis





- History of Present Illness


Chief Complaint: chronic absominal pain


History of Present Illness: 





82 yo female PMH type 2 DM, HTN, hypercholesterolemia, bronchial asthma/COPD, 

atrial fibrillation/flutter on NOAC and hyperthyroidism on treatment who 

presents with left upper abdominal discomfort and sharp chest discomfort across 

her sternum. She also complained of nausea. She suffers from constipation. She 

denies shortness of breath or palpitations. She denies paroxysmal nocturnal 

dyspnea or orthopnea. She denies fever or chills. She denies headache or 

lightheadedness. She denies diarrhea or vomiting. HIDA shows non-visulaization 

of the GB. We were asked to assess. 











- History Source


History Provided By: Patient, Family Member, Medical Record


Limitations to Obtaining History: Language Barrier





- Past Medical History


Cardio/Vascular: Yes: AFIB, HTN, Hyperlipdemia


Pulmonary: Yes: COPD


Infectious Disease: No: AIDS, C-Diff, Herpes Zoster, HIV, MRSA, STD's, 

Tuberculosis, VREF, Other


Psych: No: Addictions, Anxiety, Bipolar, Depression, Panic, Psychosis, 

Schizophrenia, Other


Musculoskeletal: No: Bursitis, Chronic low back pain, Hemiparesis, Hemiplegia, 

Osteoarthritis, Paraplegia, Other


Rheumatology: No: Fibromyalgia, Gout, Lupus, Rheumatoid Arthritis, Sarcoidosis, 

Vasculitis, Other


ENT: No: Allergic Rhinitis, Sinusitis, Other


Endocrine: Yes: Diabetes Mellitus, Hyperthyroidism.  No: Remberto's Disease, 

Cushing's Disease, Diabetes Insipidus, Hyperparathyroidism, Hypothyroidism, 

Osteopenia, SIADH, Other


Dermatology: No: Basal Cell, Cellulitis, Eczema, Melanoma, Psoriasis, Squamous 

Cell, Other





- Alcohol/Substance Use


Hx Alcohol Use: No





- Smoking History


Smoking history: Unknown if ever smoked


Have you smoked in the past 12 months: No


Aproximately how many cigarettes per day: 0


If you are a former smoker, when did you quit?: 60years ago





- Social History


Usual Living Arrangement: Other





Home Medications





- Allergies


Allergies/Adverse Reactions: 


 Allergies











Allergy/AdvReac Type Severity Reaction Status Date / Time


 


No Known Allergies Allergy   Verified 11/26/18 20:09














- Home Medications


Home Medications: 


Ambulatory Orders





Albuterol 0.083% Nebulizer Sol [Ventolin 0.083% Nebulizer Soln -] 1 amp NEB Q4H 

PRN #30 amp MDD 4 06/08/18 


Memantine HCl [Namenda -] 10 mg PO DAILY@0800 #30 tablet MDD 1 06/08/18 


Brimonidine Tartrate/Timolol [Combigan 0.2%-0.5% Eye Drops] 5 ml OP DAILY 08/14/ 18 


Exenatide Microspheres [Bydureon Pen] 2 mg SQ WEEKLY 08/14/18 


Methimazole [Tapazole -] 10 mg PO DAILY [facility] 2 08/14/18 


Pantoprazole Sodium [Protonix] 40 mg PO DAILY 08/14/18 


Atorvastatin Ca [Lipitor] 20 mg PO HS #30 tablet 08/20/18 


Furosemide [Lasix -] 40 mg PO DAILY #30 tablet 08/20/18 


Insulin Glargine,Hum.rec.anlog [Lantus Solostar PEN -] 40 units SQ HS 11/26/18 


Metoprolol Succinate [Toprol XL -] 100 mg PO DAILY 11/26/18 


Omeprazole Magnesium [Acid Reducer] 40 mg PO DAILY 11/26/18 


Ondansetron HCl [Zofran] 4 mg PO TID PRN 11/26/18 


Apixaban [Eliquis -] 2.5 mg PO BID #20 tablet 11/28/18 


Losartan Potassium [Cozaar -] 25 mg PO DAILY #30 tablet 11/28/18 











Review of Systems





- Review of Systems


Constitutional: reports: Fever, Malaise, Weakness.  denies: Chills


HENT: denies: Difficult Swallowing, Throat Pain


Neck: denies: Pain on Movement, Swollen Glands


Cardiovascular: reports: Chest Pain.  denies: Palpitations


Respiratory: denies: Cough, SOB


Gastrointestinal: reports: Abdominal Pain, Bloating, Constipation.  denies: 

Rectal Bleeding, Vomiting, Vomiting Blood


Genitourinary: denies: Discharge, Dysuria


Breasts: reports: No Symptoms Reported.  denies: Pain


Musculoskeletal: denies: Muscle Cramps, Muscle Weakness


Endocrine: denies: Unexplained Weight Gain, Unexplained Weight Loss


Hematology/Lymphatic: denies: Easily Bruised, Excessive Bleeding


Psychiatric: denies: Anxiety, Depression





Physical Exam


Vital Signs: 


 Vital Signs











Temperature  98.4 F   11/29/18 18:00


 


Pulse Rate  83   11/29/18 18:00


 


Respiratory Rate  20   11/29/18 18:00


 


Blood Pressure  129/66   11/29/18 18:00


 


O2 Sat by Pulse Oximetry (%)  94 L  11/29/18 09:00











Constitutional: Yes: No Distress, Calm, Obese


Eyes: Yes: Conjunctiva Clear, EOM Intact


HENT: Yes: Atraumatic, Normocephalic


Neck: Yes: Supple, Trachea Midline


Cardiovascular: Yes: Regular Rate and Rhythm, S1, S2


Respiratory: Yes: Regular, CTA Bilaterally


Gastrointestinal: Yes: Normal Bowel Sounds, Soft, Abdomen, Obese, Tenderness (

RUQ to deep palpation, -rebound or guarding, -murphys)


...Rectal Exam: Yes: Deferred


Renal/: No: CVA Tenderness - Left, CVA Tenderness - Right


Musculoskeletal: No: Muscle Pain, Muscle Weakness


Extremities: No: Cool, Cyanosis


Edema: No


Labs: 


 CBC, BMP





 11/27/18 05:12 





 11/28/18 06:20 











Imaging





- Results


Cat Scan: Report Reviewed, Image Reviewed


Other: Report Reviewed, Image Reviewed (non-visulaization of GB, acute 

cholecystitis)





Problem List





- Problems


(1) Abnormal biliary HIDA scan


Assessment/Plan: 


82 yo female with MMP and recent demand ishemia vs CAD with image cofirmed 

acute cholecystitis,  poor surgical candidate this was discussed with the 

patient and family. will proceed with alternative less invasive management 

options. 





npo and ivf hydration


continue IV antibiotics 


hold eloquis for cholecystostomy tube on Monday 12/3 (LAST DOSE WAS 11/28 @10pm 

per Pharmacy)


IR for cholecystostomy on 12/3





Thank you for the opportunity to participate in the care of this patient.








Code(s): R94.8 - ABNORMAL RESULTS OF FUNCTION STUDIES OF ORGANS AND SYSTEMS   





(2) Chest pain


Code(s): R07.9 - CHEST PAIN, UNSPECIFIED   


Qualifiers: 


   Chest pain type: chest pain due to myocardial ischemia   Ischemic chest pain 

type: stable angina pectoris   Qualified Code(s): I20.8 - Other forms of angina 

pectoris   





(3) Demand ischemia


Code(s): I24.8 - OTHER FORMS OF ACUTE ISCHEMIC HEART DISEASE   





(4) Acute on chronic systolic (congestive) heart failure


Code(s): I50.23 - ACUTE ON CHRONIC SYSTOLIC (CONGESTIVE) HEART FAILURE   





(5) Atrial flutter


Code(s): I48.92 - UNSPECIFIED ATRIAL FLUTTER   


Qualifiers: 


   Atrial flutter type: unspecified   Qualified Code(s): I48.92 - Unspecified 

atrial flutter   





(6) Obesity


Code(s): E66.9 - OBESITY, UNSPECIFIED

## 2018-11-29 NOTE — PN
Progress Note, Physician





- Current Medication List


Current Medications: 


Active Medications





Metronidazole (Flagyl 500mg Premixed Ivpb -)  500 mg in 100 mls @ 100 mls/hr 

IVPB Q8H-IV [facility]


   Last Admin: 11/29/18 17:47 Dose:  100 mls/hr


Levofloxacin (Levaquin 500 Mg Premixed Ivpb -)  500 mg in 100 mls @ 100 mls/hr 

IVPB DAILY [facility]


   Last Admin: 11/29/18 09:32 Dose:  100 mls/hr


Sodium Chloride (Normal Saline -)  1,000 mls @ 75 mls/hr IV ASDIR [facility]


Morphine Sulfate (Morphine Sulfate)  1 mg IVPUSH Q4H PRN


   PRN Reason: PAIN LEVEL 4 - 6


Polyethylene Glycol (Miralax (For Daily Use) -)  17 gm PO DAILY [facility]


   Last Admin: 11/29/18 09:33 Dose:  Not Given











- Objective


Vital Signs: 


 Vital Signs











Temperature  97.9 F   11/29/18 13:50


 


Pulse Rate  84   11/29/18 13:50


 


Respiratory Rate  16   11/29/18 13:50


 


Blood Pressure  139/69   11/29/18 13:50


 


O2 Sat by Pulse Oximetry (%)  94 L  11/29/18 09:00











Constitutional: Yes: No Distress


HENT: Yes: Atraumatic


Neck: Yes: Supple


Cardiovascular: Yes: Regular Rate and Rhythm


Respiratory: Yes: CTA Bilaterally


Gastrointestinal: Yes: Normal Bowel Sounds, Tenderness (epigastric)


Extremities: Yes: WNL


Neurological: Yes: Alert, Oriented


Labs: 


 CBC, BMP





 11/27/18 05:12 





 11/28/18 06:20 





 INR, PTT











INR  1.08  (0.83-1.09)   11/26/18  16:30    














Problem List





- Problems


(1) Chest pain


Assessment/Plan: 


pt has epigastric discomfort





Code(s): R07.9 - CHEST PAIN, UNSPECIFIED   


Qualifiers: 


   Chest pain type: chest pain due to myocardial ischemia   Ischemic chest pain 

type: stable angina pectoris 





(2) Diabetes


Assessment/Plan: 





bgms...monitor


Code(s): E11.9 - TYPE 2 DIABETES MELLITUS WITHOUT COMPLICATIONS   


Qualifiers: 


   Diabetes mellitus type: type 2 





(3) Epigastric pain


Assessment/Plan: 


still has mild epigastric pain


Code(s): R10.13 - EPIGASTRIC PAIN   





(4) HLD (hyperlipidemia)


Assessment/Plan: 


on meds


Code(s): E78.5 - HYPERLIPIDEMIA, UNSPECIFIED   


Qualifiers: 


   Hyperlipidemia type: pure hypercholesterolemia   Qualified Code(s): E78.00 - 

Pure hypercholesterolemia, unspecified; E78.0 - Pure hypercholesterolemia   





(5) Hyperthyroidism


Code(s): E05.90 - THYROTOXICOSIS, UNSP WITHOUT THYROTOXIC CRISIS OR STORM   





(6) Obesity


Code(s): E66.9 - OBESITY, UNSPECIFIED   





(7) MARLEEN (acute kidney injury)


Assessment/Plan: 


improving with hydration


Code(s): N17.9 - ACUTE KIDNEY FAILURE, UNSPECIFIED

## 2018-11-30 RX ADMIN — POLYETHYLENE GLYCOL 3350 SCH: 17 POWDER, FOR SOLUTION ORAL at 11:05

## 2018-11-30 RX ADMIN — SODIUM CHLORIDE SCH MLS/HR: 9 INJECTION, SOLUTION INTRAVENOUS at 18:58

## 2018-11-30 RX ADMIN — PIPERACILLIN SODIUM,TAZOBACTAM SODIUM SCH MLS/HR: 3; .375 INJECTION, POWDER, FOR SOLUTION INTRAVENOUS at 14:22

## 2018-11-30 RX ADMIN — PIPERACILLIN SODIUM,TAZOBACTAM SODIUM SCH MLS/HR: 3; .375 INJECTION, POWDER, FOR SOLUTION INTRAVENOUS at 21:28

## 2018-11-30 RX ADMIN — SODIUM CHLORIDE SCH MLS/HR: 9 INJECTION, SOLUTION INTRAVENOUS at 00:56

## 2018-11-30 NOTE — CON.ID
Consult


Consult Specialty:: infectious diseases





- Past Medical History


Cardio/Vascular: Yes: AFIB, HTN, Hyperlipdemia


Pulmonary: Yes: COPD


Infectious Disease: No: AIDS, C-Diff, Herpes Zoster, HIV, MRSA, STD's, 

Tuberculosis, VREF, Other


Psych: No: Addictions, Anxiety, Bipolar, Depression, Panic, Psychosis, 

Schizophrenia, Other


Musculoskeletal: No: Bursitis, Chronic low back pain, Hemiparesis, Hemiplegia, 

Osteoarthritis, Paraplegia, Other


Rheumatology: No: Fibromyalgia, Gout, Lupus, Rheumatoid Arthritis, Sarcoidosis, 

Vasculitis, Other


ENT: No: Allergic Rhinitis, Sinusitis, Other


Endocrine: Yes: Diabetes Mellitus, Hyperthyroidism.  No: Remberto's Disease, 

Cushing's Disease, Diabetes Insipidus, Hyperparathyroidism, Hypothyroidism, 

Osteopenia, SIADH, Other


Dermatology: No: Basal Cell, Cellulitis, Eczema, Melanoma, Psoriasis, Squamous 

Cell, Other





- Alcohol/Substance Use


Hx Alcohol Use: No





- Smoking History


Smoking history: Unknown if ever smoked


Have you smoked in the past 12 months: No


Aproximately how many cigarettes per day: 0


If you are a former smoker, when did you quit?: 60years ago





- Social History


Usual Living Arrangement: Other





Home Medications





- Allergies


Allergies/Adverse Reactions: 


 Allergies











Allergy/AdvReac Type Severity Reaction Status Date / Time


 


No Known Allergies Allergy   Verified 11/26/18 20:09














- Home Medications


Home Medications: 


Ambulatory Orders





Albuterol 0.083% Nebulizer Sol [Ventolin 0.083% Nebulizer Soln -] 1 amp NEB Q4H 

PRN #30 amp MDD 4 06/08/18 


Memantine HCl [Namenda -] 10 mg PO DAILY@0800 #30 tablet MDD 1 06/08/18 


Brimonidine Tartrate/Timolol [Combigan 0.2%-0.5% Eye Drops] 5 ml OP DAILY 08/14/ 18 


Exenatide Microspheres [Bydureon Pen] 2 mg SQ WEEKLY 08/14/18 


Methimazole [Tapazole -] 10 mg PO DAILY MDD 2 08/14/18 


Pantoprazole Sodium [Protonix] 40 mg PO DAILY 08/14/18 


Atorvastatin Ca [Lipitor] 20 mg PO HS #30 tablet 08/20/18 


Furosemide [Lasix -] 40 mg PO DAILY #30 tablet 08/20/18 


Insulin Glargine,Hum.rec.anlog [Lantus Solostar PEN -] 40 units SQ HS 11/26/18 


Metoprolol Succinate [Toprol XL -] 100 mg PO DAILY 11/26/18 


Omeprazole Magnesium [Acid Reducer] 40 mg PO DAILY 11/26/18 


Ondansetron HCl [Zofran] 4 mg PO TID PRN 11/26/18 


Apixaban [Eliquis -] 2.5 mg PO BID #20 tablet 11/28/18 


Losartan Potassium [Cozaar -] 25 mg PO DAILY #30 tablet 11/28/18 











Physical Exam


Vital Signs: 


 Vital Signs











Temperature  98.3 F   11/30/18 06:00


 


Pulse Rate  90   11/30/18 06:00


 


Respiratory Rate  20   11/30/18 06:00


 


Blood Pressure  129/74   11/30/18 06:00


 


O2 Sat by Pulse Oximetry (%)  94 L  11/29/18 09:00











Labs: 


 CBC, BMP





 11/27/18 05:12 





 11/28/18 06:20

## 2018-11-30 NOTE — PN
Progress Note, Physician


Chief Complaint: 





abdominal pain


History of Present Illness: 





82 yo female PMH type 2 DM, HTN, hypercholesterolemia, bronchial asthma/COPD, 

atrial fibrillation/flutter on NOAC and hyperthyroidism on treatment who 

presents with left upper abdominal discomfort and sharp chest discomfort across 

her sternum. 











- Current Medication List


Current Medications: 


Active Medications





Sodium Chloride (Normal Saline -)  1,000 mls @ 75 mls/hr IV [facility]


   Last Admin: 11/30/18 00:56 Dose:  75 mls/hr


Piperacillin Sod/Tazobactam (Sod 3.375 gm/ Dextrose)  50 mls @ 100 mls/hr IVPB 

Q8H-IV [facility]; Protocol


Morphine Sulfate (Morphine Sulfate)  1 mg IVPUSH Q4H PRN


   PRN Reason: PAIN LEVEL 4 - 6


Polyethylene Glycol (Miralax (For Daily Use) -)  17 gm PO DAILY [facility]


   Last Admin: 11/30/18 11:05 Dose:  Not Given











- Objective


Vital Signs: 


 Vital Signs











Temperature  98.3 F   11/30/18 06:00


 


Pulse Rate  90   11/30/18 06:00


 


Respiratory Rate  20   11/30/18 06:00


 


Blood Pressure  129/74   11/30/18 06:00


 


O2 Sat by Pulse Oximetry (%)  94 L  11/29/18 09:00








 Vital Signs











 Period  Temp  Pulse  Resp  BP Sys/[place]  Pulse Ox


 


 Last 24 Hr  98.3 F-98.4 F  83-90  20-20  129-129/66-74  











Constitutional: Yes: Well Nourished, No Distress, Calm


Eyes: Yes: Conjunctiva Clear, EOM Intact


HENT: Yes: Atraumatic, Normocephalic


Neck: Yes: Supple, Trachea Midline


Cardiovascular: Yes: Regular Rate and Rhythm, S1, S2


Respiratory: Yes: Regular, CTA Bilaterally


Gastrointestinal: Yes: Normal Bowel Sounds, Soft, Tenderness (RUQ), Tenderness, 

Epigastrium


...Rectal Exam: Yes: Deferred


Genitourinary: No: CVA Tenderness - Left, CVA Tenderness - Right


Musculoskeletal: No: Muscle Pain, Muscle Weakness


Extremities: No: Cool, Cyanosis


Edema: No


Peripheral Pulses WNL: Yes


Peripheral Pulses: Left Radial: 2+, Right Radial: 2+, Left Doralis Pedis: 2+, 

Right Dorsalis Pedis: 2+


Integumentary: No: Jaundice, Pressure Ulcer, Rash


Neurological: Yes: Alert, Oriented


Psychiatric: Yes: Alert, Oriented


Labs: 


 CBC, BMP





 11/27/18 05:12 





 11/28/18 06:20 





 INR, PTT











INR  1.08  (0.83-1.09)   11/26/18  16:30    














Problem List





- Problems


(1) Abnormal biliary HIDA scan


Assessment/Plan: 


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 

acute cholecystitis,  poor surgical candidate this was discussed with the 

patient and family. will proceed with alternative less invasive management 

options. 





npo and ivf hydration


continue IV antibiotics 


hold eloquis for cholecystostomy tube on Monday 12/3 (LAST DOSE WAS 11/28 @10pm 

per Pharmacy)


IR for cholecystostomy on 12/3





Thank you for the opportunity to participate in the care of this patient.








Code(s): R94.8 - ABNORMAL RESULTS OF FUNCTION STUDIES OF ORGANS AND SYSTEMS   





(2) Chest pain


Code(s): R07.9 - CHEST PAIN, UNSPECIFIED   


Qualifiers: 


   Chest pain type: chest pain due to myocardial ischemia   Ischemic chest pain 

type: stable angina pectoris   Qualified Code(s): I20.8 - Other forms of angina 

pectoris   





(3) Demand ischemia


Code(s): I24.8 - OTHER FORMS OF ACUTE ISCHEMIC HEART DISEASE   





(4) Acute on chronic systolic (congestive) heart failure


Code(s): I50.23 - ACUTE ON CHRONIC SYSTOLIC (CONGESTIVE) HEART FAILURE   





(5) Atrial flutter


Code(s): I48.92 - UNSPECIFIED ATRIAL FLUTTER   


Qualifiers: 


   Atrial flutter type: unspecified   Qualified Code(s): I48.92 - Unspecified 

atrial flutter   





(6) Obesity


Code(s): E66.9 - OBESITY, UNSPECIFIED

## 2018-11-30 NOTE — PN
Progress Note, Physician





- Current Medication List


Current Medications: 


Active Medications





Sodium Chloride (Normal Saline -)  1,000 mls @ 75 mls/hr IV ASDIR JULIEN


   Last Admin: 11/30/18 00:56 Dose:  75 mls/hr


Piperacillin Sod/Tazobactam (Sod 3.375 gm/ Dextrose)  50 mls @ 100 mls/hr IVPB 

Q8H-IV JULIEN; Protocol


   Last Admin: 11/30/18 14:22 Dose:  100 mls/hr


Morphine Sulfate (Morphine Sulfate)  1 mg IVPUSH Q4H PRN


   PRN Reason: PAIN LEVEL 4 - 6


Polyethylene Glycol (Miralax (For Daily Use) -)  17 gm PO DAILY JULIEN


   Last Admin: 11/30/18 11:05 Dose:  Not Given











- Objective


Vital Signs: 


 Vital Signs











Temperature  98.4 F   11/30/18 14:34


 


Pulse Rate  105 H  11/30/18 14:34


 


Respiratory Rate  20   11/30/18 14:34


 


Blood Pressure  116/73   11/30/18 14:34


 


O2 Sat by Pulse Oximetry (%)  94 L  11/29/18 09:00











Constitutional: Yes: No Distress


HENT: Yes: Atraumatic


Neck: Yes: Supple


Cardiovascular: Yes: Regular Rate and Rhythm


Respiratory: Yes: CTA Bilaterally


Gastrointestinal: Yes: Normal Bowel Sounds


Extremities: Yes: WNL


Edema: No


Neurological: Yes: Alert, Oriented


Labs: 


 CBC, BMP





 11/27/18 05:12 





 11/28/18 06:20 





 INR, PTT











INR  1.08  (0.83-1.09)   11/26/18  16:30    














Problem List





- Problems


(1) Chest pain


Assessment/Plan: 


pt has epigastric discomfort





Code(s): R07.9 - CHEST PAIN, UNSPECIFIED   


Qualifiers: 


   Chest pain type: chest pain due to myocardial ischemia   Ischemic chest pain 

type: stable angina pectoris 





(2) Diabetes


Assessment/Plan: 





bgms...monitor


Code(s): E11.9 - TYPE 2 DIABETES MELLITUS WITHOUT COMPLICATIONS   


Qualifiers: 


   Diabetes mellitus type: type 2 





(3) Epigastric pain


Assessment/Plan: 


still has mild epigastric pain


Code(s): R10.13 - EPIGASTRIC PAIN   





(4) HLD (hyperlipidemia)


Assessment/Plan: 


on meds


Code(s): E78.5 - HYPERLIPIDEMIA, UNSPECIFIED   


Qualifiers: 


   Hyperlipidemia type: pure hypercholesterolemia   Qualified Code(s): E78.00 - 

Pure hypercholesterolemia, unspecified; E78.0 - Pure hypercholesterolemia   





(5) Hyperthyroidism


Code(s): E05.90 - THYROTOXICOSIS, UNSP WITHOUT THYROTOXIC CRISIS OR STORM   





(6) Obesity


Code(s): E66.9 - OBESITY, UNSPECIFIED   





(7) MARLEEN (acute kidney injury)


Assessment/Plan: 


improving with hydration


Code(s): N17.9 - ACUTE KIDNEY FAILURE, UNSPECIFIED

## 2018-12-01 LAB
APPEARANCE UR: (no result)
BACTERIA #/AREA URNS HPF: (no result) /HPF
BILIRUB UR STRIP.AUTO-MCNC: NEGATIVE MG/DL
COLOR UR: YELLOW
EPITH CASTS URNS QL MICRO: (no result) /HPF
HYALINE CASTS URNS QL MICRO: 1 /LPF
KETONES UR QL STRIP: NEGATIVE
LEUKOCYTE ESTERASE UR QL STRIP.AUTO: (no result)
NITRITE UR QL STRIP: NEGATIVE
PH UR: 5 [PH] (ref 5–8)
PROT UR QL STRIP: (no result)
PROT UR QL STRIP: NEGATIVE
SP GR UR: 1.02 (ref 1.01–1.03)
URATE CRY URNS QL MICRO: (no result) /HPF
UROBILINOGEN UR STRIP-MCNC: NEGATIVE MG/DL (ref 0.2–1)

## 2018-12-01 RX ADMIN — POLYETHYLENE GLYCOL 3350 SCH GM: 17 POWDER, FOR SOLUTION ORAL at 09:22

## 2018-12-01 RX ADMIN — SODIUM CHLORIDE SCH: 9 INJECTION, SOLUTION INTRAVENOUS at 18:24

## 2018-12-01 RX ADMIN — SODIUM CHLORIDE SCH MLS/HR: 9 INJECTION, SOLUTION INTRAVENOUS at 09:22

## 2018-12-01 RX ADMIN — PIPERACILLIN SODIUM,TAZOBACTAM SODIUM SCH MLS/HR: 3; .375 INJECTION, POWDER, FOR SOLUTION INTRAVENOUS at 18:24

## 2018-12-01 RX ADMIN — PIPERACILLIN SODIUM,TAZOBACTAM SODIUM SCH MLS/HR: 3; .375 INJECTION, POWDER, FOR SOLUTION INTRAVENOUS at 09:25

## 2018-12-01 RX ADMIN — PIPERACILLIN SODIUM,TAZOBACTAM SODIUM SCH MLS/HR: 3; .375 INJECTION, POWDER, FOR SOLUTION INTRAVENOUS at 01:37

## 2018-12-01 NOTE — PN
Progress Note, Physician


Chief Complaint: 





abdominal pain


History of Present Illness: 





82 yo female PMH type 2 DM, HTN, hypercholesterolemia, bronchial asthma/COPD, 

atrial fibrillation/flutter on NOAC and hyperthyroidism on treatment who 

presents with left upper abdominal discomfort and sharp chest discomfort across 

her sternum. 











- Current Medication List


Current Medications: 


Active Medications





Sodium Chloride (Normal Saline -)  1,000 mls @ 75 mls/hr IV ASDIR JULIEN


   Last Admin: 12/01/18 18:24 Dose:  Not Given


Piperacillin Sod/Tazobactam (Sod 3.375 gm/ Dextrose)  50 mls @ 100 mls/hr IVPB 

Q8H-IV JULIEN; Protocol


   Last Admin: 12/01/18 18:24 Dose:  100 mls/hr


Morphine Sulfate (Morphine Sulfate)  1 mg IVPUSH Q4H PRN


   PRN Reason: PAIN LEVEL 4 - 6


Polyethylene Glycol (Miralax (For Daily Use) -)  17 gm PO DAILY JULIEN


   Last Admin: 12/01/18 09:22 Dose:  17 gm











- Objective


Vital Signs: 


 Vital Signs











Temperature  97.8 F   12/01/18 19:00


 


Pulse Rate  88   12/01/18 19:00


 


Respiratory Rate  18   12/01/18 19:00


 


Blood Pressure  104/61   12/01/18 19:00


 


O2 Sat by Pulse Oximetry (%)  95   12/01/18 10:00











Constitutional: Yes: Well Nourished, No Distress, Calm


Eyes: Yes: Conjunctiva Clear, EOM Intact


HENT: Yes: Atraumatic, Normocephalic


Neck: Yes: Supple, Trachea Midline


Cardiovascular: Yes: Regular Rate and Rhythm, S1, S2


Respiratory: Yes: Regular, CTA Bilaterally


Gastrointestinal: Yes: Normal Bowel Sounds, Soft, Abdomen, Obese.  No: 

Tenderness, Epigastrium, Tenderness, Rebound


...Rectal Exam: Yes: Deferred


Genitourinary: No: CVA Tenderness - Left, CVA Tenderness - Right


Musculoskeletal: No: Muscle Pain, Muscle Weakness


Extremities: No: Cool, Cyanosis


Edema: No


Peripheral Pulses WNL: Yes


Peripheral Pulses: Left Radial: 2+, Right Radial: 2+, Left Doralis Pedis: 2+, 

Right Dorsalis Pedis: 2+, Left Femoral: 2+, Right Femoral: 2+


Integumentary: No: Jaundice, Pressure Ulcer, Rash


Wound/Incision: Yes: Clean/Dry


Neurological: Yes: Alert, Oriented


Psychiatric: Yes: Alert, Oriented


Labs: 


 CBC, BMP





 11/27/18 05:12 





 11/28/18 06:20 





 INR, PTT











INR  1.08  (0.83-1.09)   11/26/18  16:30    














Problem List





- Problems


(1) Abnormal biliary HIDA scan


Assessment/Plan: 


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 

acute cholecystitis,  poor surgical candidate this was discussed with the 

patient and family. will proceed with alternative less invasive management 

options. 





npo and ivf hydration


continue IV antibiotics 


hold eloquis for cholecystostomy tube on Monday 12/3 (LAST DOSE WAS 11/28 @10pm 

per Pharmacy)


IR for cholecystostomy on 12/3





Thank you for the opportunity to participate in the care of this patient.








Code(s): R94.8 - ABNORMAL RESULTS OF FUNCTION STUDIES OF ORGANS AND SYSTEMS   





(2) Chest pain


Code(s): R07.9 - CHEST PAIN, UNSPECIFIED   


Qualifiers: 


   Chest pain type: chest pain due to myocardial ischemia   Ischemic chest pain 

type: stable angina pectoris   Qualified Code(s): I20.8 - Other forms of angina 

pectoris   





(3) Demand ischemia


Code(s): I24.8 - OTHER FORMS OF ACUTE ISCHEMIC HEART DISEASE   





(4) Acute on chronic systolic (congestive) heart failure


Code(s): I50.23 - ACUTE ON CHRONIC SYSTOLIC (CONGESTIVE) HEART FAILURE   





(5) Atrial flutter


Code(s): I48.92 - UNSPECIFIED ATRIAL FLUTTER   


Qualifiers: 


   Atrial flutter type: unspecified   Qualified Code(s): I48.92 - Unspecified 

atrial flutter   





(6) Obesity


Code(s): E66.9 - OBESITY, UNSPECIFIED

## 2018-12-01 NOTE — PN
Progress Note, Physician


History of Present Illness: 





No new complaints





- Current Medication List


Current Medications: 


Active Medications





Sodium Chloride (Normal Saline -)  1,000 mls @ 75 mls/hr IV ASDIR JULIEN


   Last Admin: 12/01/18 09:22 Dose:  75 mls/hr


Piperacillin Sod/Tazobactam (Sod 3.375 gm/ Dextrose)  50 mls @ 100 mls/hr IVPB 

Q8H-IV JULIEN; Protocol


   Last Admin: 12/01/18 09:25 Dose:  100 mls/hr


Morphine Sulfate (Morphine Sulfate)  1 mg IVPUSH Q4H PRN


   PRN Reason: PAIN LEVEL 4 - 6


Polyethylene Glycol (Miralax (For Daily Use) -)  17 gm PO DAILY JULIEN


   Last Admin: 12/01/18 09:22 Dose:  17 gm











- Objective


Vital Signs: 


 Vital Signs











Temperature  97.8 F   12/01/18 14:55


 


Pulse Rate  101 H  12/01/18 14:55


 


Respiratory Rate  18   12/01/18 14:55


 


Blood Pressure  118/79   12/01/18 14:55


 


O2 Sat by Pulse Oximetry (%)  95   12/01/18 10:00











Constitutional: Yes: Well Nourished


Neck: Yes: Thyromegaly


Cardiovascular: Yes: WNL, Regular Rate and Rhythm


Respiratory: Yes: WNL, Regular, CTA Bilaterally


Gastrointestinal: Yes: WNL, Normal Bowel Sounds, Soft, Abdomen, Obese


Labs: 


 CBC, BMP





 11/27/18 05:12 





 11/28/18 06:20 





 INR, PTT











INR  1.08  (0.83-1.09)   11/26/18  16:30    














Problem List





- Problems


(1) Abnormal biliary HIDA scan


Assessment/Plan: 


Acute cholecystitis


Pt poor surgical candidate


Pt to undergo cholecystostomy tube placement on 12/3/18 by IR


Cont IV zosyn


Code(s): R94.8 - ABNORMAL RESULTS OF FUNCTION STUDIES OF ORGANS AND SYSTEMS   





(2) MARLEEN (acute kidney injury)


Assessment/Plan: 


Monitor labs


Code(s): N17.9 - ACUTE KIDNEY FAILURE, UNSPECIFIED   





(3) Demand ischemia


Code(s): I24.8 - OTHER FORMS OF ACUTE ISCHEMIC HEART DISEASE   





(4) HTN (hypertension)


Code(s): I10 - ESSENTIAL (PRIMARY) HYPERTENSION   


Qualifiers: 


   Hypertension type: essential hypertension   Qualified Code(s): I10 - 

Essential (primary) hypertension   





(5) Diabetes


Code(s): E11.9 - TYPE 2 DIABETES MELLITUS WITHOUT COMPLICATIONS   


Qualifiers: 


   Diabetes mellitus type: type 2 





(6) HLD (hyperlipidemia)


Code(s): E78.5 - HYPERLIPIDEMIA, UNSPECIFIED   


Qualifiers: 


   Hyperlipidemia type: pure hypercholesterolemia   Qualified Code(s): E78.00 - 

Pure hypercholesterolemia, unspecified; E78.0 - Pure hypercholesterolemia   





(7) Hyperthyroidism


Code(s): E05.90 - THYROTOXICOSIS, UNSP WITHOUT THYROTOXIC CRISIS OR STORM   





(8) Afib


Assessment/Plan: 


Eliquis has been on hold for procedure


Restart eliquis post procedure


Code(s): I48.91 - UNSPECIFIED ATRIAL FIBRILLATION

## 2018-12-02 RX ADMIN — PIPERACILLIN SODIUM,TAZOBACTAM SODIUM SCH MLS/HR: 3; .375 INJECTION, POWDER, FOR SOLUTION INTRAVENOUS at 17:45

## 2018-12-02 RX ADMIN — PIPERACILLIN SODIUM,TAZOBACTAM SODIUM SCH MLS/HR: 3; .375 INJECTION, POWDER, FOR SOLUTION INTRAVENOUS at 01:57

## 2018-12-02 RX ADMIN — DEXTROSE AND SODIUM CHLORIDE SCH MLS/HR: 5; 450 INJECTION, SOLUTION INTRAVENOUS at 17:45

## 2018-12-02 RX ADMIN — PIPERACILLIN SODIUM,TAZOBACTAM SODIUM SCH MLS/HR: 3; .375 INJECTION, POWDER, FOR SOLUTION INTRAVENOUS at 09:38

## 2018-12-02 RX ADMIN — POLYETHYLENE GLYCOL 3350 SCH GM: 17 POWDER, FOR SOLUTION ORAL at 09:38

## 2018-12-02 RX ADMIN — SODIUM CHLORIDE SCH MLS/HR: 9 INJECTION, SOLUTION INTRAVENOUS at 16:18

## 2018-12-02 RX ADMIN — SODIUM CHLORIDE SCH MLS/HR: 9 INJECTION, SOLUTION INTRAVENOUS at 00:56

## 2018-12-02 NOTE — PN
Progress Note, Physician


History of Present Illness: 





No new complaints





- Current Medication List


Current Medications: 


Active Medications





Piperacillin Sod/Tazobactam (Sod 3.375 gm/ Dextrose)  50 mls @ 100 mls/hr IVPB 

Q8H-IV JULIEN; Protocol


   Last Admin: 12/02/18 17:45 Dose:  100 mls/hr


Dextrose/Sodium Chloride (D5-1/2ns -)  1,000 mls @ 42 mls/hr IV ASDIR JULIEN


   Last Admin: 12/02/18 17:45 Dose:  42 mls/hr











- Objective


Vital Signs: 


 Vital Signs











Temperature  97.8 F   12/02/18 18:09


 


Pulse Rate  84   12/02/18 18:09


 


Respiratory Rate  18   12/02/18 18:09


 


Blood Pressure  118/61   12/02/18 18:09


 


O2 Sat by Pulse Oximetry (%)  96   12/02/18 10:00











Constitutional: Yes: Well Nourished


Neck: Yes: Thyromegaly


Cardiovascular: Yes: Pulse Irregular


Respiratory: Yes: WNL, Regular, CTA Bilaterally


Gastrointestinal: Yes: WNL, Normal Bowel Sounds, Soft


Labs: 


 CBC, BMP





 11/27/18 05:12 





 11/28/18 06:20 





 INR, PTT











INR  1.08  (0.83-1.09)   11/26/18  16:30    














Problem List





- Problems


(1) Abnormal biliary HIDA scan


Assessment/Plan: 


Acute cholecystitis


Pt poor surgical candidate


Pt to undergo cholecystostomy tube placement on 12/3/18 by IR


Molly IV zosyn


Restart meds after procedure


Code(s): R94.8 - ABNORMAL RESULTS OF FUNCTION STUDIES OF ORGANS AND SYSTEMS   





(2) MARLEEN (acute kidney injury)


Assessment/Plan: 


Monitor labs


Code(s): N17.9 - ACUTE KIDNEY FAILURE, UNSPECIFIED   





(3) Afib


Assessment/Plan: 


Eliquis has been on hold for procedure


Restart eliquis post procedure


Code(s): I48.91 - UNSPECIFIED ATRIAL FIBRILLATION   





(4) Demand ischemia


Code(s): I24.8 - OTHER FORMS OF ACUTE ISCHEMIC HEART DISEASE   





(5) HTN (hypertension)


Code(s): I10 - ESSENTIAL (PRIMARY) HYPERTENSION   


Qualifiers: 


   Hypertension type: essential hypertension   Qualified Code(s): I10 - 

Essential (primary) hypertension   





(6) Hyperthyroidism


Code(s): E05.90 - THYROTOXICOSIS, UNSP WITHOUT THYROTOXIC CRISIS OR STORM

## 2018-12-02 NOTE — PN
Progress Note, Physician


History of Present Illness: 





stable


no issues


minimal pain


awaiting choley tube





- Current Medication List


Current Medications: 


Active Medications





Sodium Chloride (Normal Saline -)  1,000 mls @ 75 mls/hr IV ASDIR JULIEN


   Last Admin: 12/02/18 00:56 Dose:  75 mls/hr


Piperacillin Sod/Tazobactam (Sod 3.375 gm/ Dextrose)  50 mls @ 100 mls/hr IVPB 

Q8H-IV JULIEN; Protocol


   Last Admin: 12/02/18 09:38 Dose:  100 mls/hr


Morphine Sulfate (Morphine Sulfate)  1 mg IVPUSH Q4H PRN


   PRN Reason: PAIN LEVEL 4 - 6


Polyethylene Glycol (Miralax (For Daily Use) -)  17 gm PO DAILY JULIEN


   Last Admin: 12/02/18 09:38 Dose:  17 gm











- Objective


Vital Signs: 


 Vital Signs











Temperature  98.6 F   12/02/18 09:00


 


Pulse Rate  94 H  12/02/18 09:00


 


Respiratory Rate  18   12/02/18 09:00


 


Blood Pressure  113/61   12/02/18 09:00


 


O2 Sat by Pulse Oximetry (%)  96   12/02/18 10:00











Constitutional: Yes: Calm, Mild Distress, Obese


Cardiovascular: Yes: Regular Rate and Rhythm


Respiratory: Yes: Poor Air Entry (bases)


Gastrointestinal: Yes: Normal Bowel Sounds, Soft


Musculoskeletal: Yes: WNL


Extremities: Yes: WNL


Neurological: Yes: Alert, Oriented


Psychiatric: Yes: Alert, Oriented


Labs: 


 CBC, BMP





 11/27/18 05:12 





 11/28/18 06:20 





 INR, PTT











INR  1.08  (0.83-1.09)   11/26/18  16:30    














Assessment/Plan





Problem List





- Problems


(1) Abnormal biliary HIDA scan


Code(s): R94.8 - ABNORMAL RESULTS OF FUNCTION STUDIES OF ORGANS AND SYSTEMS   





(2) Chest pain


Code(s): R07.9 - CHEST PAIN, UNSPECIFIED   


Qualifiers: 


   Chest pain type: chest pain due to myocardial ischemia   Ischemic chest pain 

type: stable angina pectoris   Qualified Code(s): I20.8 - Other forms of angina 

pectoris   





(3) Demand ischemia


Code(s): I24.8 - OTHER FORMS OF ACUTE ISCHEMIC HEART DISEASE   





(4) Acute on chronic systolic (congestive) heart failure


Code(s): I50.23 - ACUTE ON CHRONIC SYSTOLIC (CONGESTIVE) HEART FAILURE   





(5) Atrial flutter


Code(s): I48.92 - UNSPECIFIED ATRIAL FLUTTER   


Qualifiers: 


   Atrial flutter type: unspecified   Qualified Code(s): I48.92 - Unspecified 

atrial flutter   





(6) Obesity


Code(s): E66.9 - OBESITY, UNSPECIFIED   





plan


continue current mgmt


abx


awaiting for the choley tube


rest as per the team

## 2018-12-03 LAB
ALBUMIN SERPL-MCNC: 2.9 G/DL (ref 3.4–5)
ALP SERPL-CCNC: 105 U/L (ref 45–117)
ALT SERPL-CCNC: 17 U/L (ref 13–61)
ANION GAP SERPL CALC-SCNC: 9 MMOL/L (ref 8–16)
AST SERPL-CCNC: 20 U/L (ref 15–37)
BILIRUB SERPL-MCNC: 1.3 MG/DL (ref 0.2–1)
BUN SERPL-MCNC: 3 MG/DL (ref 7–18)
CALCIUM SERPL-MCNC: 8.9 MG/DL (ref 8.5–10.1)
CHLORIDE SERPL-SCNC: 105 MMOL/L (ref 98–107)
CO2 SERPL-SCNC: 27 MMOL/L (ref 21–32)
CREAT SERPL-MCNC: 0.7 MG/DL (ref 0.55–1.3)
DEPRECATED RDW RBC AUTO: 17.4 % (ref 11.6–15.6)
GLUCOSE SERPL-MCNC: 118 MG/DL (ref 74–106)
HCT VFR BLD CALC: 38.5 % (ref 32.4–45.2)
HGB BLD-MCNC: 12.1 GM/DL (ref 10.7–15.3)
INR BLD: 1.31 (ref 0.83–1.09)
MCH RBC QN AUTO: 26 PG (ref 25.7–33.7)
MCHC RBC AUTO-ENTMCNC: 31.3 G/DL (ref 32–36)
MCV RBC: 83.1 FL (ref 80–96)
PLATELET # BLD AUTO: 177 K/MM3 (ref 134–434)
PMV BLD: 8.1 FL (ref 7.5–11.1)
POTASSIUM SERPLBLD-SCNC: 3.1 MMOL/L (ref 3.5–5.1)
PROT SERPL-MCNC: 5.8 G/DL (ref 6.4–8.2)
PT PNL PPP: 15.5 SEC (ref 9.7–13)
RBC # BLD AUTO: 4.63 M/MM3 (ref 3.6–5.2)
SODIUM SERPL-SCNC: 141 MMOL/L (ref 136–145)
WBC # BLD AUTO: 6.3 K/MM3 (ref 4–10)

## 2018-12-03 RX ADMIN — PIPERACILLIN SODIUM,TAZOBACTAM SODIUM SCH MLS/HR: 3; .375 INJECTION, POWDER, FOR SOLUTION INTRAVENOUS at 02:06

## 2018-12-03 RX ADMIN — PIPERACILLIN SODIUM,TAZOBACTAM SODIUM SCH MLS/HR: 3; .375 INJECTION, POWDER, FOR SOLUTION INTRAVENOUS at 09:04

## 2018-12-03 RX ADMIN — MEMANTINE SCH MG: 10 TABLET ORAL at 21:52

## 2018-12-03 RX ADMIN — ATORVASTATIN CALCIUM SCH MG: 20 TABLET, FILM COATED ORAL at 21:51

## 2018-12-03 RX ADMIN — DEXTROSE AND SODIUM CHLORIDE SCH MLS/HR: 5; 450 INJECTION, SOLUTION INTRAVENOUS at 17:45

## 2018-12-03 RX ADMIN — POTASSIUM CHLORIDE SCH MLS/HR: 7.46 INJECTION, SOLUTION INTRAVENOUS at 10:12

## 2018-12-03 RX ADMIN — METHIMAZOLE SCH MG: 10 TABLET ORAL at 21:51

## 2018-12-03 RX ADMIN — POTASSIUM CHLORIDE SCH MLS/HR: 7.46 INJECTION, SOLUTION INTRAVENOUS at 11:52

## 2018-12-03 RX ADMIN — PIPERACILLIN SODIUM,TAZOBACTAM SODIUM SCH MLS/HR: 3; .375 INJECTION, POWDER, FOR SOLUTION INTRAVENOUS at 17:37

## 2018-12-03 NOTE — PN
Progress Note, Physician


Chief Complaint: 





abdominal pain


History of Present Illness: 





84 yo female PMH type 2 DM, HTN, hypercholesterolemia, bronchial asthma/COPD, 

atrial fibrillation/flutter on NOAC and hyperthyroidism on treatment who 

presents with left upper abdominal discomfort and sharp chest discomfort across 

her sternum. 











- Current Medication List


Current Medications: 


Active Medications





Piperacillin Sod/Tazobactam (Sod 3.375 gm/ Dextrose)  50 mls @ 100 mls/hr IVPB 

Q8H-IV JULIEN; Protocol


   Last Admin: 12/03/18 09:04 Dose:  100 mls/hr


Dextrose/Sodium Chloride (D5-1/2ns -)  1,000 mls @ 42 mls/hr IV ASDIR JULIEN


   Last Admin: 12/02/18 17:45 Dose:  42 mls/hr


Morphine Sulfate (Morphine Sulfate)  1 mg IVPUSH Q4H PRN


   PRN Reason: PAIN LEVEL 4 -10


   Last Admin: 12/03/18 10:08 Dose:  1 mg











- Objective


Vital Signs: 


 Vital Signs











Temperature  97.5 F L  12/03/18 13:42


 


Pulse Rate  89   12/03/18 13:42


 


Respiratory Rate  17   12/03/18 13:42


 


Blood Pressure  123/64   12/03/18 13:42


 


O2 Sat by Pulse Oximetry (%)  97   12/03/18 09:00











Constitutional: Yes: Well Nourished, No Distress


Eyes: Yes: Conjunctiva Clear, EOM Intact


HENT: Yes: Atraumatic, Normocephalic


Neck: Yes: Supple, Trachea Midline


Cardiovascular: Yes: Regular Rate and Rhythm, S1, S2


Respiratory: Yes: Regular, CTA Bilaterally


Gastrointestinal: Yes: Normal Bowel Sounds, Soft, Abdomen, Obese.  No: 

Tenderness, Tenderness, Epigastrium, Tenderness, Rebound


...Rectal Exam: Yes: Deferred


Genitourinary: No: CVA Tenderness - Left, CVA Tenderness - Right


Extremities: No: Cool, Cyanosis


Edema: No


Peripheral Pulses WNL: Yes


Peripheral Pulses: Left Radial: 2+, Right Radial: 2+, Left Doralis Pedis: 2+, 

Right Dorsalis Pedis: 2+, Left Femoral: 2+, Right Femoral: 2+


Integumentary: No: Jaundice, Rash


Neurological: Yes: Alert, Oriented


Psychiatric: Yes: Alert, Oriented


Labs: 


 CBC, BMP





 12/03/18 06:15 





 12/03/18 06:15 





 INR, PTT











INR  1.31  (0.83-1.09)  H  12/03/18  06:15    














Problem List





- Problems


(1) Abnormal biliary HIDA scan


Assessment/Plan: 


84 yo female with MMP and recent demand ishemia vs CAD with image confirmed 

acute cholecystitis,  poor surgical candidate this was discussed with the 

patient and family. will proceed with alternative less invasive management 

options. She has been cleared by cardiology. 





Resume diet 


NPO after midnight 


continue IV antibiotics 


hold eloquis for cholecystostomy tube on We 12/5 (LAST DOSE WAS 11/28 @10pm per 

Pharmacy)


IR for cholecystostomy on 12/5





Thank you for the opportunity to participate in the care of this patient.








Code(s): R94.8 - ABNORMAL RESULTS OF FUNCTION STUDIES OF ORGANS AND SYSTEMS   





(2) Chest pain


Code(s): R07.9 - CHEST PAIN, UNSPECIFIED   


Qualifiers: 


   Chest pain type: chest pain due to myocardial ischemia   Ischemic chest pain 

type: stable angina pectoris   Qualified Code(s): I20.8 - Other forms of angina 

pectoris   





(3) Demand ischemia


Code(s): I24.8 - OTHER FORMS OF ACUTE ISCHEMIC HEART DISEASE   





(4) Acute on chronic systolic (congestive) heart failure


Code(s): I50.23 - ACUTE ON CHRONIC SYSTOLIC (CONGESTIVE) HEART FAILURE   





(5) Atrial flutter


Code(s): I48.92 - UNSPECIFIED ATRIAL FLUTTER   


Qualifiers: 


   Atrial flutter type: unspecified   Qualified Code(s): I48.92 - Unspecified 

atrial flutter   





(6) Obesity


Code(s): E66.9 - OBESITY, UNSPECIFIED

## 2018-12-03 NOTE — PN
Progress Note, Physician


History of Present Illness: 


feeling good no pain





- Current Medication List


Current Medications: 


Active Medications





Piperacillin Sod/Tazobactam (Sod 3.375 gm/ Dextrose)  50 mls @ 100 mls/hr IVPB 

Q8H-IV JULIEN; Protocol


   Last Admin: 12/03/18 09:04 Dose:  100 mls/hr


Dextrose/Sodium Chloride (D5-1/2ns -)  1,000 mls @ 42 mls/hr IV ASDIR JULIEN


   Last Admin: 12/02/18 17:45 Dose:  42 mls/hr


Morphine Sulfate (Morphine Sulfate)  1 mg IVPUSH Q4H PRN


   PRN Reason: PAIN LEVEL 4 -10


   Last Admin: 12/03/18 10:08 Dose:  1 mg











- Objective


Vital Signs: 


 Vital Signs











Temperature  97.5 F L  12/03/18 13:42


 


Pulse Rate  89   12/03/18 13:42


 


Respiratory Rate  17   12/03/18 13:42


 


Blood Pressure  123/64   12/03/18 13:42


 


O2 Sat by Pulse Oximetry (%)  97   12/03/18 09:00











Constitutional: Yes: No Distress


HENT: Yes: Atraumatic


Neck: Yes: Supple


Cardiovascular: Yes: Regular Rate and Rhythm


Respiratory: Yes: CTA Bilaterally


Gastrointestinal: Yes: Normal Bowel Sounds


Extremities: Yes: WNL


Edema: No


Peripheral Pulses WNL: Yes


Neurological: Yes: Alert, Oriented


Labs: 


 CBC, BMP





 12/03/18 06:15 





 12/03/18 06:15 





 INR, PTT











INR  1.31  (0.83-1.09)  H  12/03/18  06:15    














Problem List





- Problems


(1) Chest pain


Assessment/Plan: 


pain resolved





Code(s): R07.9 - CHEST PAIN, UNSPECIFIED   


Qualifiers: 


   Chest pain type: chest pain due to myocardial ischemia   Ischemic chest pain 

type: stable angina pectoris   Qualified Code(s): I20.8 - Other forms of angina 

pectoris   





(2) Diabetes


Assessment/Plan: 





bgms...monitor


on insulin coverage


Code(s): E11.9 - TYPE 2 DIABETES MELLITUS WITHOUT COMPLICATIONS   


Qualifiers: 


   Diabetes mellitus type: type 2 





(3) Epigastric pain


Assessment/Plan: 


not today


Code(s): R10.13 - EPIGASTRIC PAIN   





(4) HLD (hyperlipidemia)


Assessment/Plan: 


on meds


Code(s): E78.5 - HYPERLIPIDEMIA, UNSPECIFIED   


Qualifiers: 


   Hyperlipidemia type: pure hypercholesterolemia   Qualified Code(s): E78.00 - 

Pure hypercholesterolemia, unspecified; E78.0 - Pure hypercholesterolemia   





(5) Hyperthyroidism


Assessment/Plan: 


on meds


stable


Code(s): E05.90 - THYROTOXICOSIS, UNSP WITHOUT THYROTOXIC CRISIS OR STORM   





(6) Obesity


Code(s): E66.9 - OBESITY, UNSPECIFIED   





(7) MARLEEN (acute kidney injury)


Assessment/Plan: 


resolved


Code(s): N17.9 - ACUTE KIDNEY FAILURE, UNSPECIFIED   





(8) Demand ischemia


Code(s): I24.8 - OTHER FORMS OF ACUTE ISCHEMIC HEART DISEASE   





(9) HTN (hypertension)


Code(s): I10 - ESSENTIAL (PRIMARY) HYPERTENSION   


Qualifiers: 


   Hypertension type: essential hypertension   Qualified Code(s): I10 - 

Essential (primary) hypertension   





(10) Hypokalemia


Assessment/Plan: 


will give iv K


Code(s): E87.6 - HYPOKALEMIA

## 2018-12-03 NOTE — PN
Progress Note, Physician


History of Present Illness: 





stable


probably choley tube today





- Current Medication List


Current Medications: 


Active Medications





Piperacillin Sod/Tazobactam (Sod 3.375 gm/ Dextrose)  50 mls @ 100 mls/hr IVPB 

Q8H-IV JULIEN; Protocol


   Last Admin: 12/03/18 09:04 Dose:  100 mls/hr


Dextrose/Sodium Chloride (D5-1/2ns -)  1,000 mls @ 42 mls/hr IV ASDIR JULIEN


   Last Admin: 12/02/18 17:45 Dose:  42 mls/hr


Morphine Sulfate (Morphine Sulfate)  1 mg IVPUSH Q4H PRN


   PRN Reason: PAIN LEVEL 4 -10


   Last Admin: 12/03/18 10:08 Dose:  1 mg











- Objective


Vital Signs: 


 Vital Signs











Temperature  97.5 F L  12/03/18 13:42


 


Pulse Rate  89   12/03/18 13:42


 


Respiratory Rate  17   12/03/18 13:42


 


Blood Pressure  123/64   12/03/18 13:42


 


O2 Sat by Pulse Oximetry (%)  97   12/03/18 09:00











Constitutional: Yes: No Distress, Calm, Obese


Cardiovascular: Yes: S1, S2


Respiratory: Yes: Regular, CTA Bilaterally


Gastrointestinal: Yes: Normal Bowel Sounds, Soft, Tenderness


Musculoskeletal: Yes: WNL


Extremities: Yes: WNL


Neurological: Yes: Alert, Oriented


Psychiatric: Yes: Alert, Oriented


Labs: 


 CBC, BMP





 12/03/18 06:15 





 12/03/18 06:15 





 INR, PTT











INR  1.31  (0.83-1.09)  H  12/03/18  06:15    














Assessment/Plan





Problem List





- Problems


(1) Abnormal biliary HIDA scan


Code(s): R94.8 - ABNORMAL RESULTS OF FUNCTION STUDIES OF ORGANS AND SYSTEMS   





(2) Chest pain


Code(s): R07.9 - CHEST PAIN, UNSPECIFIED   


Qualifiers: 


   Chest pain type: chest pain due to myocardial ischemia   Ischemic chest pain 

type: stable angina pectoris   Qualified Code(s): I20.8 - Other forms of angina 

pectoris   





(3) Demand ischemia


Code(s): I24.8 - OTHER FORMS OF ACUTE ISCHEMIC HEART DISEASE   





(4) Acute on chronic systolic (congestive) heart failure


Code(s): I50.23 - ACUTE ON CHRONIC SYSTOLIC (CONGESTIVE) HEART FAILURE   





(5) Atrial flutter


Code(s): I48.92 - UNSPECIFIED ATRIAL FLUTTER   


Qualifiers: 


   Atrial flutter type: unspecified   Qualified Code(s): I48.92 - Unspecified 

atrial flutter   





(6) Obesity


Code(s): E66.9 - OBESITY, UNSPECIFIED   





plan


continue current mgmt


abx


awaiting for the choley tube


rest as per the team

## 2018-12-03 NOTE — CON.CARD
Consult


Consult Specialty:: Cardiology


Referred by:: Tomi Manriquez


Reason for Consultation:: Aflutter





- History of Present Illness


Chief Complaint: Abd pain


History of Present Illness: 





Patient is an 83 year old female of  descent with underlying history of 

type 2 DM, HTN, hypercholesterolemia, bronchial asthma/COPD, atrial fibrillation

/flutter on NOAC and hyperthyroidism on treatment who presents with left upper 

abdominal discomfort and sharp chest discomfort across her sternum. She also 

complained of nausea. She suffers from constipation. She denies shortness of 

breath or palpitations. She denies paroxysmal nocturnal dyspnea or orthopnea. 

She denies fever or chills. She denies headache or lightheadedness. She denies 

diarrhea or vomiting.








- Past Medical History


Cardio/Vascular: Yes: AFIB, HTN, Hyperlipdemia


Pulmonary: Yes: COPD


Infectious Disease: No: AIDS, C-Diff, Herpes Zoster, HIV, MRSA, STD's, 

Tuberculosis, VREF, Other


Psych: No: Addictions, Anxiety, Bipolar, Depression, Panic, Psychosis, 

Schizophrenia, Other


Musculoskeletal: No: Bursitis, Chronic low back pain, Hemiparesis, Hemiplegia, 

Osteoarthritis, Paraplegia, Other


Rheumatology: No: Fibromyalgia, Gout, Lupus, Rheumatoid Arthritis, Sarcoidosis, 

Vasculitis, Other


ENT: No: Allergic Rhinitis, Sinusitis, Other


Endocrine: Yes: Diabetes Mellitus, Hyperthyroidism.  No: Remberto's Disease, 

Cushing's Disease, Diabetes Insipidus, Hyperparathyroidism, Hypothyroidism, 

Osteopenia, SIADH, Other


Dermatology: No: Basal Cell, Cellulitis, Eczema, Melanoma, Psoriasis, Squamous 

Cell, Other





- Alcohol/Substance Use


Hx Alcohol Use: No





- Smoking History


Smoking history: Unknown if ever smoked


Have you smoked in the past 12 months: No


Aproximately how many cigarettes per day: 0


If you are a former smoker, when did you quit?: 60years ago





- Social History


Usual Living Arrangement: Other





Home Medications





- Allergies


Allergies/Adverse Reactions: 


 Allergies











Allergy/AdvReac Type Severity Reaction Status Date / Time


 


No Known Allergies Allergy   Verified 11/26/18 20:09














- Home Medications


Home Medications: 


Ambulatory Orders





Albuterol 0.083% Nebulizer Sol [Ventolin 0.083% Nebulizer Soln -] 1 amp NEB Q4H 

PRN #30 amp MDD 4 06/08/18 


Memantine HCl [Namenda -] 10 mg PO DAILY@0800 #30 tablet MDD 1 06/08/18 


Brimonidine Tartrate/Timolol [Combigan 0.2%-0.5% Eye Drops] 5 ml OP DAILY 08/14/ 18 


Exenatide Microspheres [Bydureon Pen] 2 mg SQ WEEKLY 08/14/18 


Methimazole [Tapazole -] 10 mg PO DAILY MDD 2 08/14/18 


Pantoprazole Sodium [Protonix] 40 mg PO DAILY 08/14/18 


Atorvastatin Ca [Lipitor] 20 mg PO HS #30 tablet 08/20/18 


Furosemide [Lasix -] 40 mg PO DAILY #30 tablet 08/20/18 


Insulin Glargine,Hum.rec.anlog [Lantus Solostar PEN -] 40 units SQ HS 11/26/18 


Metoprolol Succinate [Toprol XL -] 100 mg PO DAILY 11/26/18 


Omeprazole Magnesium [Acid Reducer] 40 mg PO DAILY 11/26/18 


Ondansetron HCl [Zofran] 4 mg PO TID PRN 11/26/18 


Apixaban [Eliquis -] 2.5 mg PO BID #20 tablet 11/28/18 


Losartan Potassium [Cozaar -] 25 mg PO DAILY #30 tablet 11/28/18 








Vital Signs: 


 Vital Signs











Temperature  97.5 F L  12/03/18 13:42


 


Pulse Rate  89   12/03/18 13:42


 


Respiratory Rate  17   12/03/18 13:42


 


Blood Pressure  123/64   12/03/18 13:42


 


O2 Sat by Pulse Oximetry (%)  97   12/03/18 09:00














- Other Data


Labs, Other Data: 


 CBC, BMP





 12/03/18 06:15 





 12/03/18 06:15 





 INR, PTT











INR  1.31  (0.83-1.09)  H  12/03/18  06:15    














Problem List





- Problems


(1) Demand ischemia


Code(s): I24.8 - OTHER FORMS OF ACUTE ISCHEMIC HEART DISEASE   





(2) MARLEEN (acute kidney injury)


Code(s): N17.9 - ACUTE KIDNEY FAILURE, UNSPECIFIED   





(3) CKD (chronic kidney disease)


Code(s): N18.9 - CHRONIC KIDNEY DISEASE, UNSPECIFIED   


Qualifiers: 


   Chronic kidney disease stage: stage 2 (mild)   Qualified Code(s): N18.2 - 

Chronic kidney disease, stage 2 (mild)   





(4) Chest pain


Code(s): R07.9 - CHEST PAIN, UNSPECIFIED   


Qualifiers: 


   Chest pain type: chest pain due to myocardial ischemia   Ischemic chest pain 

type: stable angina pectoris   Qualified Code(s): I20.8 - Other forms of angina 

pectoris   





(5) HTN (hypertension)


Code(s): I10 - ESSENTIAL (PRIMARY) HYPERTENSION   


Qualifiers: 


   Hypertension type: essential hypertension   Qualified Code(s): I10 - 

Essential (primary) hypertension   





(6) Atrial flutter


Code(s): I48.92 - UNSPECIFIED ATRIAL FLUTTER   


Qualifiers: 


   Atrial flutter type: unspecified   Qualified Code(s): I48.92 - Unspecified 

atrial flutter   





(7) HLD (hyperlipidemia)


Code(s): E78.5 - HYPERLIPIDEMIA, UNSPECIFIED   


Qualifiers: 


   Hyperlipidemia type: pure hypercholesterolemia   Qualified Code(s): E78.00 - 

Pure hypercholesterolemia, unspecified; E78.0 - Pure hypercholesterolemia   





(8) Hyperthyroidism


Code(s): E05.90 - THYROTOXICOSIS, UNSP WITHOUT THYROTOXIC CRISIS OR STORM   





(9) Obesity


Code(s): E66.9 - OBESITY, UNSPECIFIED   





(10) Epigastric pain


Code(s): R10.13 - EPIGASTRIC PAIN   





Assessment/Plan


11/28/2018 Normal LV size and fxn, mod ND, severe TR, mild MR





1. Chest pain syndrome with mild elevation of troponin referable to demand 

ischemia, rule out CAD


2. Atrial fibrillation/flutter [id number] score likely 6 on NOAC


3. Epigastric / RUQ abdominal pain - imaging findings of cholelithiasis and 

distended GB r/o biliary etiology vs primary gastric etiology such as peptic 

ulcer disease


4. Acute on CKD (pre-renal) improved


4. HTN


5. Type 2 DM


6. Hypercholesterolemia


7. Hyperthyroidism 


8. Cholelithiasis


9. Nephrolithiasis





PLAN:


1. Troponins have peaked


2. Continue Eliquis 2.5 mg BID (age > 80 and Cr >1.5)


3. Continue Lipitor 20 qhs, losartan 25 qd, Toprol  qd


4. Echocardiography to assess LV/RV and valvular function


5. Ischemia evaluation as outpatient and will need to assess duration of AF and 

determine rate control vs rhythm control strategy


6. Clear liquid, PPI, HIDA scan per GI recs, d/c telemetry

## 2018-12-03 NOTE — PN
Progress Note, Physician


History of Present Illness: 





Planned for percutaneous cholecystotomy placement. HIDA scan suspicious for 

acute cholecystitis. RUQ discomfort improved, afebrile on clear liquids.





- Current Medication List


Current Medications: 


Active Medications





Piperacillin Sod/Tazobactam (Sod 3.375 gm/ Dextrose)  50 mls @ 100 mls/hr IVPB 

Q8H-IV [facility]; Protocol


   Last Admin: 12/03/18 09:04 Dose:  100 mls/hr


Dextrose/Sodium Chloride (D5-1/2ns -)  1,000 mls @ 42 mls/hr IV [facility]


   Last Admin: 12/02/18 17:45 Dose:  42 mls/hr


Morphine Sulfate (Morphine Sulfate)  1 mg IVPUSH Q4H PRN


   PRN Reason: PAIN LEVEL 4 -10


   Last Admin: 12/03/18 10:08 Dose:  1 mg











- Objective


Vital Signs: 


 Vital Signs











Temperature  97.5 F L  12/03/18 13:42


 


Pulse Rate  89   12/03/18 13:42


 


Respiratory Rate  17   12/03/18 13:42


 


Blood Pressure  123/64   12/03/18 13:42


 


O2 Sat by Pulse Oximetry (%)  97   12/03/18 09:00











Constitutional: Yes: No Distress, Calm


Neck: Yes: Supple


Cardiovascular: Yes: Regular Rate and Rhythm


Respiratory: Yes: Regular, Diminished


Gastrointestinal: Yes: Soft, Abdomen, Obese, Hypoactive Bowel Sounds, 

Tenderness (RUQ)


Edema: No


Labs: 


 CBC, BMP





 12/03/18 06:15 





 12/03/18 06:15 





 INR, PTT











INR  1.31  (0.83-1.09)  H  12/03/18  06:15    














Problem List





- Problems


(1) Demand ischemia


Code(s): I24.8 - OTHER FORMS OF ACUTE ISCHEMIC HEART DISEASE   





(2) MARLEEN (acute kidney injury)


Code(s): N17.9 - ACUTE KIDNEY FAILURE, UNSPECIFIED   





(3) CKD (chronic kidney disease)


Code(s): N18.9 - CHRONIC KIDNEY DISEASE, UNSPECIFIED   


Qualifiers: 


   Chronic kidney disease stage: stage 2 (mild)   Qualified Code(s): N18.2 - 

Chronic kidney disease, stage 2 (mild)   





(4) Chest pain


Code(s): R07.9 - CHEST PAIN, UNSPECIFIED   


Qualifiers: 


   Chest pain type: chest pain due to myocardial ischemia   Ischemic chest pain 

type: stable angina pectoris   Qualified Code(s): I20.8 - Other forms of angina 

pectoris   





(5) HTN (hypertension)


Code(s): I10 - ESSENTIAL (PRIMARY) HYPERTENSION   


Qualifiers: 


   Hypertension type: essential hypertension   Qualified Code(s): I10 - 

Essential (primary) hypertension   





(6) Atrial flutter


Code(s): I48.92 - UNSPECIFIED ATRIAL FLUTTER   


Qualifiers: 


   Atrial flutter type: unspecified   Qualified Code(s): I48.92 - Unspecified 

atrial flutter   





(7) HLD (hyperlipidemia)


Code(s): E78.5 - HYPERLIPIDEMIA, UNSPECIFIED   


Qualifiers: 


   Hyperlipidemia type: pure hypercholesterolemia   Qualified Code(s): E78.00 - 

Pure hypercholesterolemia, unspecified; E78.0 - Pure hypercholesterolemia   





(8) Hyperthyroidism


Code(s): E05.90 - THYROTOXICOSIS, UNSP WITHOUT THYROTOXIC CRISIS OR STORM   





(9) Obesity


Code(s): E66.9 - OBESITY, UNSPECIFIED   





(10) Epigastric pain


Code(s): R10.13 - EPIGASTRIC PAIN   





Assessment/Plan


11/28/2018 Echo: Normal LV size and fxn, mild MR, AR, mod FL, severe TR





1. Chest pain syndrome with mild elevation of troponin referable to demand 

ischemia, rule out CAD


2. Atrial fibrillation/flutter [id number] score likely 6 on NOAC


3. Epigastric / RUQ abdominal pain with cholelithiasis and +HIDA scan c/w acute 

cholecystitis


4. Acute on CKD (pre-renal) improved


5. HTN


6. Type 2 DM


7. Hypercholesterolemia


8. Hyperthyroidism 


9. Nephrolithiasis





PLAN:


1. Troponins have peaked


2. Holding Eliquis 2.5 mg BID (age > 80 and Cr >1.5) 2 days prior to 

percutaneous cholecystotomy placement, may proceed from CV-standpoint


3. Resume Lipitor 20 qhs, losartan 25 qd, Toprol XL 25 qd as hemodynamics 

tolerate


4. Clear liquid, PPI, replete K


5. Ischemia evaluation as outpatient and will need to assess duration of AF and 

determine rate control vs rhythm control strategy

## 2018-12-04 RX ADMIN — ATORVASTATIN CALCIUM SCH MG: 20 TABLET, FILM COATED ORAL at 21:55

## 2018-12-04 RX ADMIN — MEMANTINE SCH MG: 10 TABLET ORAL at 10:01

## 2018-12-04 RX ADMIN — PIPERACILLIN SODIUM,TAZOBACTAM SODIUM SCH MLS/HR: 3; .375 INJECTION, POWDER, FOR SOLUTION INTRAVENOUS at 17:39

## 2018-12-04 RX ADMIN — PIPERACILLIN SODIUM,TAZOBACTAM SODIUM SCH MLS/HR: 3; .375 INJECTION, POWDER, FOR SOLUTION INTRAVENOUS at 02:10

## 2018-12-04 RX ADMIN — PIPERACILLIN SODIUM,TAZOBACTAM SODIUM SCH MLS/HR: 3; .375 INJECTION, POWDER, FOR SOLUTION INTRAVENOUS at 10:01

## 2018-12-04 RX ADMIN — METHIMAZOLE SCH MG: 10 TABLET ORAL at 10:01

## 2018-12-04 RX ADMIN — DEXTROSE AND SODIUM CHLORIDE SCH MLS/HR: 5; 450 INJECTION, SOLUTION INTRAVENOUS at 21:55

## 2018-12-04 NOTE — PN
Progress Note, Physician


Chief Complaint: 





Events noted


Denies chest pain, SOB or palpitations


History of Present Illness: 





Patient was seen and examined. Awake and alert. Chart was reviewed





- Current Medication List


Current Medications: 


Active Medications





Atorvastatin Calcium (Lipitor -)  20 mg PO HS [facility]


   Last Admin: 12/03/18 21:51 Dose:  20 mg


Piperacillin Sod/Tazobactam (Sod 3.375 gm/ Dextrose)  50 mls @ 100 mls/hr IVPB 

Q8H-IV [facility]; Protocol


   Last Admin: 12/04/18 10:01 Dose:  100 mls/hr


Dextrose/Sodium Chloride (D5-1/2ns -)  1,000 mls @ 42 mls/hr IV [facility]


   Last Admin: 12/03/18 17:45 Dose:  42 mls/hr


Memantine (Namenda -)  10 mg PO DAILY [facility]


   Last Admin: 12/04/18 10:01 Dose:  10 mg


Methimazole (Tapazole -)  10 mg PO DAILY [facility]


   Last Admin: 12/04/18 10:01 Dose:  10 mg


Metoprolol Succinate (Toprol Xl -)  25 mg PO DAILY [facility]


   Last Admin: 12/04/18 10:01 Dose:  25 mg


Morphine Sulfate (Morphine Sulfate)  1 mg IVPUSH Q4H PRN


   PRN Reason: PAIN LEVEL 4 -10


   Last Admin: 12/03/18 10:08 Dose:  1 mg











- Objective


Vital Signs: 


 Vital Signs











Temperature  98.0 F   12/04/18 09:57


 


Pulse Rate  85   12/04/18 09:57


 


Respiratory Rate  18   12/04/18 09:57


 


Blood Pressure  125/74   12/04/18 09:57


 


O2 Sat by Pulse Oximetry (%)  97   12/03/18 21:00











HENT: Yes: Atraumatic


Neck: Yes: Supple


Cardiovascular: Yes: Regular Rate and Rhythm, S1, S2


Respiratory: Yes: CTA Bilaterally


Gastrointestinal: Yes: Normal Bowel Sounds, Soft.  No: Tenderness


Edema: No


Labs: 


 CBC, BMP





 12/03/18 06:15 





 12/03/18 06:15 





 INR, PTT











INR  1.31  (0.83-1.09)  H  12/03/18  06:15    














Problem List





- Problems


(1) HTN (hypertension)


Code(s): I10 - ESSENTIAL (PRIMARY) HYPERTENSION   


Qualifiers: 


   Hypertension type: essential hypertension   Qualified Code(s): I10 - 

Essential (primary) hypertension   





(2) CKD (chronic kidney disease)


Code(s): N18.9 - CHRONIC KIDNEY DISEASE, UNSPECIFIED   


Qualifiers: 


   Chronic kidney disease stage: stage 2 (mild)   Qualified Code(s): N18.2 - 

Chronic kidney disease, stage 2 (mild)   





(3) Chest pain


Code(s): R07.9 - CHEST PAIN, UNSPECIFIED   


Qualifiers: 


   Chest pain type: chest pain due to myocardial ischemia   Ischemic chest pain 

type: stable angina pectoris   Qualified Code(s): I20.8 - Other forms of angina 

pectoris   





(4) LUQ abdominal pain


Code(s): R10.12 - LEFT UPPER QUADRANT PAIN   





(5) Atrial flutter


Code(s): I48.92 - UNSPECIFIED ATRIAL FLUTTER   


Qualifiers: 


   Atrial flutter type: unspecified   Qualified Code(s): I48.92 - Unspecified 

atrial flutter   





(6) Diabetes


Code(s): E11.9 - TYPE 2 DIABETES MELLITUS WITHOUT COMPLICATIONS   


Qualifiers: 


   Diabetes mellitus type: type 2 





(7) HLD (hyperlipidemia)


Code(s): E78.5 - HYPERLIPIDEMIA, UNSPECIFIED   


Qualifiers: 


   Hyperlipidemia type: pure hypercholesterolemia   Qualified Code(s): E78.00 - 

Pure hypercholesterolemia, unspecified; E78.0 - Pure hypercholesterolemia   





(8) Hyperthyroidism


Code(s): E05.90 - THYROTOXICOSIS, UNSP WITHOUT THYROTOXIC CRISIS OR STORM   





Assessment/Plan





1. Chest pain syndrome with mild elevation of troponin referable to demand 

ischemia


2. Atrial fibrillation/flutter [id number] score likely 6 on NOAC


3. Epigastric / RUQ abdominal pain with cholelithiasis and positive HIDA scan c/

w acute cholecystitis


4. Acute on CKD (pre-renal) 


5. HTN


6. Type 2 DM


7. Hypercholesterolemia


8. Hyperthyroidism 


9. Nephrolithiasis





PLAN:


1. Holding Eliquis 2.5 mg BID (age > 80 and Cr >1.5) 2 days prior to 

percutaneous cholecystotomy placement and she may proceed from cardiac-

standpoint


2. Resume Lipitor 20 mg QD, Losartan 25 mg QD and Toprol XL 25 mg QD as 

hemodynamics tolerate


3. Further strategy for AF to follow whether rate control or rhythm control





Further plans are to follow


Moiz Wilson MD

## 2018-12-04 NOTE — PN
Progress Note, Physician





- Current Medication List


Current Medications: 


Active Medications





Atorvastatin Calcium (Lipitor -)  20 mg PO HS [facility]


   Last Admin: 12/03/18 21:51 Dose:  20 mg


Piperacillin Sod/Tazobactam (Sod 3.375 gm/ Dextrose)  50 mls @ 100 mls/hr IVPB 

Q8H-IV [facility]; Protocol


   Last Admin: 12/04/18 10:01 Dose:  100 mls/hr


Dextrose/Sodium Chloride (D5-1/2ns -)  1,000 mls @ 42 mls/hr IV [facility]


   Last Admin: 12/03/18 17:45 Dose:  42 mls/hr


Memantine (Namenda -)  10 mg PO DAILY [facility]


   Last Admin: 12/04/18 10:01 Dose:  10 mg


Methimazole (Tapazole -)  10 mg PO DAILY [facility]


   Last Admin: 12/04/18 10:01 Dose:  10 mg


Metoprolol Succinate (Toprol Xl -)  25 mg PO DAILY [facility]


   Last Admin: 12/04/18 10:01 Dose:  25 mg


Morphine Sulfate (Morphine Sulfate)  1 mg IVPUSH Q4H PRN


   PRN Reason: PAIN LEVEL 4 -10


   Last Admin: 12/03/18 10:08 Dose:  1 mg











- Objective


Vital Signs: 


 Vital Signs











Temperature  98.0 F   12/04/18 09:57


 


Pulse Rate  85   12/04/18 09:57


 


Respiratory Rate  18   12/04/18 09:57


 


Blood Pressure  125/74   12/04/18 09:57


 


O2 Sat by Pulse Oximetry (%)  97   12/03/18 21:00











Labs: 


 CBC, BMP





 12/03/18 06:15 





 12/03/18 06:15 





 INR, PTT











INR  1.31  (0.83-1.09)  H  12/03/18  06:15

## 2018-12-04 NOTE — PN
Progress Note, Physician


Chief Complaint: 





abdominal pain


History of Present Illness: 





84 yo female PMH type 2 DM, HTN, hypercholesterolemia, bronchial asthma/COPD, 

atrial fibrillation/flutter on NOAC and hyperthyroidism on treatment who 

presents with left upper abdominal discomfort and sharp chest discomfort across 

her sternum. Abdominal pain has improved on clears and antibiotics. 











- Current Medication List


Current Medications: 


Active Medications





Atorvastatin Calcium (Lipitor -)  20 mg PO HS [facility]


   Last Admin: 12/03/18 21:51 Dose:  20 mg


Piperacillin Sod/Tazobactam (Sod 3.375 gm/ Dextrose)  50 mls @ 100 mls/hr IVPB 

Q8H-IV [facility]; Protocol


   Last Admin: 12/04/18 10:01 Dose:  100 mls/hr


Dextrose/Sodium Chloride (D5-1/2ns -)  1,000 mls @ 42 mls/hr IV [facility]


   Last Admin: 12/03/18 17:45 Dose:  42 mls/hr


Memantine (Namenda -)  10 mg PO DAILY [facility]


   Last Admin: 12/04/18 10:01 Dose:  10 mg


Methimazole (Tapazole -)  10 mg PO DAILY [facility]


   Last Admin: 12/04/18 10:01 Dose:  10 mg


Metoprolol Succinate (Toprol Xl -)  25 mg PO DAILY [facility]


   Last Admin: 12/04/18 10:01 Dose:  25 mg


Morphine Sulfate (Morphine Sulfate)  1 mg IVPUSH Q4H PRN


   PRN Reason: PAIN LEVEL 4 -10


   Last Admin: 12/03/18 10:08 Dose:  1 mg











- Objective


Vital Signs: 


 Vital Signs











Temperature  98.0 F   12/04/18 09:57


 


Pulse Rate  85   12/04/18 09:57


 


Respiratory Rate  18   12/04/18 09:57


 


Blood Pressure  125/74   12/04/18 09:57


 


O2 Sat by Pulse Oximetry (%)  97   12/03/18 21:00








 Vital Signs











 Period  Temp  Pulse  Resp  BP Sys/Lennon  Pulse Ox


 


 Last 24 Hr  97.5 F-98.2 F  84-93  17-18  121-142/60-74  97











Constitutional: Yes: Well Nourished, No Distress, Calm, Obese


Eyes: Yes: Conjunctiva Clear, EOM Intact


HENT: Yes: Atraumatic, Normocephalic


Neck: Yes: Supple, Trachea Midline


Cardiovascular: Yes: Regular Rate and Rhythm, S1, S2


Respiratory: Yes: Regular, CTA Bilaterally


Gastrointestinal: Yes: Normal Bowel Sounds, Soft, Abdomen, Obese.  No: 

Tenderness, Tenderness, Epigastrium, Tenderness, Rebound


...Rectal Exam: Yes: Deferred


Genitourinary: No: CVA Tenderness - Left, CVA Tenderness - Right


Extremities: No: Cool, Cyanosis


Edema: No


Peripheral Pulses WNL: Yes


Peripheral Pulses: Left Radial: 2+, Right Radial: 2+, Left Doralis Pedis: 2+, 

Right Dorsalis Pedis: 2+


Integumentary: No: Erythema, Jaundice, Pressure Ulcer, Rash


Neurological: Yes: Alert, Oriented


Psychiatric: Yes: Alert, Oriented


Labs: 


 CBC, BMP





 12/03/18 06:15 





 12/03/18 06:15 





 INR, PTT











INR  1.31  (0.83-1.09)  H  12/03/18  06:15    














Problem List





- Problems


(1) Abnormal biliary HIDA scan


Assessment/Plan: 


84 yo female with MMP and recent demand ishemia vs CAD with image confirmed 

acute cholecystitis,  poor surgical candidate this was discussed with the 

patient and family. will proceed with alternative less invasive management 

options. She has been cleared by cardiology. 





Resume diet 


NPO after midnight 


continue IV antibiotics 


hold eloquis for cholecystostomy tube on We 12/5 (LAST DOSE WAS 11/28 @10pm per 

Pharmacy)


IR for cholecystostomy on 12/5





Thank you for the opportunity to participate in the care of this patient.








Code(s): R94.8 - ABNORMAL RESULTS OF FUNCTION STUDIES OF ORGANS AND SYSTEMS   





(2) Chest pain


Code(s): R07.9 - CHEST PAIN, UNSPECIFIED   


Qualifiers: 


   Chest pain type: chest pain due to myocardial ischemia   Ischemic chest pain 

type: stable angina pectoris   Qualified Code(s): I20.8 - Other forms of angina 

pectoris   





(3) Demand ischemia


Code(s): I24.8 - OTHER FORMS OF ACUTE ISCHEMIC HEART DISEASE   





(4) Acute on chronic systolic (congestive) heart failure


Code(s): I50.23 - ACUTE ON CHRONIC SYSTOLIC (CONGESTIVE) HEART FAILURE   





(5) Atrial flutter


Code(s): I48.92 - UNSPECIFIED ATRIAL FLUTTER   


Qualifiers: 


   Atrial flutter type: unspecified   Qualified Code(s): I48.92 - Unspecified 

atrial flutter   





(6) Obesity


Code(s): E66.9 - OBESITY, UNSPECIFIED

## 2018-12-04 NOTE — PN
Progress Note, Physician


History of Present Illness: 


feeling good no pain after dinner





- Current Medication List


Current Medications: 


Active Medications





Atorvastatin Calcium (Lipitor -)  [facility]


   Last Admin: 12/03/18 21:51 Dose:  20 mg


Piperacillin Sod/Tazobactam (Sod 3.375 gm/ Dextrose)  50 mls @ 100 mls/hr IVPB 

Q8H-IV [facility]; Protocol


   Last Admin: 12/04/18 17:39 Dose:  100 mls/hr


Dextrose/Sodium Chloride (D5-1/2ns -)  1,000 mls @ 42 mls/hr IV [facility]


   Last Admin: 12/03/18 17:45 Dose:  42 mls/hr


Memantine (Namenda -)  [facility] PO [facility]ILY [facility]


   Last Admin: 12/04/18 10:01 Dose:  10 mg


Methimazole (Tapazole -)  10 mg PO [facility]ILY [facility]


   Last Admin: 12/04/18 10:01 Dose:  10 mg


Metoprolol Succinate (Toprol Xl -)  25 mg PO [facility]ILY [facility]


   Last Admin: 12/04/18 10:01 Dose:  25 mg


Morphine Sulfate (Morphine Sulfate)  1 mg IVPUSH Q4H PRN


   PRN Reason: PAIN LEVEL 4 -10


   Last Admin: 12/03/18 10:08 Dose:  1 mg











- Objective


Vital Signs: 


 Vital Signs











Temperature  98 F   12/04/18 13:43


 


Pulse Rate  76   12/04/18 13:43


 


Respiratory Rate  16   12/04/18 13:43


 


Blood Pressure  103/52 L  12/04/18 13:43


 


O2 Sat by Pulse Oximetry (%)  96   12/04/18 09:00











Constitutional: Yes: No Distress


HENT: Yes: Atraumatic


Neck: Yes: Supple


Cardiovascular: Yes: Regular Rate and Rhythm


Respiratory: Yes: CTA Bilaterally


Gastrointestinal: Yes: Normal Bowel Sounds


Extremities: Yes: WNL


Edema: No


Peripheral Pulses WNL: Yes


Neurological: Yes: Alert, Oriented


Labs: 


 CBC, BMP





 12/03/18 06:15 





 12/03/18 06:15 





 INR, PTT











INR  1.31  (0.83-1.09)  H  12/03/18  06:15    














Problem List





- Problems


(1) Chest pain


Assessment/Plan: 


pain resolved





Code(s): R07.9 - CHEST PAIN, UNSPECIFIED   


Qualifiers: 


   Chest pain type: chest pain due to myocardial ischemia   Ischemic chest pain 

type: stable angina pectoris   Qualified Code(s): I20.8 - Other forms of angina 

pectoris   





(2) Diabetes


Assessment/Plan: 





bgms...monitor


on insulin coverage


Code(s): E11.9 - TYPE 2 DIABETES MELLITUS WITHOUT COMPLICATIONS   


Qualifiers: 


   Diabetes mellitus type: type 2 





(3) Epigastric pain


Assessment/Plan: 


not today


Code(s): R10.13 - EPIGASTRIC PAIN   





(4) HLD (hyperlipidemia)


Assessment/Plan: 


on meds


Code(s): E78.5 - HYPERLIPIDEMIA, UNSPECIFIED   


Qualifiers: 


   Hyperlipidemia type: pure hypercholesterolemia   Qualified Code(s): E78.00 - 

Pure hypercholesterolemia, unspecified; E78.0 - Pure hypercholesterolemia   





(5) Hyperthyroidism


Assessment/Plan: 


on meds


stable


Code(s): E05.90 - THYROTOXICOSIS, UNSP WITHOUT THYROTOXIC CRISIS OR STORM   





(6) Obesity


Code(s): E66.9 - OBESITY, UNSPECIFIED   





(7) MARLEEN (acute kidney injury)


Assessment/Plan: 


resolved


Code(s): N17.9 - ACUTE KIDNEY FAILURE, UNSPECIFIED   





(8) Demand ischemia


Code(s): I24.8 - OTHER FORMS OF ACUTE ISCHEMIC HEART DISEASE   





(9) HTN (hypertension)


Code(s): I10 - ESSENTIAL (PRIMARY) HYPERTENSION   


Qualifiers: 


   Hypertension type: essential hypertension   Qualified Code(s): I10 - 

Essential (primary) hypertension   





(10) Hypokalemia


Assessment/Plan: 


will check level


Code(s): E87.6 - HYPOKALEMIA   





(11) Acute cholecystitis


Assessment/Plan: 


hida positive


for cholecystotomy tube tomorrow


Code(s): K81.0 - ACUTE CHOLECYSTITIS

## 2018-12-04 NOTE — CONSULT
Consult


Consult Specialty:: Endocrinology


Referred by:: Dr Manriquez


Reason for Consultation:: Management of blood sugar





- History of Present Illness


Chief Complaint: Abd pain


History of Present Illness: 


This is an 82 yo female with h/o T2DM  for >20 years, on Insulin for around 10 

years, HTN, hypercholesterolemia, bronchial asthma/COPD, atrial fibrillation/

flutter on NOAC and hyperthyroidism on treatment who presents with left upper 

abdominal discomfort and sharp chest discomfort across her sternum. She also 

complained of nausea. She suffers from constipation. She denies shortness of 

breath or palpitations. She denies paroxysmal nocturnal dyspnea or orthopnea. 

She denies fever or chills. She denies headache or lightheadedness. She denies 

diarrhea or vomiting. HIDA shows non-visulaization of the GB. Pt treated with 

IV abx with improvement in abdominal pain. Pt referred for management of blood 

sugar. Pt currently on D5 1/2NS and liquid diet. FS at home 77 to 170. No 

hypos. No hospitalization for hypo or hyperglycemia. Takes Lantus 25 to 30 

daily and Bydureon at home.





- History Source


History Provided By: Patient, Family Member, Medical Record





- Past Medical History


Cardio/Vascular: Yes: AFIB, HTN, Hyperlipdemia


Pulmonary: Yes: COPD


Infectious Disease: No: AIDS, C-Diff, Herpes Zoster, HIV, MRSA, STD's, 

Tuberculosis, VREF, Other


Psych: No: Addictions, Anxiety, Bipolar, Depression, Panic, Psychosis, 

Schizophrenia, Other


Musculoskeletal: No: Bursitis, Chronic low back pain, Hemiparesis, Hemiplegia, 

Osteoarthritis, Paraplegia, Other


Rheumatology: No: Fibromyalgia, Gout, Lupus, Rheumatoid Arthritis, Sarcoidosis, 

Vasculitis, Other


ENT: No: Allergic Rhinitis, Sinusitis, Other


Endocrine: Yes: Diabetes Mellitus, Hyperthyroidism.  No: [place]'s Disease, 

Cushing's Disease, Diabetes Insipidus, Hyperparathyroidism, Hypothyroidism, 

Osteopenia, SIADH, Other


Dermatology: No: Basal Cell, Cellulitis, Eczema, Melanoma, Psoriasis, Squamous 

Cell, Other





- Alcohol/Substance Use


Hx Alcohol Use: No





- Smoking History


Smoking history: Unknown if ever smoked


Have you smoked in the past 12 months: No


Aproximately how many cigarettes per day: 0


If you are a former smoker, when did you quit?: 60years ago





- Social History


Usual Living Arrangement: Other





Home Medications





- Allergies


Allergies/Adverse Reactions: 


 Allergies











Allergy/AdvReac Type Severity Reaction Status Date / Time


 


No Known Allergies Allergy   Verified 11/26/18 20:09














- Home Medications


Home Medications: 


Ambulatory Orders





Albuterol 0.083% Nebulizer Sol [Ventolin 0.083% Nebulizer Soln -] 1 amp NEB Q4H 

PRN #30 amp MDD 4 06/08/18 


Memantine HCl [Namenda -] 10 mg PO DAILY@0800 #30 tablet MDD 1 06/08/18 


Brimonidine Tartrate/Timolol [Combigan 0.2%-0.5% Eye Drops] 5 ml OP DAILY 08/14/ 18 


Exenatide Microspheres [Bydureon Pen] 2 mg SQ WEEKLY 08/14/18 


Methimazole [Tapazole -] 10 mg PO DAILY MDD 2 08/14/18 


Pantoprazole Sodium [Protonix] 40 mg PO DAILY 08/14/18 


Atorvastatin Ca [Lipitor] 20 mg PO HS #30 tablet 08/20/18 


Furosemide [Lasix -] 40 mg PO DAILY #30 tablet 08/20/18 


Insulin Glargine,Hum.rec.anlog [Lantus Solostar PEN -] 40 units SQ HS 11/26/18 


Metoprolol Succinate [Toprol XL -] 100 mg PO DAILY 11/26/18 


Omeprazole Magnesium [Acid Reducer] 40 mg PO DAILY 11/26/18 


Ondansetron HCl [Zofran] 4 mg PO TID PRN 11/26/18 


Apixaban [Eliquis -] 2.5 mg PO BID #20 tablet 11/28/18 


Losartan Potassium [Cozaar -] 25 mg PO DAILY #30 tablet 11/28/18 











Family Disease History





- Family Disease History


Family Disease History: Diabetes: Brother





Review of Systems





- Review of Systems


Constitutional: reports: No Symptoms


Eyes: reports: No Symptoms


HENT: reports: No Symptoms


Neck: reports: No Symptoms


Cardiovascular: reports: No Symptoms


Respiratory: reports: No Symptoms


Gastrointestinal: reports: No Symptoms


Genitourinary: reports: No Symptoms


Musculoskeletal: reports: No Symptoms


Neurological: reports: No Symptoms


Endocrine: reports: No Symptoms





Physical Exam


Vital Signs: 


 Vital Signs











Temperature  98 F   12/04/18 13:43


 


Pulse Rate  76   12/04/18 13:43


 


Respiratory Rate  16   12/04/18 13:43


 


Blood Pressure  103/52 L  12/04/18 13:43


 


O2 Sat by Pulse Oximetry (%)  96   12/04/18 09:00











Constitutional: Yes: No Distress, Calm


Eyes: Yes: Conjunctiva Clear, EOM Intact


HENT: Yes: Atraumatic, Normocephalic


Neck: Yes: Supple, Trachea Midline


Cardiovascular: Yes: Regular Rate and Rhythm


Respiratory: Yes: Regular, CTA Bilaterally


Gastrointestinal: Yes: Normal Bowel Sounds, Soft


Musculoskeletal: Yes: WNL


Extremities: Yes: WNL


Edema: Yes


Neurological: Yes: Alert, Oriented


Labs: 


 CBC, BMP





 12/03/18 06:15 





 12/03/18 06:15 











Assessment/Plan


AP:





T2DM:


BGM QACHS


NOvolg SS coverage


Will increase dose as necessary with increase in food intake





Cholecystitis





Hyperthyroidism


Check TFT with TSI





HTN





HLD





Will F/U

## 2018-12-05 LAB
ALBUMIN SERPL-MCNC: 3 G/DL (ref 3.4–5)
ALP SERPL-CCNC: 112 U/L (ref 45–117)
ALT SERPL-CCNC: 16 U/L (ref 13–61)
ANION GAP SERPL CALC-SCNC: 8 MMOL/L (ref 8–16)
AST SERPL-CCNC: 13 U/L (ref 15–37)
BASOPHILS # BLD: 0.5 % (ref 0–2)
BILIRUB SERPL-MCNC: 1.1 MG/DL (ref 0.2–1)
BUN SERPL-MCNC: 7 MG/DL (ref 7–18)
CALCIUM SERPL-MCNC: 8.9 MG/DL (ref 8.5–10.1)
CHLORIDE SERPL-SCNC: 104 MMOL/L (ref 98–107)
CO2 SERPL-SCNC: 29 MMOL/L (ref 21–32)
CREAT SERPL-MCNC: 0.8 MG/DL (ref 0.55–1.3)
DEPRECATED RDW RBC AUTO: 18 % (ref 11.6–15.6)
EOSINOPHIL # BLD: 4 % (ref 0–4.5)
GLUCOSE SERPL-MCNC: 118 MG/DL (ref 74–106)
HCT VFR BLD CALC: 38.9 % (ref 32.4–45.2)
HGB BLD-MCNC: 12.5 GM/DL (ref 10.7–15.3)
LYMPHOCYTES # BLD: 27.1 % (ref 8–40)
MCH RBC QN AUTO: 26.7 PG (ref 25.7–33.7)
MCHC RBC AUTO-ENTMCNC: 32.1 G/DL (ref 32–36)
MCV RBC: 83.1 FL (ref 80–96)
MONOCYTES # BLD AUTO: 6.7 % (ref 3.8–10.2)
NEUTROPHILS # BLD: 61.7 % (ref 42.8–82.8)
PLATELET # BLD AUTO: 173 K/MM3 (ref 134–434)
PMV BLD: 8.4 FL (ref 7.5–11.1)
POTASSIUM SERPLBLD-SCNC: 3.6 MMOL/L (ref 3.5–5.1)
PROT SERPL-MCNC: 6.1 G/DL (ref 6.4–8.2)
RBC # BLD AUTO: 4.68 M/MM3 (ref 3.6–5.2)
SODIUM SERPL-SCNC: 141 MMOL/L (ref 136–145)
WBC # BLD AUTO: 6.2 K/MM3 (ref 4–10)

## 2018-12-05 RX ADMIN — MEMANTINE SCH: 10 TABLET ORAL at 12:03

## 2018-12-05 RX ADMIN — PIPERACILLIN SODIUM,TAZOBACTAM SODIUM SCH MLS/HR: 3; .375 INJECTION, POWDER, FOR SOLUTION INTRAVENOUS at 17:53

## 2018-12-05 RX ADMIN — METHIMAZOLE SCH MG: 10 TABLET ORAL at 15:01

## 2018-12-05 RX ADMIN — METHIMAZOLE SCH: 10 TABLET ORAL at 12:03

## 2018-12-05 RX ADMIN — INSULIN ASPART SCH: 100 INJECTION, SOLUTION INTRAVENOUS; SUBCUTANEOUS at 17:54

## 2018-12-05 RX ADMIN — ATORVASTATIN CALCIUM SCH MG: 20 TABLET, FILM COATED ORAL at 21:54

## 2018-12-05 RX ADMIN — PIPERACILLIN SODIUM,TAZOBACTAM SODIUM SCH MLS/HR: 3; .375 INJECTION, POWDER, FOR SOLUTION INTRAVENOUS at 10:12

## 2018-12-05 RX ADMIN — DEXTROSE AND SODIUM CHLORIDE SCH: 5; 450 INJECTION, SOLUTION INTRAVENOUS at 17:54

## 2018-12-05 RX ADMIN — INSULIN ASPART SCH: 100 INJECTION, SOLUTION INTRAVENOUS; SUBCUTANEOUS at 06:16

## 2018-12-05 RX ADMIN — PIPERACILLIN SODIUM,TAZOBACTAM SODIUM SCH MLS/HR: 3; .375 INJECTION, POWDER, FOR SOLUTION INTRAVENOUS at 02:21

## 2018-12-05 RX ADMIN — MEMANTINE SCH MG: 10 TABLET ORAL at 15:01

## 2018-12-05 RX ADMIN — INSULIN ASPART SCH: 100 INJECTION, SOLUTION INTRAVENOUS; SUBCUTANEOUS at 12:01

## 2018-12-05 NOTE — PN
Progress Note, Physician


Chief Complaint: 





abdominal pain


History of Present Illness: 





84 yo female PMH type 2 DM, HTN, hypercholesterolemia, bronchial asthma/COPD, 

atrial fibrillation/flutter on NOAC and hyperthyroidism on treatment who 

presents with left upper abdominal discomfort and sharp chest discomfort across 

her sternum. Abdominal pain has improved on clears and antibiotics. 











- Current Medication List


Current Medications: 


Active Medications





Atorvastatin Calcium (Lipitor -)  20 mg PO HS [facility]


   Last Admin: 12/04/18 21:55 Dose:  20 mg


Piperacillin Sod/Tazobactam (Sod 3.375 gm/ Dextrose)  50 mls @ 100 mls/hr IVPB 

Q8H-IV [facility]; Protocol


   Last Admin: 12/05/18 02:21 Dose:  100 mls/hr


Dextrose/Sodium Chloride (D5-1/2ns -)  1,000 mls @ 42 mls/hr IV ASDIR [facility]


   Last Admin: 12/04/18 21:55 Dose:  42 mls/hr


Insulin Aspart (Novolog Vial Sliding Scale -)  1 vial SQ TIDAC [facility]; Protocol


   Last Admin: 12/05/18 06:16 Dose:  Not Given


Memantine (Namenda -)  10 mg PO DAILY [facility]


   Last Admin: 12/04/18 10:01 Dose:  10 mg


Methimazole (Tapazole -)  10 mg PO DAILY [facility]


   Last Admin: 12/04/18 10:01 Dose:  10 mg


Metoprolol Succinate (Toprol Xl -)  25 mg PO DAILY [facility]


   Last Admin: 12/04/18 10:01 Dose:  25 mg


Morphine Sulfate (Morphine Sulfate)  1 mg IVPUSH Q4H PRN


   PRN Reason: PAIN LEVEL 4 -10


   Last Admin: 12/03/18 10:08 Dose:  1 mg











- Objective


Vital Signs: 


 Vital Signs











Temperature  98.6 F   12/05/18 08:59


 


Pulse Rate  86   12/05/18 08:59


 


Respiratory Rate  20   12/05/18 08:59


 


Blood Pressure  135/89   12/05/18 08:59


 


O2 Sat by Pulse Oximetry (%)  96   12/04/18 21:00








 Vital Signs











 Period  Temp  Pulse  Resp  BP Sys/Lennon  Pulse Ox


 


 Last 24 Hr  98 F-98.6 F  76-89  16-20  103-136/52-89  96








 Intake & Output











 12/04/18 12/05/18 12/05/18





 23:59 07:59 15:59


 


Intake Total 1370 512 


 


Balance 1370 512 


 


Intake:   


 


   462 


 


    D5-1/2Ns - 1,000 ml @ 42 420 462 





    mls/hr IV ASDIR [facility] Rx#:   





    [id number]   


 


  IVPB 100 50 


 


  Oral 850  


 


Other:   


 


  Voiding Method Toilet Toilet 


 


  # Unmeasured Voids   


 


    Void 2 2 


 


  Bowel Movement No No 


 


  # Bowel Movements 1  











Constitutional: Yes: Well Nourished, No Distress, Calm, Obese


Eyes: Yes: Conjunctiva Clear, EOM Intact


HENT: Yes: Atraumatic, Normocephalic


Neck: Yes: Supple, Trachea Midline


Cardiovascular: Yes: Regular Rate and Rhythm, S1, S2


Respiratory: Yes: Regular, CTA Bilaterally


Gastrointestinal: Yes: Normal Bowel Sounds, Soft, Abdomen, Obese.  No: 

Tenderness


...Rectal Exam: Yes: Deferred


Genitourinary: No: CVA Tenderness - Left, CVA Tenderness - Right


Extremities: No: Cool, Cyanosis, External Rotation


Edema: No


Peripheral Pulses WNL: Yes


Peripheral Pulses: Left Radial: 2+, Right Radial: 2+, Left Doralis Pedis: 2+, 

Right Dorsalis Pedis: 2+


Integumentary: No: Jaundice, Pressure Ulcer, Rash


Neurological: Yes: Alert, Oriented


Psychiatric: Yes: Alert, Oriented


Labs: 


 CBC, BMP





 12/05/18 06:00 





 12/05/18 06:00 





 INR, PTT











INR  1.31  (0.83-1.09)  H  12/03/18  06:15    














Problem List





- Problems


(1) Abnormal biliary HIDA scan


Assessment/Plan: 


84 yo female with MMP and recent demand ishemia vs CAD with HIDA confirmed 

acute cholecystitis,  poor surgical candidate this was discussed with the 

patient and family. will proceed with alternative less invasive management 

options. She has been cleared by cardiology. Discussed with Dr. Foster the 

need for acute intervention. 





Resume diet 


Continue IV antibiotics per ID 


Resume eloquis 


May be discharged home on low fat diet 


f/u with PMD for clearance for Laparoscopic Cholecystectomy


May f/u for interval cholecystetcomy in 1 month 











Code(s): R94.8 - ABNORMAL RESULTS OF FUNCTION STUDIES OF ORGANS AND SYSTEMS   





(2) Chest pain


Code(s): R07.9 - CHEST PAIN, UNSPECIFIED   


Qualifiers: 


   Chest pain type: chest pain due to myocardial ischemia   Ischemic chest pain 

type: stable angina pectoris   Qualified Code(s): I20.8 - Other forms of angina 

pectoris   





(3) Demand ischemia


Code(s): I24.8 - OTHER FORMS OF ACUTE ISCHEMIC HEART DISEASE   





(4) Acute on chronic systolic (congestive) heart failure


Code(s): I50.23 - ACUTE ON CHRONIC SYSTOLIC (CONGESTIVE) HEART FAILURE   





(5) Atrial flutter


Code(s): I48.92 - UNSPECIFIED ATRIAL FLUTTER   


Qualifiers: 


   Atrial flutter type: unspecified   Qualified Code(s): I48.92 - Unspecified 

atrial flutter   





(6) Obesity


Code(s): E66.9 - OBESITY, UNSPECIFIED

## 2018-12-05 NOTE — PN
Progress Note, Physician


History of Present Illness: 


Planned for percutaneous cholecystotomy placement. HIDA scan suspicious for 

acute cholecystitis. RUQ discomfort improved, afebrile on clear liquids.





- Current Medication List


Current Medications: 


Active Medications





Atorvastatin Calcium (Lipitor -)  20 mg PO HS [facility]


   Last Admin: 12/04/18 21:55 Dose:  20 mg


Piperacillin Sod/Tazobactam (Sod 3.375 gm/ Dextrose)  50 mls @ 100 mls/hr IVPB 

Q8H-IV [facility]; Protocol


   Last Admin: 12/05/18 10:12 Dose:  100 mls/hr


Dextrose/Sodium Chloride (D5-1/2ns -)  1,000 mls @ 42 mls/hr IV ASDIR [facility]


   Last Admin: 12/04/18 21:55 Dose:  42 mls/hr


Insulin Aspart (Novolog Vial Sliding Scale -)  1 vial SQ TIDAC [facility]; Protocol


   Last Admin: 12/05/18 12:01 Dose:  Not Given


Memantine (Namenda -)  10 mg PO DAILY [facility]


   Last Admin: 12/05/18 12:03 Dose:  Not Given


Methimazole (Tapazole -)  10 mg PO DAILY [facility]


   Last Admin: 12/05/18 12:03 Dose:  Not Given


Metoprolol Succinate (Toprol Xl -)  25 mg PO DAILY [facility]


   Last Admin: 12/05/18 12:03 Dose:  Not Given


Morphine Sulfate (Morphine Sulfate)  1 mg IVPUSH Q4H PRN


   PRN Reason: PAIN LEVEL 4 -10


   Last Admin: 12/03/18 10:08 Dose:  1 mg











- Objective


Vital Signs: 


 Vital Signs











Temperature  98.6 F   12/05/18 08:59


 


Pulse Rate  86   12/05/18 08:59


 


Respiratory Rate  20   12/05/18 08:59


 


Blood Pressure  135/89   12/05/18 08:59


 


O2 Sat by Pulse Oximetry (%)  96   12/04/18 21:00











Constitutional: Yes: No Distress, Calm


Neck: Yes: Supple


Cardiovascular: Yes: Regular Rate and Rhythm, Murmur (2/6 SM)


Respiratory: Yes: Regular, Diminished


Gastrointestinal: Yes: Soft, Abdomen, Obese, Hypoactive Bowel Sounds


Edema: No


Labs: 


 CBC, BMP





 12/05/18 06:00 





 12/05/18 06:00 





 INR, PTT











INR  1.31  (0.83-1.09)  H  12/03/18  06:15    














Problem List





- Problems


(1) Demand ischemia


Code(s): I24.8 - OTHER FORMS OF ACUTE ISCHEMIC HEART DISEASE   





(2) MARLEEN (acute kidney injury)


Code(s): N17.9 - ACUTE KIDNEY FAILURE, UNSPECIFIED   





(3) Chest pain


Code(s): R07.9 - CHEST PAIN, UNSPECIFIED   


Qualifiers: 


   Chest pain type: chest pain due to myocardial ischemia   Ischemic chest pain 

type: stable angina pectoris   Qualified Code(s): I20.8 - Other forms of angina 

pectoris   





(4) HTN (hypertension)


Code(s): I10 - ESSENTIAL (PRIMARY) HYPERTENSION   


Qualifiers: 


   Hypertension type: essential hypertension   Qualified Code(s): I10 - 

Essential (primary) hypertension   





(5) Atrial flutter


Code(s): I48.92 - UNSPECIFIED ATRIAL FLUTTER   


Qualifiers: 


   Atrial flutter type: unspecified   Qualified Code(s): I48.92 - Unspecified 

atrial flutter   





(6) HLD (hyperlipidemia)


Code(s): E78.5 - HYPERLIPIDEMIA, UNSPECIFIED   


Qualifiers: 


   Hyperlipidemia type: pure hypercholesterolemia   Qualified Code(s): E78.00 - 

Pure hypercholesterolemia, unspecified; E78.0 - Pure hypercholesterolemia   





(7) Hyperthyroidism


Code(s): E05.90 - THYROTOXICOSIS, UNSP WITHOUT THYROTOXIC CRISIS OR STORM   





(8) Obesity


Code(s): E66.9 - OBESITY, UNSPECIFIED   





(9) Epigastric pain


Code(s): R10.13 - EPIGASTRIC PAIN   





Assessment/Plan


11/28/2018 Echo: Normal LV size and fxn, mild MR, AR, mod AZ, severe TR





1. Chest pain syndrome with mild elevation of troponin referable to demand 

ischemia


2. Atrial fibrillation/flutter [id number] score likely 6 on NOAC


3. Epigastric / RUQ abdominal pain with cholelithiasis and positive HIDA scan c/

w acute cholecystitis


4. Acute on CKD (pre-renal) 


5. HTN


6. Type 2 DM


7. Hypercholesterolemia


8. Hyperthyroidism 


9. Nephrolithiasis





PLAN:


1. Holding Eliquis 2.5 mg BID (age > 80 and Cr >1.5) 2 days prior to 

percutaneous cholecystotomy placement and she may proceed from cardiac-

standpoint


2. Continue Lipitor 20 mg QD, Losartan 25 mg QD and Toprol XL 25 mg QD as 

hemodynamics tolerate


3. Further strategy for AF to follow whether rate control or rhythm control


4. Eventual lap cholecystectomy as outpatient

## 2018-12-05 NOTE — PN
Progress Note, Physician





- Current Medication List


Current Medications: 


Active Medications





Atorvastatin Calcium (Lipitor -)  20 mg PO HS [facility]


   Last Admin: 12/04/18 21:55 Dose:  20 mg


Piperacillin Sod/Tazobactam (Sod 3.375 gm/ Dextrose)  50 mls @ 100 mls/hr IVPB 

Q8H-IV [facility]; Protocol


   Last Admin: 12/05/18 10:12 Dose:  100 mls/hr


Dextrose/Sodium Chloride (D5-1/2ns -)  1,000 mls @ 42 mls/hr IV [facility]


   Last Admin: 12/04/18 21:55 Dose:  42 mls/hr


Insulin Aspart (Novolog Vial Sliding Scale -)  1 vial SQ TIDAC [facility]; Protocol


   Last Admin: 12/05/18 12:01 Dose:  Not Given


Memantine (Namenda -)  10 mg PO DAILY [facility]


   Last Admin: 12/05/18 12:03 Dose:  Not Given


Methimazole (Tapazole -)  10 mg PO DAILY [facility]


   Last Admin: 12/05/18 12:03 Dose:  Not Given


Metoprolol Succinate (Toprol Xl -)  25 mg PO DAILY [facility]


   Last Admin: 12/05/18 12:03 Dose:  Not Given


Morphine Sulfate (Morphine Sulfate)  1 mg IVPUSH Q4H PRN


   PRN Reason: PAIN LEVEL 4 -10


   Last Admin: 12/03/18 10:08 Dose:  1 mg











- Objective


Vital Signs: 


 Vital Signs











Temperature  98.6 F   12/05/18 08:59


 


Pulse Rate  86   12/05/18 08:59


 


Respiratory Rate  20   12/05/18 08:59


 


Blood Pressure  135/89   12/05/18 08:59


 


O2 Sat by Pulse Oximetry (%)  96   12/04/18 21:00











Labs: 


 CBC, BMP





 12/05/18 06:00 





 12/05/18 06:00 





 INR, PTT











INR  1.31  (0.83-1.09)  H  12/03/18  06:15

## 2018-12-05 NOTE — DS
Physical Examination


Vital Signs: 


 Vital Signs











Temperature  98.0 F   12/05/18 18:30


 


Pulse Rate  87   12/05/18 18:30


 


Respiratory Rate  16   12/05/18 18:30


 


Blood Pressure  130/78   12/05/18 18:30


 


O2 Sat by Pulse Oximetry (%)  96   12/04/18 21:00











Labs: 


 CBC, BMP





 12/05/18 06:00 





 12/05/18 06:00 











Discharge Summary


Reason For Visit: LEFT UPPER QUADRANT PAIN


Current Active Problems





MARLEEN (acute kidney injury) (Acute)


Abdominal pain (Acute)


Abnormal biliary HIDA scan (Acute)


Acute cholecystitis (Acute)


Afib (Acute)


CKD (chronic kidney disease) (Acute)


Chest pain (Acute)


Demand ischemia (Acute)


HTN (hypertension) (Acute)


Hypokalemia (Acute)


LUQ abdominal pain (Acute)


Vomiting (Acute)








Condition: Improved





- Instructions


Diet, Activity, Other Instructions: 


low fat diet


see dr castro 1 week


Referrals: 


Harsh Del Real PA [Primary Care Provider] - 


Ashlie Solis DO [Staff Physician] - 


Chandu Castro MD [Staff Physician] - 


Disposition: HOME





- Home Medications


Comprehensive Discharge Medication List: 


Ambulatory Orders





Albuterol 0.083% Nebulizer Sol [Ventolin 0.083% Nebulizer Soln -] 1 amp NEB Q4H 

PRN #30 amp MDD 4 06/08/18 


Memantine HCl [Namenda -] 10 mg PO DAILY@0800 #30 tablet MDD 1 06/08/18 


Brimonidine Tartrate/Timolol [Combigan 0.2%-0.5% Eye Drops] 5 ml OP DAILY 08/14/ 18 


Exenatide Microspheres [Bydureon Pen] 2 mg SQ WEEKLY 08/14/18 


Methimazole [Tapazole -] 10 mg PO DAILY MDD 2 08/14/18 


Pantoprazole Sodium [Protonix] 40 mg PO DAILY 08/14/18 


Atorvastatin Ca [Lipitor] 20 mg PO HS #30 tablet 08/20/18 


Furosemide [Lasix -] 40 mg PO DAILY #30 tablet 08/20/18 


Insulin Glargine,Hum.rec.anlog [Lantus Solostar PEN -] 40 units SQ HS 11/26/18 


Metoprolol Succinate [Toprol XL -] 100 mg PO DAILY 11/26/18 


Omeprazole Magnesium [Acid Reducer] 40 mg PO DAILY 11/26/18 


Ondansetron HCl [Zofran] 4 mg PO TID PRN 11/26/18 


Apixaban [Eliquis -] 2.5 mg PO BID #20 tablet 11/28/18 


Losartan Potassium [Cozaar -] 25 mg PO DAILY #30 tablet 11/28/18

## 2018-12-05 NOTE — PN
Progress Note, Physician





- Current Medication List


Current Medications: 


Active Medications





Atorvastatin Calcium (Lipitor -)  20 mg PO HS [facility]


   Last Admin: 12/04/18 21:55 Dose:  20 mg


Piperacillin Sod/Tazobactam (Sod 3.375 gm/ Dextrose)  50 mls @ 100 mls/hr IVPB 

Q8H-IV [facility]; Protocol


   Last Admin: 12/05/18 10:12 Dose:  100 mls/hr


Dextrose/Sodium Chloride (D5-1/2ns -)  1,000 mls @ 42 mls/hr IV [facility]


   Last Admin: 12/04/18 21:55 Dose:  42 mls/hr


Insulin Aspart (Novolog Vial Sliding Scale -)  1 vial SQ TIDAC [facility]; Protocol


   Last Admin: 12/05/18 12:01 Dose:  Not Given


Memantine (Namenda -)  10 mg PO DAILY [facility]


   Last Admin: 12/05/18 15:01 Dose:  10 mg


Methimazole (Tapazole -)  10 mg PO DAILY [facility]


   Last Admin: 12/05/18 15:01 Dose:  10 mg


Metoprolol Succinate (Toprol Xl -)  25 mg PO DAILY [facility]


   Last Admin: 12/05/18 15:01 Dose:  25 mg


Morphine Sulfate (Morphine Sulfate)  1 mg IVPUSH Q4H PRN


   PRN Reason: PAIN LEVEL 4 -10


   Last Admin: 12/03/18 10:08 Dose:  1 mg











- Objective


Vital Signs: 


 Vital Signs











Temperature  97.6 F   12/05/18 14:46


 


Pulse Rate  88   12/05/18 14:46


 


Respiratory Rate  16   12/05/18 14:46


 


Blood Pressure  135/65   12/05/18 14:46


 


O2 Sat by Pulse Oximetry (%)  96   12/04/18 21:00











Constitutional: Yes: No Distress


HENT: Yes: Atraumatic


Neck: Yes: Supple


Cardiovascular: Yes: Regular Rate and Rhythm


Respiratory: Yes: CTA Bilaterally


Gastrointestinal: Yes: Normal Bowel Sounds


Extremities: Yes: WNL


Edema: No


Peripheral Pulses WNL: Yes


Neurological: Yes: Alert, Oriented


Labs: 


 CBC, BMP





 12/05/18 06:00 





 12/05/18 06:00 





 INR, PTT











INR  1.31  (0.83-1.09)  H  12/03/18  06:15    














Problem List





- Problems


(1) Chest pain


Code(s): R07.9 - CHEST PAIN, UNSPECIFIED   


Qualifiers: 


   Chest pain type: chest pain due to myocardial ischemia   Ischemic chest pain 

type: stable angina pectoris   Qualified Code(s): I20.8 - Other forms of angina 

pectoris   





(2) Diabetes


Code(s): E11.9 - TYPE 2 DIABETES MELLITUS WITHOUT COMPLICATIONS   


Qualifiers: 


   Diabetes mellitus type: type 2 





(3) Epigastric pain


Code(s): R10.13 - EPIGASTRIC PAIN   





(4) HLD (hyperlipidemia)


Assessment/Plan: 


on meds


Code(s): E78.5 - HYPERLIPIDEMIA, UNSPECIFIED   


Qualifiers: 


   Hyperlipidemia type: pure hypercholesterolemia   Qualified Code(s): E78.00 - 

Pure hypercholesterolemia, unspecified; E78.0 - Pure hypercholesterolemia   





(5) Hyperthyroidism


Assessment/Plan: 


on meds


stable


Code(s): E05.90 - THYROTOXICOSIS, UNSP WITHOUT THYROTOXIC CRISIS OR STORM   





(6) Obesity


Code(s): E66.9 - OBESITY, UNSPECIFIED   





(7) MARLEEN (acute kidney injury)


Code(s): N17.9 - ACUTE KIDNEY FAILURE, UNSPECIFIED   





(8) Demand ischemia


Code(s): I24.8 - OTHER FORMS OF ACUTE ISCHEMIC HEART DISEASE   





(9) HTN (hypertension)


Code(s): I10 - ESSENTIAL (PRIMARY) HYPERTENSION   


Qualifiers: 


   Hypertension type: essential hypertension   Qualified Code(s): I10 - 

Essential (primary) hypertension   





(10) Hypokalemia


Code(s): E87.6 - HYPOKALEMIA   





(11) Acute cholecystitis


Assessment/Plan: 


d/w dr alvares


patient can be dc on low fat diet 


he will schedule gall bladder surgery as out patient


Code(s): K81.0 - ACUTE CHOLECYSTITIS

## 2018-12-05 NOTE — PN
Progress Note (short form)





- Note


Progress Note: 


Denies any abd pain


Had abdominal discomfort after dinner last night


Awaiting Cholecystostomy





 Vital Signs











 Period  Temp  Pulse  Resp  BP Sys/Lennon  Pulse Ox


 


 Last 24 Hr  98.0 F-98.6 F  86-89  18-20  105-136/61-89  96








PE: AOx3


Neck: Supple, No JVD


HEENT: PERRL, EOMI


Lungs: CTA


CVs: S1S2


Abd: Benign


Ext: No edema


Neuro: No focal deficit





 CMP











Sodium  141 mmol/L (136-145)   12/05/18  06:00    


 


Potassium  3.6 mmol/L (3.5-5.1)   12/05/18  06:00    


 


Chloride  104 mmol/L ()   12/05/18  06:00    


 


Carbon Dioxide  29 mmol/L (21-32)   12/05/18  06:00    


 


Anion Gap  8 MMOL/L (8-16)   12/05/18  06:00    


 


BUN  7 mg/dL (7-18)   12/05/18  06:00    


 


Creatinine  0.8 mg/dL (0.55-1.3)   12/05/18  06:00    


 


Creat Clearance w eGFR  > 60  (>60)   12/05/18  06:00    


 


POC Glucometer  128 UNITS ()   12/05/18  12:00    


 


Random Glucose  118 mg/dL ()  H  12/05/18  06:00    


 


Lactic Acid  2.2 mmol/L (0.4-2.0)  H*  11/26/18  18:57    


 


Calcium  8.9 mg/dL (8.5-10.1)   12/05/18  06:00    


 


Magnesium  2.3 mg/dL (1.8-2.4)   11/26/18  16:30    


 


Total Bilirubin  1.1 mg/dL (0.2-1)  H  12/05/18  06:00    


 


AST  13 U/L (15-37)  L  12/05/18  06:00    


 


ALT  16 U/L (13-61)   12/05/18  06:00    


 


Alkaline Phosphatase  112 U/L ()   12/05/18  06:00    


 


Creatine Kinase  84 IU/L ()   11/27/18  20:00    


 


Troponin I  0.16 ng/ml (0.00-0.05)  H  11/27/18  20:00    


 


Total Protein  6.1 g/dl (6.4-8.2)  L  12/05/18  06:00    


 


Albumin  3.0 g/dl (3.4-5.0)  L  12/05/18  06:00    


 


Lipase  125 U/L ()   11/26/18  16:30    


 


TSH  0.01 uIU/ml (0.358-3.74)  L  12/05/18  06:00    


 


Free T4  1.60 ng/dl (0.76-1.46)  H  12/05/18  06:00    








 Current Medications











Generic Name Dose Route Start Last Admin





  Trade Name Freq  PRN Reason Stop Dose Admin


 


Atorvastatin Calcium  20 mg  12/03/18 22:00  12/04/18 21:55





  Lipitor -  PO   20 mg





  HS JULIEN   Administration





     





     





     





     


 


Piperacillin Sod/Tazobactam  50 mls @ 100 mls/hr  11/30/18 13:45  12/05/18 10:12





  Sod 3.375 gm/ Dextrose  IVPB   100 mls/hr





  Q8H-IV JULIEN   Administration





     





     





  Protocol   





     


 


Dextrose/Sodium Chloride  1,000 mls @ 42 mls/hr  12/02/18 17:30  12/04/18 21:55





  D5-1/2ns -  IV   42 mls/hr





  ASDIR JULIEN   Administration





     





     





     





     


 


Insulin Aspart  1 vial  12/05/18 07:00  12/05/18 12:01





  Novolog Vial Sliding Scale -  SQ   Not Given





  TIDAC JULIEN   





     





     





  Protocol   





     


 


Memantine  10 mg  12/03/18 20:45  12/05/18 12:03





  Namenda -  PO   Not Given





  DAILY JULIEN   





     





     





     





     


 


Methimazole  10 mg  12/03/18 20:45  12/05/18 12:03





  Tapazole -  PO   Not Given





  DAILY JULIEN   





     





     





     





     


 


Metoprolol Succinate  25 mg  12/03/18 17:15  12/05/18 12:03





  Toprol Xl -  PO   Not Given





  DAILY JULIEN   





     





     





     





     


 


Morphine Sulfate  1 mg  12/03/18 09:52  12/03/18 10:08





  Morphine Sulfate  IVPUSH   1 mg





  Q4H PRN   Administration





  PAIN LEVEL 4 -10   





     





     





     














AP:





T2DM:


BGM QACHS


NOvolg SS coverage


Will increase dose as necessary with increase in food intake





Cholecystitis: Awaiting Cholecsytostomy





Hyperthyroidism


TSH 0.01 FT4 1.6 FT3 and  TSI pending


Continue Methimazole 10mg QD





HTN





HLD





Will F/U

## 2018-12-06 VITALS — HEART RATE: 85 BPM | DIASTOLIC BLOOD PRESSURE: 59 MMHG | TEMPERATURE: 98.1 F | SYSTOLIC BLOOD PRESSURE: 111 MMHG

## 2018-12-06 RX ADMIN — METHIMAZOLE SCH MG: 10 TABLET ORAL at 11:06

## 2018-12-06 RX ADMIN — PIPERACILLIN SODIUM,TAZOBACTAM SODIUM SCH MLS/HR: 3; .375 INJECTION, POWDER, FOR SOLUTION INTRAVENOUS at 01:28

## 2018-12-06 RX ADMIN — PIPERACILLIN SODIUM,TAZOBACTAM SODIUM SCH MLS/HR: 3; .375 INJECTION, POWDER, FOR SOLUTION INTRAVENOUS at 11:04

## 2018-12-06 RX ADMIN — MEMANTINE SCH MG: 10 TABLET ORAL at 11:04

## 2018-12-06 RX ADMIN — INSULIN ASPART SCH UNITS: 100 INJECTION, SOLUTION INTRAVENOUS; SUBCUTANEOUS at 11:11

## 2018-12-06 RX ADMIN — PIPERACILLIN SODIUM,TAZOBACTAM SODIUM SCH MLS/HR: 3; .375 INJECTION, POWDER, FOR SOLUTION INTRAVENOUS at 01:30

## 2018-12-06 RX ADMIN — INSULIN ASPART SCH: 100 INJECTION, SOLUTION INTRAVENOUS; SUBCUTANEOUS at 06:34

## 2018-12-06 RX ADMIN — DEXTROSE AND SODIUM CHLORIDE SCH MLS/HR: 5; 450 INJECTION, SOLUTION INTRAVENOUS at 06:35

## 2018-12-06 NOTE — PN
Progress Note, Physician


History of Present Illness: 





stable


doing well


no issues


no more pain





- Current Medication List


Current Medications: 


Active Medications





Atorvastatin Calcium (Lipitor -)  20 mg PO HS [facility]


   Last Admin: 12/05/18 21:54 Dose:  20 mg


Piperacillin Sod/Tazobactam (Sod 3.375 gm/ Dextrose)  50 mls @ 100 mls/hr IVPB 

Q8H-IV JULIEN; Protocol


   Last Admin: 12/06/18 11:04 Dose:  100 mls/hr


Dextrose/Sodium Chloride (D5-1/2ns -)  1,000 mls @ 42 mls/hr IV ASDIR [facility]


   Last Admin: 12/06/18 06:35 Dose:  42 mls/hr


Insulin Aspart (Novolog Vial Sliding Scale -)  1 vial SQ TIDAC [facility]; Protocol


   Last Admin: 12/06/18 11:11 Dose:  2 units


Memantine (Namenda -)  10 mg PO DAILY [facility]


   Last Admin: 12/06/18 11:04 Dose:  10 mg


Methimazole (Tapazole -)  10 mg PO DAILY [facility]


   Last Admin: 12/06/18 11:06 Dose:  10 mg


Metoprolol Succinate (Toprol Xl -)  25 mg PO DAILY [facility]


   Last Admin: 12/06/18 11:04 Dose:  25 mg











- Objective


Vital Signs: 


 Vital Signs











Temperature  98.0 F   12/06/18 06:00


 


Pulse Rate  90   12/06/18 06:00


 


Respiratory Rate  18   12/06/18 06:00


 


Blood Pressure  123/62   12/06/18 06:00


 


O2 Sat by Pulse Oximetry (%)  97   12/05/18 21:00











Constitutional: Yes: No Distress, Calm


Cardiovascular: Yes: Regular Rate and Rhythm


Respiratory: Yes: Regular, CTA Bilaterally


Gastrointestinal: Yes: Normal Bowel Sounds, Soft


Musculoskeletal: Yes: WNL


Extremities: Yes: WNL


Neurological: Yes: Alert, Oriented


Psychiatric: Yes: Alert, Oriented


Labs: 


 CBC, BMP





 12/05/18 06:00 





 12/05/18 06:00 





 INR, PTT











INR  1.31  (0.83-1.09)  H  12/03/18  06:15    














Assessment/Plan





Problem List





- Problems


(1) Abnormal biliary HIDA scan


Code(s): R94.8 - ABNORMAL RESULTS OF FUNCTION STUDIES OF ORGANS AND SYSTEMS   





(2) Chest pain


Code(s): R07.9 - CHEST PAIN, UNSPECIFIED   


Qualifiers: 


   Chest pain type: chest pain due to myocardial ischemia   Ischemic chest pain 

type: stable angina pectoris   Qualified Code(s): I20.8 - Other forms of angina 

pectoris   





(3) Demand ischemia


Code(s): I24.8 - OTHER FORMS OF ACUTE ISCHEMIC HEART DISEASE   





(4) Acute on chronic systolic (congestive) heart failure


Code(s): I50.23 - ACUTE ON CHRONIC SYSTOLIC (CONGESTIVE) HEART FAILURE   





(5) Atrial flutter


Code(s): I48.92 - UNSPECIFIED ATRIAL FLUTTER   


Qualifiers: 


   Atrial flutter type: unspecified   Qualified Code(s): I48.92 - Unspecified 

atrial flutter   





(6) Obesity


Code(s): E66.9 - OBESITY, UNSPECIFIED   





plan


continue current mgmt


stop abx


rest continue current mgmt


surgery to follow

## 2018-12-06 NOTE — PN
Progress Note (short form)





- Note


Progress Note: 


Denies any abd pain


Tolerating food








  Vital Signs











 Period  Temp  Pulse  Resp  BP Sys/Lennon  Pulse Ox


 


 Last 24 Hr  97.6 F-98.2 F  70-90  16-18  110-135/62-78  97











PE: AOx3


Neck: Supple, No JVD


HEENT: PERRL, EOMI


Lungs: CTA


CVs: S1S2


Abd: Benign


Ext:Trace leg edema


Neuro: No focal deficit





  CMP











Sodium  141 mmol/L (136-145)   12/05/18  06:00    


 


Potassium  3.6 mmol/L (3.5-5.1)   12/05/18  06:00    


 


Chloride  104 mmol/L ()   12/05/18  06:00    


 


Carbon Dioxide  29 mmol/L (21-32)   12/05/18  06:00    


 


Anion Gap  8 MMOL/L (8-16)   12/05/18  06:00    


 


BUN  7 mg/dL (7-18)   12/05/18  06:00    


 


Creatinine  0.8 mg/dL (0.55-1.3)   12/05/18  06:00    


 


Creat Clearance w eGFR  > 60  (>60)   12/05/18  06:00    


 


POC Glucometer  122 UNITS ()   12/06/18  05:50    


 


Random Glucose  118 mg/dL ()  H  12/05/18  06:00    


 


Lactic Acid  2.2 mmol/L (0.4-2.0)  H*  11/26/18  18:57    


 


Calcium  8.9 mg/dL (8.5-10.1)   12/05/18  06:00    


 


Magnesium  2.3 mg/dL (1.8-2.4)   11/26/18  16:30    


 


Total Bilirubin  1.1 mg/dL (0.2-1)  H  12/05/18  06:00    


 


AST  13 U/L (15-37)  L  12/05/18  06:00    


 


ALT  16 U/L (13-61)   12/05/18  06:00    


 


Alkaline Phosphatase  112 U/L ()   12/05/18  06:00    


 


Creatine Kinase  84 IU/L ()   11/27/18  20:00    


 


Troponin I  0.16 ng/ml (0.00-0.05)  H  11/27/18  20:00    


 


Total Protein  6.1 g/dl (6.4-8.2)  L  12/05/18  06:00    


 


Albumin  3.0 g/dl (3.4-5.0)  L  12/05/18  06:00    


 


Lipase  125 U/L ()   11/26/18  16:30    


 


TSH  0.01 uIU/ml (0.358-3.74)  L  12/05/18  06:00    


 


Free T4  1.60 ng/dl (0.76-1.46)  H  12/05/18  06:00    


 


Free T3  4.0 pg/ml (2.0-4.4)   12/05/18  06:00    








 Current Medications











Generic Name Dose Route Start Last Admin





  Trade Name Freq  PRN Reason Stop Dose Admin


 


Atorvastatin Calcium  20 mg  12/03/18 22:00  12/05/18 21:54





  Lipitor -  PO   20 mg





  HS JULIEN   Administration





     





     





     





     


 


Piperacillin Sod/Tazobactam  50 mls @ 100 mls/hr  11/30/18 13:45  12/06/18 01:30





  Sod 3.375 gm/ Dextrose  IVPB   100 mls/hr





  Q8H-IV JULIEN   Administration





     





     





  Protocol   





     


 


Dextrose/Sodium Chloride  1,000 mls @ 42 mls/hr  12/02/18 17:30  12/06/18 06:35





  D5-1/2ns -  IV   42 mls/hr





  ASDIR JULIEN   Administration





     





     





     





     


 


Insulin Aspart  1 vial  12/05/18 07:00  12/06/18 06:34





  Novolog Vial Sliding Scale -  SQ   Not Given





  TIDAC JULIEN   





     





     





  Protocol   





     


 


Memantine  10 mg  12/03/18 20:45  12/05/18 15:01





  Namenda -  PO   10 mg





  DAILY JULIEN   Administration





     





     





     





     


 


Methimazole  10 mg  12/03/18 20:45  12/05/18 15:01





  Tapazole -  PO   10 mg





  DAILY JULIEN   Administration





     





     





     





     


 


Metoprolol Succinate  25 mg  12/03/18 17:15  12/05/18 15:01





  Toprol Xl -  PO   25 mg





  DAILY JULIEN   Administration





     





     





     





     


 


Morphine Sulfate  1 mg  12/03/18 09:52  12/03/18 10:08





  Morphine Sulfate  IVPUSH   1 mg





  Q4H PRN   Administration





  PAIN LEVEL 4 -10   





     





     





     

















AP:





T2DM:


BGM QACHS


NOvolg SS coverage


Will increase dose as necessary with increase in food intake


Pt should go home on Insulin sliding scale only for now. She should f/u with 

PCP as outpt and adjust dose as necessary.





Cholecystitis: Awaiting Cholecsytostomy





Hyperthyroidism


TSH 0.01 FT4 1.6 FT3 4.0 and  TSI pending


Continue Methimazole 10mg QD





HTN





HLD





Will F/U

## 2018-12-06 NOTE — PN
Progress Note, Physician


History of Present Illness: 


Percutaneous cholecystotomy placement deferred due to improved clinical status. 

HIDA scan suspicious for acute cholecystitis. RUQ discomfort improved, afebrile 

on clear liquids.





- Current Medication List


Current Medications: 


Active Medications





Atorvastatin Calcium (Lipitor -)  20 mg PO HS [facility]


   Last Admin: 12/05/18 21:54 Dose:  20 mg


Piperacillin Sod/Tazobactam (Sod 3.375 gm/ Dextrose)  50 mls @ 100 mls/hr IVPB 

Q8H-IV JULIEN; Protocol


   Last Admin: 12/06/18 01:30 Dose:  100 mls/hr


Dextrose/Sodium Chloride (D5-1/2ns -)  1,000 mls @ 42 mls/hr IV ASDIR [facility]


   Last Admin: 12/06/18 06:35 Dose:  42 mls/hr


Insulin Aspart (Novolog Vial Sliding Scale -)  1 vial SQ TIDAC [facility]; Protocol


   Last Admin: 12/06/18 06:34 Dose:  Not Given


Memantine (Namenda -)  10 mg PO DAILY [facility]


   Last Admin: 12/05/18 15:01 Dose:  10 mg


Methimazole (Tapazole -)  10 mg PO DAILY [facility]


   Last Admin: 12/05/18 15:01 Dose:  10 mg


Metoprolol Succinate (Toprol Xl -)  25 mg PO DAILY [facility]


   Last Admin: 12/05/18 15:01 Dose:  25 mg











- Objective


Vital Signs: 


 Vital Signs











Temperature  98.0 F   12/06/18 06:00


 


Pulse Rate  90   12/06/18 06:00


 


Respiratory Rate  18   12/06/18 06:00


 


Blood Pressure  123/62   12/06/18 06:00


 


O2 Sat by Pulse Oximetry (%)  97   12/05/18 21:00











Constitutional: Yes: No Distress, Calm


Neck: Yes: Supple


Cardiovascular: Yes: Regular Rate and Rhythm


Respiratory: Yes: Regular, CTA Bilaterally


Gastrointestinal: Yes: Normal Bowel Sounds, Soft


Edema: No


Labs: 


 CBC, BMP





 12/05/18 06:00 





 12/05/18 06:00 





 INR, PTT











INR  1.31  (0.83-1.09)  H  12/03/18  06:15    














Problem List





- Problems


(1) Demand ischemia


Code(s): I24.8 - OTHER FORMS OF ACUTE ISCHEMIC HEART DISEASE   





(2) HTN (hypertension)


Code(s): I10 - ESSENTIAL (PRIMARY) HYPERTENSION   


Qualifiers: 


   Hypertension type: essential hypertension   Qualified Code(s): I10 - 

Essential (primary) hypertension   





(3) Atrial flutter


Code(s): I48.92 - UNSPECIFIED ATRIAL FLUTTER   


Qualifiers: 


   Atrial flutter type: unspecified   Qualified Code(s): I48.92 - Unspecified 

atrial flutter   





(4) HLD (hyperlipidemia)


Code(s): E78.5 - HYPERLIPIDEMIA, UNSPECIFIED   


Qualifiers: 


   Hyperlipidemia type: pure hypercholesterolemia   Qualified Code(s): E78.00 - 

Pure hypercholesterolemia, unspecified; E78.0 - Pure hypercholesterolemia   





(5) Hyperthyroidism


Code(s): E05.90 - THYROTOXICOSIS, UNSP WITHOUT THYROTOXIC CRISIS OR STORM   





(6) Obesity


Code(s): E66.9 - OBESITY, UNSPECIFIED   





(7) Epigastric pain


Code(s): R10.13 - EPIGASTRIC PAIN   





Assessment/Plan


11/28/2018 Echo: Normal LV size and fxn, mild MR, AR, mod NH, severe TR





1. Chest pain syndrome with mild elevation of troponin referable to demand 

ischemia


2. Atrial fibrillation/flutter [id number] score likely 6 on NOAC


3. Epigastric / RUQ abdominal pain with cholelithiasis and positive HIDA scan c/

w acute cholecystitis


4. Acute on CKD (pre-renal) resolved


5. HTN


6. Type 2 DM


7. Hypercholesterolemia


8. Hyperthyroidism 


9. Nephrolithiasis








PLAN:


1. Hold Eliquis 5 mg BID (age > 80, wt>60 kg and Cr <1.5) 2 days prior to 

outpatient lap cholecystectomy and she may proceed from cardiac-standpoint


2. Continue Lipitor 20 mg QD, Losartan 25 mg QD and Toprol XL 25 mg QD as 

hemodynamics tolerate


3. Further strategy for AF to follow whether rate control or rhythm control


4. D/c planning

## 2018-12-06 NOTE — DS
Physical Examination


Vital Signs: 


 Vital Signs











Temperature  98.1 F   12/06/18 14:42


 


Pulse Rate  85   12/06/18 14:42


 


Respiratory Rate  16   12/06/18 14:42


 


Blood Pressure  111/59 L  12/06/18 14:42


 


O2 Sat by Pulse Oximetry (%)  98   12/06/18 11:00











Constitutional: Yes: No Distress


HENT: Yes: Atraumatic


Neck: Yes: Supple


Cardiovascular: Yes: Regular Rate and Rhythm


Respiratory: Yes: CTA Bilaterally


Gastrointestinal: Yes: Normal Bowel Sounds


Extremities: Yes: WNL


Edema: No


Neurological: Yes: Alert, Oriented


Labs: 


 CBC, BMP





 12/05/18 06:00 





 12/05/18 06:00 











Discharge Summary


Reason For Visit: LEFT UPPER QUADRANT PAIN


Condition: Improved





- Instructions


Diet, Activity, Other Instructions: 





Activity: Resume your usual activities gradually, but no heavy exertion or 

lifting more than 10-15 pounds for 4-6 weeks. LOW FAT DIET, NO PERNIL.  Eat 

lightly at first, but advance to your usual diet as tolerated.


 


Pain: For pain, you may use and alternate Tylenol (acetaminophen) 1-2 pills and/

or ibuprofen 200 mg (1-3 pills) every 6 hours each as needed; this means that 

you can take one OR the other at 3-hour intervals. If you are prescribed a 

Tylenol/narcotic combination for severe pain, use it instead of plain Tylenol 

as needed and switch back when your pain starts decreasing. Do not take more 

than 4000 mg of acetaminophen in a day. Take medications as prescribed or 

indicated on the labeling.


 


Follow-up: Call Dr. Castro' office at [phone] to make your postop 

appointment (Wednesday in approximately 4 weeks after surgery as advised). 

Clinic is held in the [facility] on the first floor of [facility].


 


Call the office if you have:


* increasing pain not responsive to pain medication


* fever of 101F or higher


* unusual or increasing bleeding or drainage from wounds


* increasing redness or swelling at wound sites


 


Also, see your primary medical doctor within 1-2 weeks.


Referrals: 


Harsh Del Real PA [Primary Care Provider] - 


Ashlie Solis DO [Staff Physician] - 


Chandu Castro MD [Staff Physician] - 


Disposition: HOME





- Home Medications


Comprehensive Discharge Medication List: 


Ambulatory Orders





Albuterol 0.083% Nebulizer Sol [Ventolin 0.083% Nebulizer Soln -] 1 amp NEB Q4H 

PRN #30 amp MDD 4 06/08/18 


Memantine HCl [Namenda -] 10 mg PO DAILY@0800 #30 tablet MDD 1 06/08/18 


Brimonidine Tartrate/Timolol [Combigan 0.2%-0.5% Eye Drops] 5 ml OP DAILY 08/14/ 18 


Exenatide Microspheres [Bydureon Pen] 2 mg SQ WEEKLY 08/14/18 


Methimazole [Tapazole -] 10 mg PO DAILY MDD 2 08/14/18 


Pantoprazole Sodium [Protonix] 40 mg PO DAILY 08/14/18 


Atorvastatin Ca [Lipitor] 20 mg PO HS #30 tablet 08/20/18 


Furosemide [Lasix -] 40 mg PO DAILY #30 tablet 08/20/18 


Metoprolol Succinate [Toprol XL -] 100 mg PO DAILY 11/26/18 


Omeprazole Magnesium [Acid Reducer] 40 mg PO DAILY 11/26/18 


Ondansetron HCl [Zofran] 4 mg PO TID PRN 11/26/18 


Apixaban [Eliquis -] 2.5 mg PO BID #20 tablet 11/28/18 


Losartan Potassium [Cozaar -] 25 mg PO DAILY #30 tablet 11/28/18 


Insulin Sliding Scale [Novolog Vial Sliding Scale -] 1 vial SQ TIDAC  units 12/ 06/18 





[facility]

## 2018-12-07 LAB — TSI ACT/NOR SER: <0.1 IU/L (ref 0–0.55)

## 2019-10-03 ENCOUNTER — HOSPITAL ENCOUNTER (INPATIENT)
Dept: HOSPITAL 74 - JER | Age: 84
LOS: 5 days | Discharge: HOME | DRG: 445 | End: 2019-10-08
Attending: INTERNAL MEDICINE | Admitting: INTERNAL MEDICINE
Payer: COMMERCIAL

## 2019-10-03 VITALS — BODY MASS INDEX: 32.1 KG/M2

## 2019-10-03 DIAGNOSIS — E11.22: ICD-10-CM

## 2019-10-03 DIAGNOSIS — I24.8: ICD-10-CM

## 2019-10-03 DIAGNOSIS — R10.11: ICD-10-CM

## 2019-10-03 DIAGNOSIS — K59.09: ICD-10-CM

## 2019-10-03 DIAGNOSIS — M54.5: ICD-10-CM

## 2019-10-03 DIAGNOSIS — D72.829: ICD-10-CM

## 2019-10-03 DIAGNOSIS — K80.00: Primary | ICD-10-CM

## 2019-10-03 DIAGNOSIS — N17.9: ICD-10-CM

## 2019-10-03 DIAGNOSIS — I48.21: ICD-10-CM

## 2019-10-03 DIAGNOSIS — I50.22: ICD-10-CM

## 2019-10-03 DIAGNOSIS — I11.0: ICD-10-CM

## 2019-10-03 DIAGNOSIS — N18.9: ICD-10-CM

## 2019-10-03 DIAGNOSIS — E78.5: ICD-10-CM

## 2019-10-03 DIAGNOSIS — E11.65: ICD-10-CM

## 2019-10-03 DIAGNOSIS — F03.90: ICD-10-CM

## 2019-10-03 DIAGNOSIS — K21.9: ICD-10-CM

## 2019-10-03 DIAGNOSIS — E03.9: ICD-10-CM

## 2019-10-03 DIAGNOSIS — E05.90: ICD-10-CM

## 2019-10-03 DIAGNOSIS — E87.2: ICD-10-CM

## 2019-10-03 DIAGNOSIS — I48.92: ICD-10-CM

## 2019-10-03 DIAGNOSIS — I13.0: ICD-10-CM

## 2019-10-03 DIAGNOSIS — E87.5: ICD-10-CM

## 2019-10-03 LAB
ALBUMIN SERPL-MCNC: 3.2 G/DL (ref 3.4–5)
ALP SERPL-CCNC: 199 U/L (ref 45–117)
ALT SERPL-CCNC: 29 U/L (ref 13–61)
ANION GAP SERPL CALC-SCNC: 8 MMOL/L (ref 8–16)
APPEARANCE UR: CLEAR
APTT BLD: 33.7 SECONDS (ref 25.2–36.5)
AST SERPL-CCNC: 20 U/L (ref 15–37)
BASOPHILS # BLD: 0.5 % (ref 0–2)
BILIRUB CONJ SERPL-MCNC: 0.4 MG/DL (ref 0–0.2)
BILIRUB SERPL-MCNC: 1.7 MG/DL (ref 0.2–1)
BILIRUB UR STRIP.AUTO-MCNC: NEGATIVE MG/DL
BUN SERPL-MCNC: 53.2 MG/DL (ref 7–18)
CALCIUM SERPL-MCNC: 9.4 MG/DL (ref 8.5–10.1)
CHLORIDE SERPL-SCNC: 101 MMOL/L (ref 98–107)
CO2 SERPL-SCNC: 29 MMOL/L (ref 21–32)
COLOR UR: YELLOW
CREAT SERPL-MCNC: 1.5 MG/DL (ref 0.55–1.3)
DEPRECATED RDW RBC AUTO: 16.6 % (ref 11.6–15.6)
EOSINOPHIL # BLD: 1.3 % (ref 0–4.5)
GLUCOSE SERPL-MCNC: 233 MG/DL (ref 74–106)
HCT VFR BLD CALC: 43.5 % (ref 32.4–45.2)
HGB BLD-MCNC: 14.3 GM/DL (ref 10.7–15.3)
INR BLD: 1.17 (ref 0.83–1.09)
KETONES UR QL STRIP: NEGATIVE
LEUKOCYTE ESTERASE UR QL STRIP.AUTO: NEGATIVE
LIPASE SERPL-CCNC: 59 U/L (ref 73–393)
LYMPHOCYTES # BLD: 18.5 % (ref 8–40)
MCH RBC QN AUTO: 26.8 PG (ref 25.7–33.7)
MCHC RBC AUTO-ENTMCNC: 32.8 G/DL (ref 32–36)
MCV RBC: 81.7 FL (ref 80–96)
MONOCYTES # BLD AUTO: 3.4 % (ref 3.8–10.2)
NEUTROPHILS # BLD: 76.3 % (ref 42.8–82.8)
NITRITE UR QL STRIP: NEGATIVE
PH UR: 5.5 [PH] (ref 5–8)
PLATELET # BLD AUTO: 207 K/MM3 (ref 134–434)
PMV BLD: 9.3 FL (ref 7.5–11.1)
POTASSIUM SERPLBLD-SCNC: 5.3 MMOL/L (ref 3.5–5.1)
PROT SERPL-MCNC: 6.6 G/DL (ref 6.4–8.2)
PROT UR QL STRIP: NEGATIVE
PROT UR QL STRIP: NEGATIVE
PT PNL PPP: 13.8 SEC (ref 9.7–13)
RBC # BLD AUTO: 5.33 M/MM3 (ref 3.6–5.2)
SODIUM SERPL-SCNC: 137 MMOL/L (ref 136–145)
SP GR UR: 1.01 (ref 1.01–1.03)
UROBILINOGEN UR STRIP-MCNC: 1 MG/DL (ref 0.2–1)
WBC # BLD AUTO: 10.5 K/MM3 (ref 4–10)

## 2019-10-03 PROCEDURE — A9537 TC99M MEBROFENIN: HCPCS

## 2019-10-03 RX ADMIN — SODIUM CHLORIDE SCH MLS/HR: 9 INJECTION, SOLUTION INTRAVENOUS at 21:03

## 2019-10-03 RX ADMIN — SACUBITRIL AND VALSARTAN SCH: 24; 26 TABLET, FILM COATED ORAL at 23:57

## 2019-10-03 NOTE — EKG
Test Reason : 

Blood Pressure : ***/*** mmHG

Vent. Rate : 104 BPM     Atrial Rate : 113 BPM

   P-R Int : 000 ms          QRS Dur : 086 ms

    QT Int : 316 ms       P-R-T Axes : 000 -37 131 degrees

   QTc Int : 415 ms

 

ATRIAL FIBRILLATION WITH RAPID VENTRICULAR RESPONSE WITH PREMATURE

VENTRICULAR OR ABERRANTLY CONDUCTED COMPLEXES

LEFT AXIS DEVIATION

LOW VOLTAGE QRS

CANNOT RULE OUT ANTEROSEPTAL INFARCT (CITED ON OR BEFORE 23-FEB-2018)

ABNORMAL ECG

WHEN COMPARED WITH ECG OF 26-NOV-2018 18:34,

ATRIAL FIBRILLATION HAS REPLACED ATRIAL FLUTTER

VENT. RATE HAS INCREASED BY  48 BPM

CRITERIA FOR INFERIOR INFARCT ARE NO LONGER PRESENT

Confirmed by CASSIUS BENITEZ, AMANDA ([id number]) on 10/3/2019 2:29:23 PM

 

Referred By:             Confirmed By:AMANDA HAMMONDS MD

## 2019-10-03 NOTE — PN
Teaching Attending Note


Name of Resident: Lo Gross





ATTENDING PHYSICIAN STATEMENT





I saw and evaluated the patient.


I reviewed the resident's note and discussed the case with the resident.


I agree with the resident's findings and plan as documented.








SUBJECTIVE:


84 yo woman w/ HTN, HLD, IDDM, GERD, afib/flutter, CHF c/o right upper quadrant 

pain for several days, worse after eating. Last night c/o nausea and mutlipel 

episodes of vomiting. Patient did nor complain of significant chest pain or 

shortness of breath. Was assessed here for on prior visit and noted to be 

scheduled for cholecystectomy however not performed. 

















OBJECTIVE:


 Last Vital Signs











Temp Pulse Resp BP Pulse Ox


 


 98.5 F   78   20   134/81   97 


 


 10/03/19 20:14  10/03/19 20:14  10/03/19 20:14  10/03/19 20:14  10/03/19 20:14








gen - appears comfortable, not in distress 


heent -at, nc 


neck supple 


abdomen -soft, benign, bs+, right upper quadrant tenderness, no rebound 

tenderness 























 Abnormal Lab Results











  10/03/19 10/03/19 10/03/19





  14:10 14:10 14:10


 


WBC   10.5 H 


 


RBC   5.33 H 


 


RDW   16.6 H 


 


Absolute Neuts (auto)   8.1 H 


 


Monocytes %   3.4 L 


 


PT with INR  13.80 H  


 


INR  1.17 H  


 


Potassium    5.3 H


 


BUN    53.2 H


 


Creatinine    1.5 H


 


Random Glucose    233 H


 


Lactic Acid   


 


Total Bilirubin    1.7 H


 


Direct Bilirubin   


 


Alkaline Phosphatase    199 H


 


Troponin I    0.08 H


 


Albumin    3.2 L


 


Lipase    59 L














  10/03/19 10/03/19





  17:30 21:00


 


WBC  


 


RBC  


 


RDW  


 


Absolute Neuts (auto)  


 


Monocytes %  


 


PT with INR  


 


INR  


 


Potassium  


 


BUN  


 


Creatinine  


 


Random Glucose  


 


Lactic Acid  2.1 H 


 


Total Bilirubin  


 


Direct Bilirubin   0.4 H


 


Alkaline Phosphatase  


 


Troponin I   0.08 H


 


Albumin  


 


Lipase  








imaging reviewed 





ASSESSMENT AND PLAN:


84yo woman with recurrent RUQ abdominal pain which seems cholestatic in nature, 

no overt cholecytitis on ruq ultrasound but clinical picture suggestive of 

cholecysitis with possible cholangitis  +bilirubinemia and high ALT c/w 

cholecstatic picture. As per documentation pt was scheduled for outpatient 

cholecytectomy but never performed. Pt with history of afib with High CVADSVACS 

score- however currently not on any anticoagulation, unclear why. Need to 

clarify with PCP why is no longer on anticoagulation. Noted to have chronically 

elevated troponin, however in light of new ST depressions on leads V5, V6 will 

admit to telemetry and monitor troponins although clinical suspicion for ACS at 

this time is low. 


Uncontrolled DM with severe hyperglycemia


#Mild lactic acidosis


#MARLEEN


#Mild leukocytosis 


-telemetry 


-npo 


-surgery consult 


-iv fluid hydration 


-avoid nephrotoxins 


-contact pcp why not on AC 


-trend trops 


-monitor vs closely 


-tight glucose control w/ sc insulin 


-dvt ppx

## 2019-10-03 NOTE — PDOC
Attending Attestation





- Resident


Resident Name: Areli Sheikh





- HPI


HPI: 





10/03/19 16:18


Pt presents to the Ed with multiple complaints, including diffuse abdominal 

pain and pain in all of her extremities.  Denies fever, nausea or vomiting, 

chest pain or shortness of breath. 





- Physicial Exam


PE: 





10/03/19 16:24


Agree with resident exam.  patient is alert and oriented and in no acute 

distress.  Abdomen is soft, non distended and non tender to deep palpation on 

my exam. 





- Medical Decision Making





10/03/19 16:25


Pt presents to the ED with multiple complaints.  Abdomen is non tender on my 

exam.  Labs show slightly elevated troponin.  RUQ US checked to rule out 

cholecystitis, and shows cholelithiasis but no inflammation.  Will admit to 

medicine for serial troponins.

## 2019-10-03 NOTE — PDOC
History of Present Illness





- General


Chief Complaint: Nausea/Vomiting


Stated Complaint: VOMITING/DIZZINES


Time Seen by Provider: 10/03/19 13:50


History Source: Patient, Family (granddaughter)


Exam Limitations: Language Barrier (Algerian speaking only)





- History of Present Illness


Initial Comments: 


10/03/19 14:17





82 yo F PMH cholelithiasis, HTN, HLD, IDDM, GERD, afib/flutter on Eliquis, 

systolic CHF, hysterectomy, presenting with RUQ pain. States that she began to 

have this pain last night, associated with nausea and 3 episodes of vomiting 

between last night and this morning. Also complains of dizziness "like the room 

is spinning" and less sensation in L side of face, arms, and legs, however, 

these have apparently been recurring issues. Further complains of chronic 

constipation, arthritis, and tingling in hands and feet. Denies current nausea, 

but states that her RUQ pain is ongoing. Feels like it radiates from RUQ to LUQ

, similar to prior pain she has had.





Denies CP, SOB, diarrhea, fevers/chills, HA.








Past History





- Past Medical History


Allergies/Adverse Reactions: 


 Allergies











Allergy/AdvReac Type Severity Reaction Status Date / Time


 


No Known Allergies Allergy   Verified 11/26/18 20:09











Home Medications: 


Ambulatory Orders





Digoxin [Lanoxin -] 0.125 mg PO DAILY 10/03/19 


Furosemide [Lasix] 20 mg PO DAILY 10/03/19 


Metoprolol Succinate [Toprol Xl] 50 mg PO DAILY 10/03/19 


Sacubitril/Valsartan [Entresto 24 mg-26 mg Tablet] 1 each PO BID 10/03/19 


Tramadol HCl/Acetaminophen [Tramadol-Acetaminophn 37.5-325] 1 each PO QID PRN 10

/03/19 








Asthma: Yes


Cardiac Disorders: Yes (AFIB)


COPD: No


CHF: Yes


Dementia: Yes


Diabetes: Yes (Type 2 DM)


HTN: Yes


Hypercholesterolemia: Yes


Thyroid Disease: Yes (hypothyroidism)





- Immunization History


Immunization Up to Date: Yes





- Psycho Social/Smoking Cessation Hx


Smoking Status: No


Smoking History: Never smoked


Have you smoked in the past 12 months: No


Number of Cigarettes Smoked Daily: 0


If you are a former smoker, when did you quit?: 60years ago


Information on smoking cessation initiated: No


Hx Alcohol Use: No


Drug/Substance Use Hx: No


Substance Use Type: None


Hx Substance Use Treatment: No





**Review of Systems





- Review of Systems


Constitutional: No: Chills, Diaphoresis, Fever, Weakness


HEENTM: No: Blurred Vision, Hearing Loss, Difficulty Swallowing


Respiratory: No: Cough, Orthopnea, Shortness of Breath


Cardiac (ROS): Yes: Irregular Heart Rate (known history of afib/flutter).  No: 

Chest Pain


ABD/GI: Yes: Constipated (chronic), Nausea, Vomiting.  No: Abdominal Distended, 

Diarrhea


: No: Burning, Dysuria, Discharge, Frequency, Flank Pain, Hematuria


Musculoskeletal: Yes: Joint Swelling (hx arthritis), Joint Stiffness (arthritis)

.  No: Back Pain


Neurological: Yes: Numbness (L sided, on and off for multiple months), 

Paresthesia, Dizziness ("room spinning").  No: Headache, Weakness





*Physical Exam





- Vital Signs


 Last Vital Signs











Temp Pulse Resp BP Pulse Ox


 


 97.6 F   62   16   121/68   96 


 


 10/03/19 13:30  10/03/19 13:30  10/03/19 13:30  10/03/19 13:30  10/03/19 13:30














- Physical Exam


Comments: 


10/03/19 14:23





Gen: NAD, well-developed, well-nourished


Neuro: AAOX4, CN II-XII intact w/o facial droop, no nystagmus, sensation 

subjectively decreased in L side of face, L arm, L leg, 5/5 strength in all 

extremities


HEENT: atraumatic, normocephalic


Neck: trachea midline, supple


CV: irregular, regular rate


Pulm: CTA b/l, no wheezing


Abd: soft, non-distended, mild ttp in RUQ and LUQ, no CVA tenderness, no 

suprapubic tenderness


MSK: full ROM, 2+ pulses


Skin: warm, dry


Extr: no edema, no deformities








ED Treatment Course





- LABORATORY


CBC & Chemistry Diagram: 


 10/03/19 14:10





 10/03/19 14:10





- RADIOLOGY


Radiology Studies Ordered: 














 Category Date Time Status


 


 HEAD CT WITHOUT CONTRAST [CT] Stat CT Scan  10/03/19 14:05 Ordered


 


 ABDOMEN US [US] Stat Ultrasound  10/03/19 14:14 Ordered














Medical Decision Making





- Medical Decision Making


10/03/19 14:27





82 yo F with multiple complaints. Concerning for cholelithiasis v cholecystitis 

v vertigo v UTI v ACS v old stroke.


- CBC, CMP, lipase, coags


- EKG, trop


- CT head non con


- UA/UC


- Ofirmev, no current nausea so will hold off on Zofran


- RUQ US


- ctm





10/03/19 14:43


 bpm, afib





10/03/19 15:28


CT head with mild periventricular chronic microvascular issues, no acute 

infarcts. Trop 0.08, history of elevated trop in November 2018.





10/03/19 15:44


Abdomen US shows hepatomegaly, cholelithiasis, no acute cholecystitis or AAA.





10/03/19 17:03


UA unconcerning. Will f/u lactic acid, however, will admit for troponinemia.





10/03/19 18:27


Lactate 2.1.








Discharge





- Discharge Information


Problems reviewed: Yes


Clinical Impression/Diagnosis: 


 Troponin level elevated





Condition: Stable





- Admission


Yes





- Follow up/Referral





- Patient Discharge Instructions





- Post Discharge Activity

## 2019-10-03 NOTE — HP
CHIEF COMPLAINT:





PCP:  Dr. Del Real (GI Dr. Bond) 





HISTORY OF PRESENT ILLNESS:


82 y/o/f with PMHx of HTN, HLD, IDDM, GERD, afib/flutter, CHF here for abd pain

, vomiting, and dizziness. Patient states she has had chronic abd pain for over 

a year. She has a history of gallstones but has been told she is not a 

candidate for a cholecystectomy due to her medical history. Over the last two 

days she has been vomiting and has been feeling dizzy. She has vomited 3 times, 

no blood or bile in the vomit. She describes the dizzyness as the room 

spinning. Her abd pain is epigastric and radiates to her chest and left flank. 

She complains of occasional SOB, none now. She has left shoulder/scapular pain 

that is chronic. She has a history of constipation, last BM 3 days ago. Her BMs 

are small and she takes laxatives without significant improvement. She denies 

any fever, chills, dysuria, hematochezia. Patient was previously on eliquis for 

afib/flutter but this was discontinued by her cardiologist last month. Patient 

complains of chronic bilateral leg pain that radiates from her back.   





ER course was notable for:


(1) CXR negative, CT head negative, RUQ U/S - small calculi and biliary sludge 

with no evidence of duct dilation. no evidence of acute cholecystitis 


(2) elevated trop at 0.08


(3) EKG without ST elevations





Recent Travel:





PAST MEDICAL HISTORY:


HTN, HLD, IDDM, GERD, afib/flutter on eliquis, systolic CHF





PAST SURGICAL HISTORY:


total hysterectomy





Social History:


Smoking: smoked cigars in the past


Alcohol: denies


Drugs: denies


Lives with daughter and granddaughter 





FamHx: diabetes 





Allergies





No Known Allergies Allergy (Verified 18 20:09)


 








HOME MEDICATIONS:


REVIEW OF SYSTEMS


Constitutional: weakness, denies loss of appetite, fever


HEENT: denies sore throat, vision changes, congestion


Cardio: chest pain, denies palpitations


Resp: SOB, denies wheezing 


GI: abd pain, nausea, vomiting, constipation denies diarrhea


MSK: leg pain, left shoulder/scapular pain. denies back pain, joint pain, neck 

pain


SKIN: denies rashes


Neuro: dizzyness (room spinning) denies incontinence, loss of consciousness, 

numbness, tingling, headache


Psych: denies anxiety








PHYSICAL EXAMINATION





Completed orthostatic BP 


supine: 116/78


sittin/70


standin/70


 Vital Signs - 24 hr











  10/03/19 10/03/19 10/03/19





  13:30 15:40 20:14


 


Temperature 97.6 F 98.6 F 98.5 F


 


Pulse Rate 62  


 


Pulse Rate [  88 78





Left Radial]   


 


Respiratory 16 19 20





Rate   


 


Blood Pressure 121/68  


 


Blood Pressure  128/76 134/81





[Right Arm]   


 


O2 Sat by Pulse 96 98 97





Oximetry (%)   











GENERAL: mild distress, Awake, alert, and fully oriented


HEAD: NC/AT


EYES: PERRL, EOMI, sclera anicteric, conjunctiva clear


EARS, NOSE, THROAT: nares patent. dry mucous membranes.


NECK: Normal range of motion, supple without lymphadenopathy, JVD, or masses.


LUNGS: Breath sounds equal, clear to auscultation bilaterally. No wheezes, and 

no crackles. No accessory muscle use.


HEART: Regular rate and rhythm, normal S1 and S2 without murmur, rub or gallop.


ABDOMEN: tenderness to palpation over epigastrium and LUQ, negative murphys 

sign. Soft, not distended, normoactive bowel sounds, no guarding, no rebound, 

no masses.  


MUSCULOSKELETAL: No midline tenderness to palpation, No CVA tenderness.


UPPER EXTREMITIES: 2+ pulses, warm, well-perfused. No cyanosis. No clubbing. No 

peripheral edema.


LOWER EXTREMITIES: 2+ pulses, warm, well-perfused. No calf tenderness. No 

peripheral edema. 


NEUROLOGICAL:  positive straight leg raise test on right leg. Normal speech. 

Normal gait. 5/5 strength upper and lower extremities


PSYCHIATRIC: Cooperative. Good eye contact. Appropriate mood and affect.


SKIN: Warm, dry, normal turgor, no rashes or lesions noted, normal capillary 

refill. 





Completed orthostatic BP - negative 


 Laboratory Results - last 24 hr











  10/03/19 10/03/19 10/03/19





  14:10 14:10 14:10


 


WBC   10.5 H 


 


RBC   5.33 H 


 


Hgb   14.3 


 


Hct   43.5 


 


MCV   81.7 


 


MCH   26.8 


 


MCHC   32.8 


 


RDW   16.6 H 


 


Plt Count   207 


 


MPV   9.3  D 


 


Absolute Neuts (auto)   8.1 H 


 


Neutrophils %   76.3  D 


 


Lymphocytes %   18.5  D 


 


Monocytes %   3.4 L 


 


Eosinophils %   1.3 


 


Basophils %   0.5 


 


Nucleated RBC %   0 


 


PT with INR  13.80 H  


 


INR  1.17 H  


 


PTT (Actin FS)  33.7  


 


Sodium    137


 


Potassium    5.3 H


 


Chloride    101


 


Carbon Dioxide    29


 


Anion Gap    8


 


BUN    53.2 H


 


Creatinine    1.5 H


 


Est GFR (CKD-EPI)AfAm    36.95


 


Est GFR (CKD-EPI)NonAf    31.88


 


Random Glucose    233 H


 


Lactic Acid   


 


Calcium    9.4


 


Total Bilirubin    1.7 H


 


AST    20


 


ALT    29


 


Alkaline Phosphatase    199 H


 


Troponin I    0.08 H


 


Total Protein    6.6


 


Albumin    3.2 L


 


Lipase    59 L


 


Urine Color   


 


Urine Appearance   


 


Urine pH   


 


Ur Specific Gravity   


 


Urine Protein   


 


Urine Glucose (UA)   


 


Urine Ketones   


 


Urine Blood   


 


Urine Nitrite   


 


Urine Bilirubin   


 


Urine Urobilinogen   


 


Ur Leukocyte Esterase   














  10/03/19 10/03/19





  16:40 17:30


 


WBC  


 


RBC  


 


Hgb  


 


Hct  


 


MCV  


 


MCH  


 


MCHC  


 


RDW  


 


Plt Count  


 


MPV  


 


Absolute Neuts (auto)  


 


Neutrophils %  


 


Lymphocytes %  


 


Monocytes %  


 


Eosinophils %  


 


Basophils %  


 


Nucleated RBC %  


 


PT with INR  


 


INR  


 


PTT (Actin FS)  


 


Sodium  


 


Potassium  


 


Chloride  


 


Carbon Dioxide  


 


Anion Gap  


 


BUN  


 


Creatinine  


 


Est GFR (CKD-EPI)AfAm  


 


Est GFR (CKD-EPI)NonAf  


 


Random Glucose  


 


Lactic Acid   2.1 H


 


Calcium  


 


Total Bilirubin  


 


AST  


 


ALT  


 


Alkaline Phosphatase  


 


Troponin I  


 


Total Protein  


 


Albumin  


 


Lipase  


 


Urine Color  Yellow 


 


Urine Appearance  Clear 


 


Urine pH  5.5 


 


Ur Specific Gravity  1.015 


 


Urine Protein  Negative 


 


Urine Glucose (UA)  Negative 


 


Urine Ketones  Negative 


 


Urine Blood  Negative 


 


Urine Nitrite  Negative 


 


Urine Bilirubin  Negative 


 


Urine Urobilinogen  1.0 


 


Ur Leukocyte Esterase  Negative 








Imaging: 


CXR - large heart, slight right hilar prominence. no adverse changes compared 

to study from 2018.


Head CT - no acute intracranial pathology identified. moderate volume loss and 

mild periventricular chronic microvascular ischemic disease changes. 


Abdomen U/S - gallbladder is normal in size and does contain small calculi and 

biliary sludge. there is no evidence of intra or extrahepatic biliary duct 

dilatation. there is no sonographic evidence of cholecystitis.





ASSESSMENT/PLAN:


82 y/o/f with PMHx of HTN, HLD, IDDM, GERD, afib/flutter, CHF here for abd pain

, vomiting, and dizziness.





1)Cholecystitis - patient with history of gallstones, HIDA scan in the past 

with features suggestive of cholecystitis -> cholecytostomy tube placed by Dr. Castro with improvement of symptoms. 


-RUQ U/S showing small calculi and biliary sludge without evidence of duct 

dilation 


-Elevated T-bili and alk phos. fractionated bilirubin pending


-Surgery, Dr. Castro consulted 


-MRCP ordered


   -MRCP not completed due to patient noncompliance. shows only layering sludge 

and/or microlithiasis in the gallbladder. 


-repeat lactic, BMP ordered


   -repeat lactic at 1.3. BMP similar to initial labs but showing normalization 

of values 





2)Elevated troponin - patient has history of elevated troponins in the past, 

cardiac history of afib/flutter and CHF


-initial and second trop both at 0.08


-contact PCP/cardiologist to clarify why patient is no longer on 

anticoagulation 


-Holding heparin drip as patient has flat troponin trend, anticoagulation held 

by cardiologist/PCP for unknown reason 





3)MARLEEN - BUN/Cr at 55.2/1.5 - likely pre renal as patient has been vomiting and 

has had poor oral intake


-IVF 


-monitor with repeat labs





4)Hyperkalemia - 5.3


-IVF fluids


-monitor with repeat labs


-RESOLVED





5)DM - patient on insulin at home


-Insulin sliding scale


-BGM





6)Prophylaxis


-SCDs





7)FEN


-Diabetic diet


-NS @ 100mls/hr





8)Disposition


-admitted to tele 





Visit type





- Emergency Visit


Emergency Visit: Yes


ED Registration Date: 10/03/19


Care time: The patient presented to the Emergency Department on the above date 

and was hospitalized for further evaluation of their emergent condition.





- New Patient


This patient is new to me today: Yes


Date on this admission: 10/04/19





- Critical Care


Critical Care patient: No





ATTENDING PHYSICIAN STATEMENT





I saw and evaluated the patient.


I reviewed the resident's note and discussed the case with the resident.


I agree with the resident's findings and plan as documented.








SUBJECTIVE:








OBJECTIVE:








ASSESSMENT AND PLAN:

## 2019-10-04 LAB
ALBUMIN SERPL-MCNC: 2.9 G/DL (ref 3.4–5)
ALP SERPL-CCNC: 171 U/L (ref 45–117)
ALT SERPL-CCNC: 25 U/L (ref 13–61)
ANION GAP SERPL CALC-SCNC: 6 MMOL/L (ref 8–16)
ANION GAP SERPL CALC-SCNC: 7 MMOL/L (ref 8–16)
AST SERPL-CCNC: 20 U/L (ref 15–37)
BILIRUB CONJ SERPL-MCNC: 0.4 MG/DL (ref 0–0.2)
BILIRUB SERPL-MCNC: 1.7 MG/DL (ref 0.2–1)
BUN SERPL-MCNC: 46.4 MG/DL (ref 7–18)
BUN SERPL-MCNC: 52.8 MG/DL (ref 7–18)
CALCIUM SERPL-MCNC: 8.7 MG/DL (ref 8.5–10.1)
CALCIUM SERPL-MCNC: 8.7 MG/DL (ref 8.5–10.1)
CHLORIDE SERPL-SCNC: 106 MMOL/L (ref 98–107)
CHLORIDE SERPL-SCNC: 108 MMOL/L (ref 98–107)
CO2 SERPL-SCNC: 25 MMOL/L (ref 21–32)
CO2 SERPL-SCNC: 26 MMOL/L (ref 21–32)
CREAT SERPL-MCNC: 1.1 MG/DL (ref 0.55–1.3)
CREAT SERPL-MCNC: 1.3 MG/DL (ref 0.55–1.3)
DEPRECATED RDW RBC AUTO: 16.7 % (ref 11.6–15.6)
GLUCOSE SERPL-MCNC: 160 MG/DL (ref 74–106)
GLUCOSE SERPL-MCNC: 161 MG/DL (ref 74–106)
HCT VFR BLD CALC: 39.5 % (ref 32.4–45.2)
HGB BLD-MCNC: 13 GM/DL (ref 10.7–15.3)
MCH RBC QN AUTO: 26.9 PG (ref 25.7–33.7)
MCHC RBC AUTO-ENTMCNC: 32.8 G/DL (ref 32–36)
MCV RBC: 81.8 FL (ref 80–96)
PLATELET # BLD AUTO: 162 K/MM3 (ref 134–434)
PMV BLD: 8.3 FL (ref 7.5–11.1)
POTASSIUM SERPLBLD-SCNC: 4.6 MMOL/L (ref 3.5–5.1)
POTASSIUM SERPLBLD-SCNC: 4.7 MMOL/L (ref 3.5–5.1)
PROT SERPL-MCNC: 5.8 G/DL (ref 6.4–8.2)
RBC # BLD AUTO: 4.83 M/MM3 (ref 3.6–5.2)
SODIUM SERPL-SCNC: 138 MMOL/L (ref 136–145)
SODIUM SERPL-SCNC: 139 MMOL/L (ref 136–145)
WBC # BLD AUTO: 8.4 K/MM3 (ref 4–10)

## 2019-10-04 RX ADMIN — SACUBITRIL AND VALSARTAN SCH TAB: 24; 26 TABLET, FILM COATED ORAL at 22:13

## 2019-10-04 RX ADMIN — SACUBITRIL AND VALSARTAN SCH TAB: 24; 26 TABLET, FILM COATED ORAL at 11:55

## 2019-10-04 RX ADMIN — INSULIN ASPART SCH: 100 INJECTION, SOLUTION INTRAVENOUS; SUBCUTANEOUS at 17:38

## 2019-10-04 RX ADMIN — DIGOXIN SCH MG: 125 TABLET ORAL at 10:30

## 2019-10-04 RX ADMIN — INSULIN ASPART SCH: 100 INJECTION, SOLUTION INTRAVENOUS; SUBCUTANEOUS at 06:45

## 2019-10-04 RX ADMIN — SODIUM CHLORIDE SCH: 9 INJECTION, SOLUTION INTRAVENOUS at 22:14

## 2019-10-04 NOTE — PN
Progress Note (short form)





- Note


Progress Note: 





HPI: Briefly 82yo Eritrean-speaking F with h/o HTN, HLD, HFrEF, Afib, DM who 

presents to the hospital originally due to pain in her abdomen located mid-

epigastric with R>L described as a dull pain. Pt reports it is not truly 

associated with eating, however she does notice moreso after food. Pt reports 

she has never been nauseous or vomiting because of this pain. She reports she 

is being seen by her PCP due to this pain and nothing has seemed to help much. 

While in the ED she received studies demonstrating biliary sludging without any 

cholelithiasis and an elevated troponin 0.08. Pt is sitting comfortably and 

reports being hungry. Pt denies any fevers/chills, cough, weight loss, 

shortness of breath, chest pain, palptiations, dysuria, polyuria. Her last BM 

was 3 days prior and she also endorses chronic kknee pain worse at night with 

an active day.





 Vital Signs











Temperature  97.3 F L  10/04/19 09:00


 


Pulse Rate  78   10/04/19 10:30


 


Respiratory Rate  18   10/04/19 09:00


 


Blood Pressure  163/88   10/04/19 09:00


 


O2 Sat by Pulse Oximetry (%)  93 L  10/04/19 09:00








GEN: NAD, awake, alert, oriented x3


HEENT: Nc/AT, EOMI, ART, sclera anicteric, MMM


Neck: No JVD


LUNG: CTA b/l without any rales or wheezes. No accesory muscle use. On RA


CARD: Irregularly irregular rhythm with normal rate. No murmurs


ABD: Soft, normoactive BS, nondistended, no TTP reported, no guarding, no 

rebound


EXT: No edema





 CBC, BMP





 10/04/19 05:25 





 10/04/19 05:25 





 Hepatic Panel











Total Bilirubin  1.7 mg/dL (0.2-1)  H  10/04/19  05:25    


 


Direct Bilirubin  0.4 mg/dL (0.0-0.2)  H  10/04/19  05:25    


 


AST  20 U/L (15-37)   10/04/19  05:25    


 


ALT  25 U/L (13-61)   10/04/19  05:25    


 


Alkaline Phosphatase  171 U/L ()  H  10/04/19  05:25    


 


Albumin  2.9 g/dl (3.4-5.0)  L  10/04/19  05:25    








Active Medications





Apixaban (Eliquis -)  5 mg PO BID [facility]


Digoxin (Lanoxin -)  0.125 mg PO DAILY [facility]


   Last Admin: 10/04/19 10:30 Dose:  0.125 mg


Sodium Chloride (Normal Saline -)  1,000 mls @ 100 mls/hr IV ASDIR [facility]


   Last Admin: 10/03/19 21:03 Dose:  100 mls/hr


Insulin Aspart (Novolog Vial Sliding Scale -)  1 vial SQ BIDAC [facility]; Protocol


   Last Admin: 10/04/19 06:45 Dose:  Not Given


Metoprolol Succinate (Toprol Xl -)  50 mg PO DAILY [facility]


   Last Admin: 10/04/19 10:30 Dose:  50 mg


Sacubitril/Valsartan (Entresto 24 Mg-26 Mg Tablet)  1 tab PO BID [facility]


   Last Admin: 10/03/19 23:57 Dose:  Not Given





A/P


Biliary Colic


Elevated troponin


Atrial fibrillation


Uncontrolled diabetes mellitus


HTN


HLD





--Spoke with Dr. Leyva (PCP) pt was on Eliquis 5mg PO BID and was never 

told to stop. He actually re-prescribed it on 9/23/19 appointment. In addition 

pt did not receive Methimazole from him at any point. He reports her pain has 

been chronic in the outpatient setting and he does not know of any surgical 

consult before for potential cholecystectomy. 





--awaiting MRCP result


--Surgical consult has been placed and awaiting recommendations re: elective 

lap jorge outpatient vs. inpatient, however must await MRCP


--TB has uptrended from previous values, however stable currently


--RUQ showing biliary sludging likely cholestatic picture


--Restarted Eliquis 5mg PO BID due to elevated CHADS score for tonight and can 

hold for any anticipated procedure


--Continue rate control with Toprol XL 50mg qdaily


--Continue Entresto 24-26 BID PO


--Elevated troponin likely demand; continue to trend


--ECG without changes from previous





FEN:


   Fluids: NS@100cc/hr


   Electrolyte abnormalities: None


   Nutrition: NPO while awaiting recs/for MRCP





PPX:


   DVT - Eliquis for tonight if no procedure





Dispo:


Case discussed

## 2019-10-04 NOTE — PN
Teaching Attending Note


Name of Resident: Burak Calderon





ATTENDING PHYSICIAN STATEMENT





I saw and evaluated the patient.


I reviewed the resident's note and discussed the case with the resident.


I agree with the resident's findings and plan as documented.








SUBJECTIVE: Patient complains of abdominal pain.








OBJECTIVE:


 Vital Signs











 Period  Temp  Pulse  Resp  BP Sys/Lennon  Pulse Ox


 


 Last 24 Hr  97.3 F-98.6 F  62-89  16-20  117-163/66-88  93-98








HEART: Irregularly irregular


LUNGS: Clear


ABDOMEN: Obese, soft, non-distended, (+) RUQ tenderness, normal BS


EXTREMITIES: No edema





 Laboratory Results - last 24 hr











  10/03/19 10/03/19 10/03/19





  14:10 14:10 14:10


 


WBC   10.5 H 


 


RBC   5.33 H 


 


Hgb   14.3 


 


Hct   43.5 


 


MCV   81.7 


 


MCH   26.8 


 


MCHC   32.8 


 


RDW   16.6 H 


 


Plt Count   207 


 


MPV   9.3  D 


 


Absolute Neuts (auto)   8.1 H 


 


Neutrophils %   76.3  D 


 


Lymphocytes %   18.5  D 


 


Monocytes %   3.4 L 


 


Eosinophils %   1.3 


 


Basophils %   0.5 


 


Nucleated RBC %   0 


 


PT with INR  13.80 H  


 


INR  1.17 H  


 


PTT (Actin FS)  33.7  


 


Sodium    137


 


Potassium    5.3 H


 


Chloride    101


 


Carbon Dioxide    29


 


Anion Gap    8


 


BUN    53.2 H


 


Creatinine    1.5 H


 


Est GFR (CKD-EPI)AfAm    36.95


 


Est GFR (CKD-EPI)NonAf    31.88


 


POC Glucometer   


 


Random Glucose    233 H


 


Hemoglobin A1c %   


 


Lactic Acid   


 


Calcium    9.4


 


Total Bilirubin    1.7 H


 


Direct Bilirubin   


 


AST    20


 


ALT    29


 


Alkaline Phosphatase    199 H


 


Creatine Kinase   


 


Troponin I    0.08 H


 


Total Protein    6.6


 


Albumin    3.2 L


 


Lipase    59 L


 


TSH   


 


Free T4   


 


Urine Color   


 


Urine Appearance   


 


Urine pH   


 


Ur Specific Gravity   


 


Urine Protein   


 


Urine Glucose (UA)   


 


Urine Ketones   


 


Urine Blood   


 


Urine Nitrite   


 


Urine Bilirubin   


 


Urine Urobilinogen   


 


Ur Leukocyte Esterase   


 


Digoxin   














  10/03/19 10/03/19 10/03/19





  16:40 17:30 20:57


 


WBC   


 


RBC   


 


Hgb   


 


Hct   


 


MCV   


 


MCH   


 


MCHC   


 


RDW   


 


Plt Count   


 


MPV   


 


Absolute Neuts (auto)   


 


Neutrophils %   


 


Lymphocytes %   


 


Monocytes %   


 


Eosinophils %   


 


Basophils %   


 


Nucleated RBC %   


 


PT with INR   


 


INR   


 


PTT (Actin FS)   


 


Sodium   


 


Potassium   


 


Chloride   


 


Carbon Dioxide   


 


Anion Gap   


 


BUN   


 


Creatinine   


 


Est GFR (CKD-EPI)AfAm   


 


Est GFR (CKD-EPI)NonAf   


 


POC Glucometer    268


 


Random Glucose   


 


Hemoglobin A1c %   


 


Lactic Acid   2.1 H 


 


Calcium   


 


Total Bilirubin   


 


Direct Bilirubin   


 


AST   


 


ALT   


 


Alkaline Phosphatase   


 


Creatine Kinase   


 


Troponin I   


 


Total Protein   


 


Albumin   


 


Lipase   


 


TSH   


 


Free T4   


 


Urine Color  Yellow  


 


Urine Appearance  Clear  


 


Urine pH  5.5  


 


Ur Specific Gravity  1.015  


 


Urine Protein  Negative  


 


Urine Glucose (UA)  Negative  


 


Urine Ketones  Negative  


 


Urine Blood  Negative  


 


Urine Nitrite  Negative  


 


Urine Bilirubin  Negative  


 


Urine Urobilinogen  1.0  


 


Ur Leukocyte Esterase  Negative  


 


Digoxin   














  10/03/19 10/04/19 10/04/19





  21:00 00:32 00:32


 


WBC   


 


RBC   


 


Hgb   


 


Hct   


 


MCV   


 


MCH   


 


MCHC   


 


RDW   


 


Plt Count   


 


MPV   


 


Absolute Neuts (auto)   


 


Neutrophils %   


 


Lymphocytes %   


 


Monocytes %   


 


Eosinophils %   


 


Basophils %   


 


Nucleated RBC %   


 


PT with INR   


 


INR   


 


PTT (Actin FS)   


 


Sodium   138 


 


Potassium   4.6 


 


Chloride   106 


 


Carbon Dioxide   25 


 


Anion Gap   7 L 


 


BUN   52.8 H 


 


Creatinine   1.3 


 


Est GFR (CKD-EPI)AfAm   43.93 


 


Est GFR (CKD-EPI)NonAf   37.91 


 


POC Glucometer   


 


Random Glucose   160 H 


 


Hemoglobin A1c %   


 


Lactic Acid    1.4


 


Calcium   8.7 


 


Total Bilirubin   


 


Direct Bilirubin  0.4 H  


 


AST   


 


ALT   


 


Alkaline Phosphatase   


 


Creatine Kinase  41  


 


Troponin I  0.08 H  


 


Total Protein   


 


Albumin   


 


Lipase   


 


TSH   


 


Free T4   


 


Urine Color   


 


Urine Appearance   


 


Urine pH   


 


Ur Specific Gravity   


 


Urine Protein   


 


Urine Glucose (UA)   


 


Urine Ketones   


 


Urine Blood   


 


Urine Nitrite   


 


Urine Bilirubin   


 


Urine Urobilinogen   


 


Ur Leukocyte Esterase   


 


Digoxin   














  10/04/19 10/04/19 10/04/19





  05:25 05:25 05:25


 


WBC  8.4  


 


RBC  4.83  


 


Hgb  13.0  


 


Hct  39.5  


 


MCV  81.8  


 


MCH  26.9  


 


MCHC  32.8  


 


RDW  16.7 H  


 


Plt Count  162  D  


 


MPV  8.3  D  


 


Absolute Neuts (auto)   


 


Neutrophils %   


 


Lymphocytes %   


 


Monocytes %   


 


Eosinophils %   


 


Basophils %   


 


Nucleated RBC %   


 


PT with INR   


 


INR   


 


PTT (Actin FS)   


 


Sodium   139 


 


Potassium   4.7 


 


Chloride   108 H 


 


Carbon Dioxide   26 


 


Anion Gap   6 L 


 


BUN   46.4 H 


 


Creatinine   1.1 


 


Est GFR (CKD-EPI)AfAm   53.77 


 


Est GFR (CKD-EPI)NonAf   46.39 


 


POC Glucometer   


 


Random Glucose   161 H 


 


Hemoglobin A1c %   


 


Lactic Acid   


 


Calcium   8.7 


 


Total Bilirubin   1.7 H 


 


Direct Bilirubin   0.4 H 


 


AST   20 


 


ALT   25 


 


Alkaline Phosphatase   171 H 


 


Creatine Kinase   


 


Troponin I    0.08 H


 


Total Protein   5.8 L 


 


Albumin   2.9 L 


 


Lipase   


 


TSH   0.09 L 


 


Free T4   1.50 H 


 


Urine Color   


 


Urine Appearance   


 


Urine pH   


 


Ur Specific Gravity   


 


Urine Protein   


 


Urine Glucose (UA)   


 


Urine Ketones   


 


Urine Blood   


 


Urine Nitrite   


 


Urine Bilirubin   


 


Urine Urobilinogen   


 


Ur Leukocyte Esterase   


 


Digoxin   














  10/04/19 10/04/19 10/04/19





  05:25 05:25 06:42


 


WBC   


 


RBC   


 


Hgb   


 


Hct   


 


MCV   


 


MCH   


 


MCHC   


 


RDW   


 


Plt Count   


 


MPV   


 


Absolute Neuts (auto)   


 


Neutrophils %   


 


Lymphocytes %   


 


Monocytes %   


 


Eosinophils %   


 


Basophils %   


 


Nucleated RBC %   


 


PT with INR   


 


INR   


 


PTT (Actin FS)   


 


Sodium   


 


Potassium   


 


Chloride   


 


Carbon Dioxide   


 


Anion Gap   


 


BUN   


 


Creatinine   


 


Est GFR (CKD-EPI)AfAm   


 


Est GFR (CKD-EPI)NonAf   


 


POC Glucometer    147


 


Random Glucose   


 


Hemoglobin A1c %   12.8 H 


 


Lactic Acid   


 


Calcium   


 


Total Bilirubin   


 


Direct Bilirubin   


 


AST   


 


ALT   


 


Alkaline Phosphatase   


 


Creatine Kinase   


 


Troponin I   


 


Total Protein   


 


Albumin   


 


Lipase   


 


TSH   


 


Free T4   


 


Urine Color   


 


Urine Appearance   


 


Urine pH   


 


Ur Specific Gravity   


 


Urine Protein   


 


Urine Glucose (UA)   


 


Urine Ketones   


 


Urine Blood   


 


Urine Nitrite   


 


Urine Bilirubin   


 


Urine Urobilinogen   


 


Ur Leukocyte Esterase   


 


Digoxin  1.07  








 Current Medications











Generic Name Dose Route Start Last Admin





  Trade Name Freq  PRN Reason Stop Dose Admin


 


Digoxin  0.125 mg  10/04/19 10:00  





  Lanoxin -  PO   





  [facility]   





     





     





     





     


 


Sodium Chloride  1,000 mls @ 100 mls/hr  10/03/19 20:45  10/03/19 21:03





  Normal Saline -  IV   100 mls/hr





  [facility]   Administration





     





     





     





     


 


Insulin Aspart  1 vial  10/04/19 07:00  10/04/19 06:45





  Novolog Vial Sliding Scale -  SQ   Not Given





  [facility]   





     





     





  Protocol   





     


 


Metoprolol Succinate  50 mg  10/04/19 10:00  





  Toprol Xl -  PO   





  DAILY [facility]   





     





     





     





     


 


Sacubitril/Valsartan  1 tab  10/03/19 22:00  10/03/19 23:57





  Entresto 24 Mg-26 Mg Tablet  PO   Not Given





  [facility]   





     





     





     





     














ASSESSMENT AND PLAN:


This is an 83 year old woman with a history of HTN, hyperlipidemia, type 2 DM, 

GERD, atrial fib/flutter, chronic systolic heart failure, hyperthyroidism who 

presented to the ED with abdominal pain, vomiting, and dizziness.





1. Symptomatic cholelithiasis, possible acute vs chronic cholecystitis


   - Patient was here 11/2018 with acute cholecystitis and was thought to be a 

poor surgical candidate at that time. She was managed conservativey with IV 

antibiotics and improved. Cholecystostomy was not done at the time because she 

improved. She was advised to follow up with surgery for an interval 

cholecystectomy.


   - Surgery evaluation


2. Elevated troponin


   - Possibly demand ischemia


3. Acute kidney injury


   - Improving with IV fluid


4. Hyperkalemia


   - Improved with IV fluid


5. Permanent atrial fibrillation


   - Rate controlled


   - Continue Toprol XL, Digoxin, Eliquis


6. Chronic systolic heart failure


   - Continue Entresto


7. HTN


   - Continue Toprol XL, valsartan


8. Hyperlipidemia


9. Hyperthyroidism


   - Was on methimazole last year - will restart


10. Type 2 DM 


   - Continue Novolog sliding scale


11. DVT prophylaxis


   - On Eliquis

## 2019-10-04 NOTE — EKG
Test Reason : 

Blood Pressure : ***/*** mmHG

Vent. Rate : 077 BPM     Atrial Rate : 208 BPM

   P-R Int : 000 ms          QRS Dur : 090 ms

    QT Int : 352 ms       P-R-T Axes : 000 -33 105 degrees

   QTc Int : 398 ms

 

ATRIAL FIBRILLATION

LEFT AXIS DEVIATION

ANTEROSEPTAL INFARCT (CITED ON OR BEFORE 23-FEB-2018)

NONSPECIFIC ST ABNORMALITY

ABNORMAL ECG

 

Confirmed by NELL ALCOCER MD ([id number]) on 10/4/2019 12:07:04 PM

 

Referred By: NOREEN PRADO DR           Confirmed By:NELL ALCOCER MD

## 2019-10-05 RX ADMIN — INSULIN ASPART SCH UNITS: 100 INJECTION, SOLUTION INTRAVENOUS; SUBCUTANEOUS at 17:25

## 2019-10-05 RX ADMIN — INSULIN ASPART SCH: 100 INJECTION, SOLUTION INTRAVENOUS; SUBCUTANEOUS at 11:40

## 2019-10-05 RX ADMIN — SACUBITRIL AND VALSARTAN SCH TAB: 24; 26 TABLET, FILM COATED ORAL at 12:29

## 2019-10-05 RX ADMIN — INSULIN ASPART SCH: 100 INJECTION, SOLUTION INTRAVENOUS; SUBCUTANEOUS at 06:07

## 2019-10-05 RX ADMIN — ENOXAPARIN SODIUM SCH MG: 80 INJECTION SUBCUTANEOUS at 23:02

## 2019-10-05 RX ADMIN — ENOXAPARIN SODIUM SCH MG: 80 INJECTION SUBCUTANEOUS at 12:29

## 2019-10-05 RX ADMIN — INSULIN ASPART SCH: 100 INJECTION, SOLUTION INTRAVENOUS; SUBCUTANEOUS at 23:07

## 2019-10-05 RX ADMIN — METHIMAZOLE SCH MG: 10 TABLET ORAL at 10:37

## 2019-10-05 RX ADMIN — SODIUM CHLORIDE SCH: 9 INJECTION, SOLUTION INTRAVENOUS at 23:08

## 2019-10-05 RX ADMIN — DIGOXIN SCH MG: 125 TABLET ORAL at 10:37

## 2019-10-05 RX ADMIN — SODIUM CHLORIDE SCH MLS/HR: 9 INJECTION, SOLUTION INTRAVENOUS at 16:33

## 2019-10-05 RX ADMIN — SACUBITRIL AND VALSARTAN SCH TAB: 24; 26 TABLET, FILM COATED ORAL at 23:07

## 2019-10-05 NOTE — PN
Physical Exam: 


SUBJECTIVE: Patient seen and examined. Abdominal pain is less today.








OBJECTIVE:





 Vital Signs











 Period  Temp  Pulse  Resp  BP Sys/Lennon  Pulse Ox


 


 Last 24 Hr  98 F-98.3 F  68-90  20-20  108-132/62-81  95











GENERAL: The patient is awake, alert, and fully oriented, in no acute distress.


LUNGS: Breath sounds equal, clear to auscultation bilaterally, no wheezes, no 

crackles, no accessory muscle use. 


HEART: Irregularly irregular without murmur, rub or gallop.


ABDOMEN: Obese, soft, nontender, nondistended, normoactive bowel sounds, no 

guarding, no rebound, no hepatosplenomegaly, no masses.


EXTREMITIES: 2+ pulses, warm, well-perfused, no edema. 








 Laboratory Results - last 24 hr











  10/04/19 10/04/19 10/05/19





  12:27 17:36 06:05


 


POC Glucometer   103  72


 


Troponin I  0.08 H  








Active Medications











Generic Name Dose Route Start Last Admin





  Trade Name Freq  PRN Reason Stop Dose Admin


 


Digoxin  0.125 mg  10/04/19 10:00  10/04/19 10:30





  Lanoxin -  PO   0.125 mg





  [facility]ILY [facility]   Administration





     





     





     





     


 


Sodium Chloride  1,000 mls @ 100 mls/hr  10/03/19 20:45  10/04/19 22:14





  Normal Saline -  IV   Not Given





  [facility]   





     





     





     





     


 


Insulin Aspart  1 vial  10/05/19 11:00  





  Novolog Vial Sliding Scale -  SQ   





  [facility]   





     





     





  Protocol   





     


 


Methimazole  10 mg  10/05/19 10:00  





  Tapazole -  PO   





  [facility]   





     





     





     





     


 


Metoprolol Succinate  50 mg  10/04/19 10:00  10/04/19 10:30





  Toprol Xl -  PO   50 mg





  [facility]   Administration





     





     





     





     


 


Sacubitril/Valsartan  1 tab  10/03/19 22:00  10/04/19 22:13





  Entresto 24 Mg-26 Mg Tablet  PO   1 tab





  [facility]   Administration





     





     





     





     











ASSESSMENT/PLAN:


This is an 83 year old woman with a history of HTN, hyperlipidemia, type 2 DM, 

GERD, atrial fib/flutter, chronic systolic heart failure, hyperthyroidism who 

presented to the ED with abdominal pain, vomiting, and dizziness.





1. Symptomatic cholelithiasis, possible acute vs chronic cholecystitis


   - Patient was here 11/2018 with acute cholecystitis and was thought to be a 

poor surgical candidate at that time. She was managed conservativey with IV 

antibiotics and improved. Cholecystostomy was not done at the time because she 

improved. She was advised to follow up with surgery for an interval 

cholecystectomy.


   - Surgery evaluation


   - Afebrile, pain is improving


   - Start clear liquids


2. Elevated troponin


   - Possibly demand ischemia


3. Acute kidney injury


   - Improving with IV fluid


4. Hyperkalemia


   - Improved with IV fluid


5. Permanent atrial fibrillation


   - Rate controlled


   - Continue Toprol XL, Digoxin


   - On Eliquis at home - will change to Lovenox in case a procedure is required


6. Chronic systolic heart failure


   - Stable


   - Continue Entresto


7. HTN


   - Continue Toprol XL, valsartan


8. Hyperlipidemia


9. Hyperthyroidism


   - Continue methimazole


10. Type 2 DM 


   - Continue Novolog sliding scale


11. DVT prophylaxis


   - Eliquis changed to Lovenox





Visit type





- Emergency Visit


Emergency Visit: Yes


ED Registration Date: 10/03/19


Care time: The patient presented to the Emergency Department on the above date 

and was hospitalized for further evaluation of their emergent condition.





- New Patient


This patient is new to me today: No





- Critical Care


Critical Care patient: No





- Discharge Referral


Referred to [facility] No

## 2019-10-05 NOTE — EKG
Test Reason : 

Blood Pressure : ***/*** mmHG

Vent. Rate : 094 BPM     Atrial Rate : 288 BPM

   P-R Int : 000 ms          QRS Dur : 088 ms

    QT Int : 334 ms       P-R-T Axes : 000 -33 143 degrees

   QTc Int : 417 ms

 

ATRIAL FIBRILLATION

LEFT AXIS DEVIATION

ANTERIOR INFARCT (CITED ON OR BEFORE 23-FEB-2018)

ABNORMAL ECG

WHEN COMPARED WITH ECG OF 03-OCT-2019 13:37,

NO SIGNIFICANT CHANGE WAS FOUND

Confirmed by ENLL ALCOCER MD ([id number]) on 10/5/2019 10:02:43 AM

 

Referred By:             Confirmed By:NELL ALCOCER MD

## 2019-10-05 NOTE — CONSULT
Consult


Consult Specialty:: General Surgery


Referred by:: GEORGES Ventura


Reason for Consultation:: abd pain, cholelithiasis





- History of Present Illness


Chief Complaint: RUQ pain radiating to left, leg pains, N/V, constipation


History of Present Illness: 





84yo F with multiple medical problems, including HTN, HLD, afib on eliquis, CHF 

on Entresto, COPD, DM2 poorly controlled, hyperthyroidism, and h/o 

cholelithiasis with acute cholecystitis by HIDA scan 12/18 (known to our 

practice from in-hospital consultation then by Dr. Castro), who presented to ER 

2 days ago with RUQ pain radiating to left, associated with N/V Wednesday night 

into Thursday, no F/C, constipation (no BM for 5 days), and other somatic 

complaints including left arm pain, bilateral leg pains, and dizziness/weakness 

(feeling like she might fall down). She denied chest pain, but does get SOB 

when walking or exerting herself. She uses a walker at home, and lives with a 

daughter, who gives her her medications. In the ER, she had wbc 10.5, elevated 

bili at 1.7 (direct 0.4) and alk phos, normal ALT/AST/lipase, elevated BUN/Cr, 

and hyperglycemia. US showed few calculi and biliary sludge with no signs of 

cholecystitis, fatty liver; MRCP was also done but with respiratory motion 

artifact rendering it nondiagnostic - showed layering sludge in gallbladder, no 

clear ductal dilation, + pericardial effusion, atrophic pancreas with possible 

trace edema vs artifact, possible trace pericholecystic fluid but no wall 

thickening. She was admitted to medicine and given IV fluids, and surgery was 

asked to assess.





Her pain has improved since admission, and today she was allowed clear liquids, 

which she is tolerating. Additional labs show persistent hyperthyroidism and Hb 

A1C of 12.8. Lactate was initially just over 2, down to 1.4 with hydration. WBC 

normal at 8 yesterday. She had been seen last December with acute cholecystitis 

and deemed a poor surgical candidate. She was going to have cholecystostomy, 

but symptoms improved with antibiotics and fluids, so was ultimately discharged 

with low-fat diet recommendation to f/u for possible outpatient surgery after 

medical optimization. Per her daughter (who facilitated Gambian at the patient'

s bedside), the patient's doctors including cardiologist told them she was not 

a good candidate for surgery, so they never saw the surgeon. She has had 

abdominal pain on and off ever since, along with other chronic pains and 

complaints, but last week, it got bad with the vomiting, so she came in. The 

pain is similar to what it felt like last December.





She has not been on antibiotics. She is drinking regular ginger ale. The 

daughter she lives with says she doesn't give her much sugar at home, but 

sometimes brown sugar, and sometimes honey in her tea. Her sugars at home do 

run high.





The patient is seen and examined sitting up in bed, with daughters present. She 

appears comfortable, and indicates her pain is better than before. 





- History Source


History Provided By: Patient, Family Member (daughter at bedside)


Limitations to Obtaining History: Language Barrier (Gambian - daughter 

translated at pt's request)





- Past Medical History


CNS: Yes: Dementia (early? - daughter says more forgetful recently and 

sometimes confused)


Cardio/Vascular: Yes: AFIB, CHF, HTN, Hyperlipdemia


Pulmonary: Yes: COPD


Gastrointestinal: Yes: GERD


Hepatobiliary: Yes: Cholelithiasis, Cholecystitis (12/18)


Reproductive: Yes: Postmenopausal


Musculoskeletal: Yes: Chronic low back pain, Osteoarthritis


Endocrine: Yes: Diabetes Mellitus, Hyperthyroidism





- Past Surgical History


Past Surgical History: Yes: Hernia Repair (abdominal wall), Hysterectomy





- Alcohol/Substance Use


Hx Alcohol Use: Yes (rarely/special occasions)


History of Substance Use: reports: None





- Smoking History


Smoking history: Former smoker


Have you smoked in the past 12 months: No


If you are a former smoker, when did you quit?: 20 years ago





- Social History


Usual Living Arrangement: With Child (daughter)


ADL: Family Assistance





Home Medications





- Allergies


Allergies/Adverse Reactions: 


 Allergies











Allergy/AdvReac Type Severity Reaction Status Date / Time


 


No Known Allergies Allergy   Verified 11/26/18 20:09














- Home Medications


Home Medications: 


Ambulatory Orders





Digoxin [Lanoxin -] 0.125 mg PO DAILY 10/03/19 


Furosemide [Lasix] 20 mg PO DAILY 10/03/19 


Metoprolol Succinate [Toprol Xl] 50 mg PO DAILY 10/03/19 


Sacubitril/Valsartan [Entresto 24 mg-26 mg Tablet] 1 each PO BID 10/03/19 


Tramadol HCl/Acetaminophen [Tramadol-Acetaminophn 37.5-325] 1 each PO QID PRN 10

/03/19 








Home Medications (free text): daughter will bring in accurate current list - 

believed to be also on eliquis and tapazole ??





Family Medical History


Family Hx Diabetes: Daughter





Review of Systems





- Review of Systems


Constitutional: reports: Weakness.  denies: Chills, Fever


Eyes: denies: Blurred Vision, Recent Change in Vision


HENT: reports: Hearing Loss.  denies: Difficult Swallowing, Throat Pain


Neck: denies: Swollen Glands, Tenderness


Cardiovascular: denies: Chest Pain, Palpitations


Respiratory: reports: Cough (chronic), SOB on Exertion.  denies: SOB


Gastrointestinal: reports: Abdominal Pain (with hpi), Constipation (no BM for 5 

days at least), Nausea (with hpi), Vomiting (with hpi).  denies: Diarrhea


Genitourinary: denies: Burning, Dysuria


Musculoskeletal: reports: Back Pain, Extremity Pain (left arm), Joint Pain (

knees).  denies: Muscle Pain


Integumentary: denies: Change in Color, Rash


Neurological: reports: Dizziness (feeling like might fall down), Headache (

sometimes), Unsteady Gait (uses walker at home)


Endocrine: reports: Other (always feels cold)





Physical Exam


Vital Signs: 


 Vital Signs











Temperature  98.0 F   10/05/19 14:54


 


Pulse Rate  75   10/05/19 14:54


 


Respiratory Rate  18   10/05/19 14:54


 


Blood Pressure  127/73   10/05/19 14:54


 


O2 Sat by Pulse Oximetry (%)  99   10/05/19 09:00











Constitutional: Yes: No Distress, Calm, Obese


Eyes: Yes: Conjunctiva Clear, EOM Intact.  No: Sclera Icterus


HENT: Yes: Atraumatic, Normocephalic


Neck: Yes: Supple, Trachea Midline


Cardiovascular: Yes: Pulse Irregular.  No: Bradycardia, Tachycardia


Respiratory: Yes: Regular, CTA Bilaterally


Gastrointestinal: Yes: Normal Bowel Sounds, Soft, Abdomen, Obese, Tenderness (

LLQ more than RLQ, RUQ, mild, no lamont or guarding), Other (healed short 

Pfannenstiel scar, healed midline/left paramedian scar).  No: Tenderness, 

Epigastrium


...Rectal Exam: Yes: Deferred


Renal/: Yes: CVA Tenderness - Left.  No: CVA Tenderness - Right


Musculoskeletal: No: Joint Stiffness, Joint Swelling


Extremities: Yes: Cool (hands).  No: Cold, Cyanosis


Edema: Yes


Edema: LLE: Trace, RLE: Trace


Peripheral Pulses WNL: Yes (irregular, present)


Integumentary: No: Jaundice, Rash


Neurological: Yes: Alert, Oriented


Psychiatric: Yes: Alert, Oriented


Labs: 


 CBC, BMP





 10/04/19 05:25 





 10/04/19 05:25 





 CMP











Sodium  139 mmol/L (136-145)   10/04/19  05:25    


 


Potassium  4.7 mmol/L (3.5-5.1)   10/04/19  05:25    


 


Chloride  108 mmol/L ()  H  10/04/19  05:25    


 


Carbon Dioxide  26 mmol/L (21-32)   10/04/19  05:25    


 


Anion Gap  6 MMOL/L (8-16)  L  10/04/19  05:25    


 


BUN  46.4 mg/dL (7-18)  H  10/04/19  05:25    


 


Creatinine  1.1 mg/dL (0.55-1.3)   10/04/19  05:25    


 


Est GFR (CKD-EPI)AfAm  53.77   10/04/19  05:25    


 


Est GFR (CKD-EPI)NonAf  46.39   10/04/19  05:25    


 


POC Glucometer  235 UNITS ()   10/05/19  17:21    


 


Random Glucose  161 mg/dL ()  H  10/04/19  05:25    


 


Hemoglobin A1c %  12.8 % (4.2-6.3)  H  10/04/19  05:25    


 


Lactic Acid  1.4 mmol/L (0.4-2.0)   10/04/19  00:32    


 


Calcium  8.7 mg/dL (8.5-10.1)   10/04/19  05:25    


 


Total Bilirubin  1.7 mg/dL (0.2-1)  H  10/04/19  05:25    


 


Direct Bilirubin  0.4 mg/dL (0.0-0.2)  H  10/04/19  05:25    


 


AST  20 U/L (15-37)   10/04/19  05:25    


 


ALT  25 U/L (13-61)   10/04/19  05:25    


 


Alkaline Phosphatase  171 U/L ()  H  10/04/19  05:25    


 


Creatine Kinase  41 U/L ()   10/03/19  21:00    


 


Troponin I  0.08 ng/ml (0.00-0.05)  H  10/04/19  12:27    


 


Total Protein  5.8 g/dl (6.4-8.2)  L  10/04/19  05:25    


 


Albumin  2.9 g/dl (3.4-5.0)  L  10/04/19  05:25    


 


Lipase  59 U/L ()  L  10/03/19  14:10    


 


TSH  0.09 uIU/ml (0.358-3.74)  L  10/04/19  05:25    


 


Free T4  1.50 ng/dl (0.76-1.46)  H  10/04/19  05:25    








wbc down from 10.5


baseline BUN by [employer] in 20s or less


Hb A1C very high


bili and alk phos remain elevated - no new labs today


still hyperthyroid, not as much as last year





 INR, PTT











INR  1.17  (0.83-1.09)  H  10/03/19  14:10    








 Urine Test Results











Urine Color  Yellow   10/03/19  16:40    


 


Urine Appearance  Clear   10/03/19  16:40    


 


Urine pH  5.5  (5.0-8.0)   10/03/19  16:40    


 


Ur Specific Gravity  1.015  (1.010-1.035)   10/03/19  16:40    


 


Urine Protein  Negative  (NEGATIVE)   10/03/19  16:40    


 


Urine Glucose (UA)  Negative  (NEGATIVE)   10/03/19  16:40    


 


Urine Ketones  Negative  (NEGATIVE)   10/03/19  16:40    


 


Urine Blood  Negative  (NEGATIVE)   10/03/19  16:40    


 


Urine Nitrite  Negative  (NEGATIVE)   10/03/19  16:40    


 


Urine Bilirubin  Negative  (NEGATIVE)   10/03/19  16:40    


 


Ur Leukocyte Esterase  Negative  (NEGATIVE)   10/03/19  16:40    








 Microbiology











 10/03/19 16:40 Urine Culture - Final





 Urine - Urine Clean Catch    Normal Urogenital Miesha














Imaging





- Results


Ultrasound: Report Reviewed (few GB calculi and sludge in gb, no 

pericholecystic fluid or wall thickening, no ductal dilation)


MRI: Report Reviewed (layering sludge in gallbladder, possible trace 

surrounding fluid vs artifact; no ductal dilation but images nondiagnostic for 

ductal stones secondary to motion artifact; atrophic pancreas with possible 

trace peripancreatic edema; + pericardial effusion not completely imaged), 

Image Reviewed





Problem List





- Problems


(1) Calculus of gallbladder without cholecystitis without obstruction


Assessment/Plan: 


pt with known history of cholecystitis, but no clear evidence of same on 

current imaging


wbc was initially up, but dropped to normal yesterday


not on antibiotics


clinically improved - tolerating clear liquids


trend labs including LFTs, ?lipase





patient would need optimization by multiple services prior to consideration for 

cholecystectomy - whether outpatient or inpatient


per pt's daughter, after last hospitalization, they were told by her heart 

doctor that she was not a good candidate for surgery, so never pursued outpt 

surgical evaluation


her comorbidities confer high risks for a major surgical procedure, including 

but not limited to her cardiac issues (afib, CHF, etc), hyperthyroidism, 

uncontrolled DM (A1C 12.8), COPD, limited mobility/METs and advanced age





no current indication for urgent or emergent surgical intervention


only way to exclude acute cholecystitis/cystic duct obstruction would be HIDA 

scan


if HIDA were positive, would recommend cholecystostomy drainage as most 

conservative measure


if negative, would strongly advise optimization of all comorbidities as 

outpatient with risk stratification by primary physician and cardiology, other 

services as appropriate - then pt may consider outpatient surgical followup to 

discuss possible cholecystectomy








discussed with Dr. Schafer


will follow up





Thank you for the opportunity to participate in the care of this patient.


Code(s): K80.20 - CALCULUS OF GALLBLADDER W/O CHOLECYSTITIS W/O OBSTRUCTION   





(2) RUQ pain


Code(s): R10.11 - RIGHT UPPER QUADRANT PAIN   





(3) Chronic systolic CHF (congestive heart failure)


Code(s): I50.22 - CHRONIC SYSTOLIC (CONGESTIVE) HEART FAILURE   





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

term current use of insulin


Assessment/Plan: 


A1C 12.8


ideally, would strongly recommend improving glucose control prior to any 

elective or semi-elective surgical procedure


uncontrolled diabetes increases risks of wound complications, postop infections

, poor healing


pt and daughters advised to eliminate all regular/refined/real sugar from her 

diet - substitutes and artificial sweeteners only, diet items not regular


pt follows with Dr. Adkins, has seen as outpt recently


consider dietician consultation for family


Code(s): E11.22 - TYPE 2 DIABETES MELLITUS W DIABETIC CHRONIC KIDNEY DISEASE; 

E11.65 - TYPE 2 DIABETES MELLITUS WITH HYPERGLYCEMIA   





(5) Afib


Assessment/Plan: 


eliquis at home


currently on therapeutic lovenox





Code(s): I48.91 - UNSPECIFIED ATRIAL FIBRILLATION   


Qualifiers: 


   Atrial fibrillation type: longstanding persistent   Qualified Code(s): 

I48.11 - Longstanding persistent atrial fibrillation   





(6) Constipation


Assessment/Plan: 


no BM for 5 days


consider dulcolax suppository, senna if no effect


Code(s): K59.00 - CONSTIPATION, UNSPECIFIED   


Qualifiers: 


   Constipation type: unspecified constipation type   Qualified Code(s): K59.00 

- Constipation, unspecified   





(7) HLD (hyperlipidemia)


Code(s): E78.5 - HYPERLIPIDEMIA, UNSPECIFIED   


Qualifiers: 


   Hyperlipidemia type: pure hypercholesterolemia   Qualified Code(s): E78.00 - 

Pure hypercholesterolemia, unspecified; E78.0 - Pure hypercholesterolemia   





(8) HTN (hypertension)


Code(s): I10 - ESSENTIAL (PRIMARY) HYPERTENSION   


Qualifiers: 


   Hypertension type: essential hypertension   Qualified Code(s): I10 - 

Essential (primary) hypertension   





(9) Hyperthyroidism


Code(s): E05.90 - THYROTOXICOSIS, UNSP WITHOUT THYROTOXIC CRISIS OR STORM

## 2019-10-06 LAB
ALBUMIN SERPL-MCNC: 2.9 G/DL (ref 3.4–5)
ALP SERPL-CCNC: 136 U/L (ref 45–117)
ALT SERPL-CCNC: 24 U/L (ref 13–61)
ANION GAP SERPL CALC-SCNC: 7 MMOL/L (ref 8–16)
AST SERPL-CCNC: 23 U/L (ref 15–37)
BILIRUB CONJ SERPL-MCNC: 0.5 MG/DL (ref 0–0.2)
BILIRUB SERPL-MCNC: 2.2 MG/DL (ref 0.2–1)
BUN SERPL-MCNC: 18.6 MG/DL (ref 7–18)
CALCIUM SERPL-MCNC: 8.5 MG/DL (ref 8.5–10.1)
CHLORIDE SERPL-SCNC: 110 MMOL/L (ref 98–107)
CO2 SERPL-SCNC: 25 MMOL/L (ref 21–32)
CREAT SERPL-MCNC: 0.7 MG/DL (ref 0.55–1.3)
DEPRECATED RDW RBC AUTO: 16.7 % (ref 11.6–15.6)
GLUCOSE SERPL-MCNC: 120 MG/DL (ref 74–106)
HCT VFR BLD CALC: 39.4 % (ref 32.4–45.2)
HGB BLD-MCNC: 12.7 GM/DL (ref 10.7–15.3)
MCH RBC QN AUTO: 27 PG (ref 25.7–33.7)
MCHC RBC AUTO-ENTMCNC: 32.1 G/DL (ref 32–36)
MCV RBC: 84.1 FL (ref 80–96)
PLATELET # BLD AUTO: 133 K/MM3 (ref 134–434)
PMV BLD: 8.9 FL (ref 7.5–11.1)
POTASSIUM SERPLBLD-SCNC: 4.8 MMOL/L (ref 3.5–5.1)
PROT SERPL-MCNC: 5.5 G/DL (ref 6.4–8.2)
RBC # BLD AUTO: 4.69 M/MM3 (ref 3.6–5.2)
SODIUM SERPL-SCNC: 141 MMOL/L (ref 136–145)
WBC # BLD AUTO: 6.3 K/MM3 (ref 4–10)

## 2019-10-06 RX ADMIN — INSULIN ASPART SCH UNITS: 100 INJECTION, SOLUTION INTRAVENOUS; SUBCUTANEOUS at 22:05

## 2019-10-06 RX ADMIN — INSULIN ASPART SCH: 100 INJECTION, SOLUTION INTRAVENOUS; SUBCUTANEOUS at 18:46

## 2019-10-06 RX ADMIN — ENOXAPARIN SODIUM SCH MG: 80 INJECTION SUBCUTANEOUS at 22:05

## 2019-10-06 RX ADMIN — SACUBITRIL AND VALSARTAN SCH TAB: 24; 26 TABLET, FILM COATED ORAL at 22:05

## 2019-10-06 RX ADMIN — SACUBITRIL AND VALSARTAN SCH TAB: 24; 26 TABLET, FILM COATED ORAL at 10:25

## 2019-10-06 RX ADMIN — INSULIN ASPART SCH: 100 INJECTION, SOLUTION INTRAVENOUS; SUBCUTANEOUS at 12:12

## 2019-10-06 RX ADMIN — ENOXAPARIN SODIUM SCH MG: 80 INJECTION SUBCUTANEOUS at 10:24

## 2019-10-06 RX ADMIN — DIGOXIN SCH MG: 125 TABLET ORAL at 10:24

## 2019-10-06 RX ADMIN — INSULIN ASPART SCH: 100 INJECTION, SOLUTION INTRAVENOUS; SUBCUTANEOUS at 06:20

## 2019-10-06 RX ADMIN — METHIMAZOLE SCH MG: 10 TABLET ORAL at 10:25

## 2019-10-06 RX ADMIN — SODIUM CHLORIDE SCH MLS/HR: 9 INJECTION, SOLUTION INTRAVENOUS at 16:00

## 2019-10-06 NOTE — PN
Progress Note, Physician


History of Present Illness: 





Pt with h/o cholecystitis, admitted with RUQ pain and vomiting, imaging thus 

far without clear signs of cholecystitis but known few gallstones and layering 

sludge. Also with multiple somatic complaints, comorbidities and on 

anticoagulation. She has clinically improved in the last few days and is 

tolerating clear liquids, but bili is rising and alk phos is slightly high. 

HIDA pending in am. She is seen and examined sitting up on edge of bed with 

multiple family and visitors present. She indicates she is feeling a little 

better. C/O suprapubic/lower abdominal pain in the middle. Had suppository last 

night, but has not moved bowels in 5-6 days at least now. 





- Current Medication List


Current Medications: 


Active Medications





Digoxin (Lanoxin -)  0.125 mg PO DAILY [facility]


   Last Admin: 10/06/19 10:24 Dose:  0.125 mg


Enoxaparin Sodium (Lovenox -)  80 mg SQ BID [facility]


   Last Admin: 10/06/19 10:24 Dose:  80 mg


Sodium Chloride (Normal Saline -)  1,000 mls @ 83 mls/hr IV ASDIR [facility]


Insulin Aspart (Novolog Vial Sliding Scale -)  1 vial SQ ACHS [facility]; Protocol


   Last Admin: 10/06/19 12:12 Dose:  Not Given


Methimazole (Tapazole -)  10 mg PO DAILY [facility]


   Last Admin: 10/06/19 10:25 Dose:  10 mg


Metoprolol Succinate (Toprol Xl -)  50 mg PO DAILY [facility]


   Last Admin: 10/06/19 10:24 Dose:  50 mg


Sacubitril/Valsartan (Entresto 24 Mg-26 Mg Tablet)  1 tab PO BID [facility]


   Last Admin: 10/06/19 10:25 Dose:  1 tab











- Objective


Vital Signs: 


 Vital Signs











Temperature  98.2 F   10/06/19 14:11


 


Pulse Rate  85   10/06/19 14:11


 


Respiratory Rate  20   10/06/19 14:11


 


Blood Pressure  127/73   10/06/19 14:11


 


O2 Sat by Pulse Oximetry (%)  100   10/05/19 21:00











Constitutional: Yes: No Distress, Calm, Obese


Eyes: Yes: Conjunctiva Clear, EOM Intact.  No: Sclera Icterus


HENT: Yes: Atraumatic, Normocephalic


Gastrointestinal: Yes: Soft, Abdomen, Obese, Tenderness (suprapubic primarily, 

no lamont/guarding).  No: Tenderness, Epigastrium (no epigastric or RUQ tenderness)


Extremities: No: Cool, Cyanosis


Integumentary: No: Jaundice, Rash


Neurological: Yes: Alert, Oriented


Labs: 


 CBC, BMP





 10/06/19 05:15 





 10/06/19 05:15 





  CMP











Sodium  141 mmol/L (136-145)   10/06/19  05:15    


 


Potassium  4.8 mmol/L (3.5-5.1)   10/06/19  05:15    


 


Chloride  110 mmol/L ()  H  10/06/19  05:15    


 


Carbon Dioxide  25 mmol/L (21-32)   10/06/19  05:15    


 


Anion Gap  7 MMOL/L (8-16)  L  10/06/19  05:15    


 


BUN  18.6 mg/dL (7-18)  H  10/06/19  05:15    


 


Creatinine  0.7 mg/dL (0.55-1.3)   10/06/19  05:15    


 


Est GFR (CKD-EPI)AfAm  92.86   10/06/19  05:15    


 


Est GFR (CKD-EPI)NonAf  80.12   10/06/19  05:15    


 


POC Glucometer  149 UNITS ()   10/06/19  11:32    


 


Random Glucose  120 mg/dL ()  H  10/06/19  05:15    


 


Hemoglobin A1c %  12.8 % (4.2-6.3)  H  10/04/19  05:25    


 


Lactic Acid  1.4 mmol/L (0.4-2.0)   10/04/19  00:32    


 


Calcium  8.5 mg/dL (8.5-10.1)   10/06/19  05:15    


 


Total Bilirubin  2.2 mg/dL (0.2-1)  H  10/06/19  05:15    


 


Direct Bilirubin  0.5 mg/dL (0.0-0.2)  H  10/06/19  05:15    


 


AST  23 U/L (15-37)   10/06/19  05:15    


 


ALT  24 U/L (13-61)   10/06/19  05:15    


 


Alkaline Phosphatase  136 U/L ()  H  10/06/19  05:15    


 


Creatine Kinase  41 U/L ()   10/03/19  21:00    


 


Troponin I  0.08 ng/ml (0.00-0.05)  H  10/04/19  12:27    


 


Total Protein  5.5 g/dl (6.4-8.2)  L  10/06/19  05:15    


 


Albumin  2.9 g/dl (3.4-5.0)  L  10/06/19  05:15    


 


Lipase  59 U/L ()  L  10/03/19  14:10    


 


TSH  0.09 uIU/ml (0.358-3.74)  L  10/04/19  05:25    


 


Free T4  1.50 ng/dl (0.76-1.46)  H  10/04/19  05:25    








bili up, alk phos slightly elevated


BUN/Cr much improved - at baseline for her/slightly below








Problem List





- Problems


(1) Calculus of gallbladder without cholecystitis without obstruction


Assessment/Plan: 


pt with known history of cholecystitis, but no clear evidence of same on 

current imaging


wbc normal


not on antibiotics


clinically improved - tolerating clear liquids


trend labs - bili rising





patient would need optimization by multiple services prior to consideration for 

cholecystectomy - whether outpatient or inpatient


per pt's daughter, after last hospitalization, they were told by her heart 

doctor that she was not a good candidate for surgery, so never pursued outpt 

surgical evaluation


her comorbidities confer high risks for a major surgical procedure, including 

but not limited to her cardiac issues (afib, CHF, etc), hyperthyroidism, 

uncontrolled DM (A1C 12.8), COPD, limited mobility/METs and advanced age





no current indication for urgent or emergent surgical intervention


only way to exclude acute cholecystitis/cystic duct obstruction is HIDA scan


ordered for am - needs to be NPO after midnight, continue IV fluids





if HIDA is positive, would recommend cholecystostomy drainage as most 

conservative measure


(will need to be off lovenox for 12-24 hrs to have done)


if negative, would strongly advise optimization of all comorbidities as 

outpatient with risk stratification by primary physician and cardiology, other 

services as appropriate - then pt may consider outpatient surgical followup to 

discuss possible cholecystectomy





discussed with Dr. Schafer


will follow up after HIDA done





Problems reviewed: Yes   


Code(s): K80.20 - CALCULUS OF GALLBLADDER W/O CHOLECYSTITIS W/O OBSTRUCTION   





(2) RUQ pain


Assessment/Plan: 


resolved


Code(s): R10.11 - RIGHT UPPER QUADRANT PAIN   





(3) Chronic systolic CHF (congestive heart failure)


Code(s): I50.22 - CHRONIC SYSTOLIC (CONGESTIVE) HEART FAILURE   





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

term current use of insulin


Assessment/Plan: 


A1C 12.8


ideally, would strongly recommend improving glucose control prior to any 

elective or semi-elective surgical procedure


uncontrolled diabetes increases risks of wound complications, postop infections

, poor healing


pt and daughters advised to eliminate all regular/refined/real sugar from her 

diet - substitutes and artificial sweeteners only, diet items not regular


pt follows with Dr. Adkins, has seen as outpt recently


consider dietician consultation for family


Code(s): E11.22 - TYPE 2 DIABETES MELLITUS W DIABETIC CHRONIC KIDNEY DISEASE; 

E11.65 - TYPE 2 DIABETES MELLITUS WITH HYPERGLYCEMIA   





(5) Afib


Assessment/Plan: 


eliquis at home


currently on therapeutic lovenox





Code(s): I48.91 - UNSPECIFIED ATRIAL FIBRILLATION   


Qualifiers: 


   Atrial fibrillation type: longstanding persistent   Qualified Code(s): 

I48.11 - Longstanding persistent atrial fibrillation   





(6) Constipation


Assessment/Plan: 


no BM for at least 6 days


dulcolax suppository had no effect


senna ordered now


if still no effect, recommend ZANDER with possible disimpaction by primary team


Problems reviewed: Yes   


Code(s): K59.00 - CONSTIPATION, UNSPECIFIED   


Qualifiers: 


   Constipation type: unspecified constipation type   Qualified Code(s): K59.00 

- Constipation, unspecified   





(7) HLD (hyperlipidemia)


Code(s): E78.5 - HYPERLIPIDEMIA, UNSPECIFIED   


Qualifiers: 


   Hyperlipidemia type: pure hypercholesterolemia   Qualified Code(s): E78.00 - 

Pure hypercholesterolemia, unspecified; E78.0 - Pure hypercholesterolemia   





(8) HTN (hypertension)


Code(s): I10 - ESSENTIAL (PRIMARY) HYPERTENSION   


Qualifiers: 


   Hypertension type: essential hypertension   Qualified Code(s): I10 - 

Essential (primary) hypertension   





(9) Hyperthyroidism


Code(s): E05.90 - THYROTOXICOSIS, UNSP WITHOUT THYROTOXIC CRISIS OR STORM

## 2019-10-06 NOTE — PN
Physical Exam: 


SUBJECTIVE: Patient seen and examined. She reports she is having lower 

abdominal pain. She denies dysuria, urinary frequency.








OBJECTIVE:





 Vital Signs











 Period  Temp  Pulse  Resp  BP Sys/Lennon  Pulse Ox


 


 Last 24 Hr  97.8 F-98.3 F  60-88  18-20  116-129/59-76  100











GENERAL: The patient is awake, alert, and fully oriented, in no acute distress.


LUNGS: Breath sounds equal, clear to auscultation bilaterally, no wheezes, no 

crackles, no accessory muscle use. 


HEART: Irregularly irregular without murmur, rub or gallop.


ABDOMEN: Obese, soft, nontender, nondistended, normoactive bowel sounds, no 

guarding, no rebound, no hepatosplenomegaly, no masses.


EXTREMITIES: 2+ pulses, warm, well-perfused, no edema. 














 Laboratory Results - last 24 hr











  10/05/19 10/05/19 10/06/19





  17:21 23:05 05:15


 


WBC    6.3


 


RBC    4.69


 


Hgb    12.7


 


Hct    39.4


 


MCV    84.1


 


MCH    27.0


 


MCHC    32.1


 


RDW    16.7 H


 


Plt Count    133 L


 


MPV    8.9


 


Sodium   


 


Potassium   


 


Chloride   


 


Carbon Dioxide   


 


Anion Gap   


 


BUN   


 


Creatinine   


 


Est GFR (CKD-EPI)AfAm   


 


Est GFR (CKD-EPI)NonAf   


 


POC Glucometer  235  100 


 


Random Glucose   


 


Calcium   


 


Total Bilirubin   


 


Direct Bilirubin   


 


AST   


 


ALT   


 


Alkaline Phosphatase   


 


Total Protein   


 


Albumin   














  10/06/19 10/06/19 10/06/19





  05:15 05:50 11:32


 


WBC   


 


RBC   


 


Hgb   


 


Hct   


 


MCV   


 


MCH   


 


MCHC   


 


RDW   


 


Plt Count   


 


MPV   


 


Sodium  141  


 


Potassium  4.8  


 


Chloride  110 H  


 


Carbon Dioxide  25  


 


Anion Gap  7 L  


 


BUN  18.6 H  


 


Creatinine  0.7  


 


Est GFR (CKD-EPI)AfAm  92.86  


 


Est GFR (CKD-EPI)NonAf  80.12  


 


POC Glucometer   123  149


 


Random Glucose  120 H  


 


Calcium  8.5  


 


Total Bilirubin  2.2 H  


 


Direct Bilirubin  0.5 H  


 


AST  23  


 


ALT  24  


 


Alkaline Phosphatase  136 H  


 


Total Protein  5.5 L  


 


Albumin  2.9 L  








Active Medications











Generic Name Dose Route Start Last Admin





  Trade Name Freq  PRN Reason Stop Dose Admin


 


Digoxin  0.125 mg  10/04/19 10:00  10/06/19 10:24





  Lanoxin -  PO   0.125 mg





  DAILY JULIEN   Administration





     





     





     





     


 


Enoxaparin Sodium  80 mg  10/05/19 11:45  10/06/19 10:24





  Lovenox -  SQ   80 mg





  BID JULIEN   Administration





     





     





     





     


 


Sodium Chloride  1,000 mls @ 100 mls/hr  10/03/19 20:45  10/05/19 23:08





  Normal Saline -  IV   Not Given





  [facility]   





     





     





     





     


 


Insulin Aspart  1 vial  10/05/19 11:00  10/06/19 12:12





  Novolog Vial Sliding Scale -  SQ   Not Given





  [facility]   





     





     





  Protocol   





     


 


Methimazole  10 mg  10/05/19 10:00  10/06/19 10:25





  Tapazole -  PO   10 mg





  DAILY JULIEN   Administration





     





     





     





     


 


Metoprolol Succinate  50 mg  10/04/19 10:00  10/06/19 10:24





  Toprol Xl -  PO   50 mg





  DAILY JULIEN   Administration





     





     





     





     


 


Sacubitril/Valsartan  1 tab  10/03/19 22:00  10/06/19 10:25





  Entresto 24 Mg-26 Mg Tablet  PO   1 tab





  BID JULIEN   Administration





     





     





     





     











ASSESSMENT/PLAN:


This is an 83 year old woman with a history of HTN, hyperlipidemia, type 2 DM, 

GERD, atrial fib/flutter, chronic systolic heart failure, hyperthyroidism who 

presented to the ED with abdominal pain, vomiting, and dizziness.





1. Symptomatic cholelithiasis, possible acute vs chronic cholecystitis


   - Patient was here 11/2018 with acute cholecystitis and was thought to be a 

poor surgical candidate at that time. She was managed conservativey with IV 

antibiotics and improved. Cholecystostomy was not done at the time because she 

improved. She was advised to follow up with surgery for an interval 

cholecystectomy.


   - Surgery evaluation appreciated


   - Afebrile, pain is improving


   - Tolerating clear liquids


   - Plan for HIDA tomorrow


2. Elevated troponin


   - Possibly demand ischemia


3. Acute kidney injury


   - Improved


4. Hyperkalemia


   - Improved


5. Permanent atrial fibrillation


   - Rate controlled


   - Continue Toprol XL, Digoxin


   - On Eliquis at home - Changed to Lovenox in case a procedure is necessary


6. Chronic systolic heart failure


   - Stable


   - Continue Entresto


7. HTN


   - Continue Toprol XL, valsartan


8. Hyperlipidemia


9. Hyperthyroidism


   - Continue methimazole


10. Type 2 DM 


   - Continue Novolog sliding scale


11. DVT prophylaxis


   - On Lovenox








Visit type





- Emergency Visit


Emergency Visit: Yes


ED Registration Date: 10/03/19


Care time: The patient presented to the Emergency Department on the above date 

and was hospitalized for further evaluation of their emergent condition.





- New Patient


This patient is new to me today: No





- Critical Care


Critical Care patient: No





- Discharge Referral


Referred to [facility] No

## 2019-10-07 LAB
ALBUMIN SERPL-MCNC: 3.1 G/DL (ref 3.4–5)
ALP SERPL-CCNC: 147 U/L (ref 45–117)
ALT SERPL-CCNC: 27 U/L (ref 13–61)
ANION GAP SERPL CALC-SCNC: 5 MMOL/L (ref 8–16)
AST SERPL-CCNC: 22 U/L (ref 15–37)
BILIRUB CONJ SERPL-MCNC: 0.6 MG/DL (ref 0–0.2)
BILIRUB SERPL-MCNC: 2.3 MG/DL (ref 0.2–1)
BUN SERPL-MCNC: 13.1 MG/DL (ref 7–18)
CALCIUM SERPL-MCNC: 8.9 MG/DL (ref 8.5–10.1)
CHLORIDE SERPL-SCNC: 109 MMOL/L (ref 98–107)
CO2 SERPL-SCNC: 27 MMOL/L (ref 21–32)
CREAT SERPL-MCNC: 0.8 MG/DL (ref 0.55–1.3)
DEPRECATED RDW RBC AUTO: 16.3 % (ref 11.6–15.6)
GLUCOSE SERPL-MCNC: 112 MG/DL (ref 74–106)
HCT VFR BLD CALC: 40.2 % (ref 32.4–45.2)
HGB BLD-MCNC: 12.9 GM/DL (ref 10.7–15.3)
MCH RBC QN AUTO: 27.1 PG (ref 25.7–33.7)
MCHC RBC AUTO-ENTMCNC: 32.2 G/DL (ref 32–36)
MCV RBC: 84.1 FL (ref 80–96)
PLATELET # BLD AUTO: 148 K/MM3 (ref 134–434)
PMV BLD: 9.4 FL (ref 7.5–11.1)
POTASSIUM SERPLBLD-SCNC: 5 MMOL/L (ref 3.5–5.1)
PROT SERPL-MCNC: 6 G/DL (ref 6.4–8.2)
RBC # BLD AUTO: 4.78 M/MM3 (ref 3.6–5.2)
SODIUM SERPL-SCNC: 141 MMOL/L (ref 136–145)
WBC # BLD AUTO: 6.2 K/MM3 (ref 4–10)

## 2019-10-07 RX ADMIN — ENOXAPARIN SODIUM SCH MG: 80 INJECTION SUBCUTANEOUS at 12:26

## 2019-10-07 RX ADMIN — INSULIN ASPART SCH: 100 INJECTION, SOLUTION INTRAVENOUS; SUBCUTANEOUS at 18:04

## 2019-10-07 RX ADMIN — ENOXAPARIN SODIUM SCH MG: 80 INJECTION SUBCUTANEOUS at 22:57

## 2019-10-07 RX ADMIN — SODIUM CHLORIDE SCH MLS/HR: 9 INJECTION, SOLUTION INTRAVENOUS at 14:53

## 2019-10-07 RX ADMIN — SACUBITRIL AND VALSARTAN SCH TAB: 24; 26 TABLET, FILM COATED ORAL at 12:25

## 2019-10-07 RX ADMIN — METHIMAZOLE SCH MG: 10 TABLET ORAL at 12:27

## 2019-10-07 RX ADMIN — SACUBITRIL AND VALSARTAN SCH TAB: 24; 26 TABLET, FILM COATED ORAL at 22:57

## 2019-10-07 RX ADMIN — INSULIN ASPART SCH UNITS: 100 INJECTION, SOLUTION INTRAVENOUS; SUBCUTANEOUS at 22:57

## 2019-10-07 RX ADMIN — INSULIN ASPART SCH: 100 INJECTION, SOLUTION INTRAVENOUS; SUBCUTANEOUS at 12:35

## 2019-10-07 RX ADMIN — DIGOXIN SCH MG: 125 TABLET ORAL at 12:32

## 2019-10-07 RX ADMIN — INSULIN ASPART SCH: 100 INJECTION, SOLUTION INTRAVENOUS; SUBCUTANEOUS at 06:37

## 2019-10-07 NOTE — PN
Progress Note (short form)





- Note


Progress Note: 





HPI: No events overnight. No events noted on telemetry. Pt reports her pain is 

improved compared to previous days. She denies any nausea, vomiting, fever/

chills. She has not eaten since midnight. Explained her upcoming HIDA scan to 

her. No complaints today.


 


 Vital Signs











Temperature  97.8 F   10/07/19 06:25


 


Pulse Rate  72   10/07/19 06:25


 


Respiratory Rate  20   10/07/19 06:25


 


Blood Pressure  136/85   10/07/19 06:25


 


O2 Sat by Pulse Oximetry (%)  99   10/06/19 21:00











GEN: NAD, awake, alert, oriented x3


HEENT: Nc/AT, EOMI, ART, sclera anicteric, MMM


Neck: No JVD


LUNG: CTA b/l without any rales or wheezes. No accesory muscle use. On RA


CARD: Irregularly irregular rhythm with normal rate. No murmurs


ABD: Soft, normoactive BS, nondistended, TTP R>L epigastric region, no guarding

, no rebound, no ecchymotic areas noted


EXT: No edema





 CBC, BMP





 10/07/19 05:40 





 10/07/19 05:40 





 Hepatic Panel











Total Bilirubin  2.3 mg/dL (0.2-1)  H  10/07/19  05:40    


 


Direct Bilirubin  0.6 mg/dL (0.0-0.2)  H  10/07/19  05:40    


 


AST  22 U/L (15-37)   10/07/19  05:40    


 


ALT  27 U/L (13-61)   10/07/19  05:40    


 


Alkaline Phosphatase  147 U/L ()  H  10/07/19  05:40    


 


Albumin  3.1 g/dl (3.4-5.0)  L  10/07/19  05:40    











Active Medications





Digoxin (Lanoxin -)  0.125 mg PO DAILY [facility]


   Last Admin: 10/06/19 10:24 Dose:  0.125 mg


Enoxaparin Sodium (Lovenox -)  80 mg SQ BID [facility]


   Last Admin: 10/06/19 22:05 Dose:  80 mg


Sodium Chloride (Normal Saline -)  1,000 mls @ 83 mls/hr IV ASDIR [facility]


   Last Admin: 10/06/19 16:00 Dose:  83 mls/hr


Insulin Aspart (Novolog Vial Sliding Scale -)  1 vial SQ ACHS [facility]; Protocol


   Last Admin: 10/07/19 06:37 Dose:  Not Given


Methimazole (Tapazole -)  10 mg PO DAILY [facility]


   Last Admin: 10/06/19 10:25 Dose:  10 mg


Metoprolol Succinate (Toprol Xl -)  50 mg PO DAILY [facility]


   Last Admin: 10/06/19 10:24 Dose:  50 mg


Sacubitril/Valsartan (Entresto 24 Mg-26 Mg Tablet)  1 tab PO BID [facility]


   Last Admin: 10/06/19 22:05 Dose:  1 tab











A/P


Biliary Colic r/o cholelithiasis


Elevated troponin


Atrial fibrillation


Uncontrolled diabetes mellitus


HTN


HLD





--Pt for HIDA this AM; remains NPO for exam


--Appreciate surgical recommendations and will notify once HIDA performed for 

further recs


--TB stable at this time at 2.3


--Therapeutic dose lovenox due to Afib which can be held prior to procedure if 

necessary


   --If procedure is indicated can have cardiopulmonary clearance consulted for 

procedure


--Continue rate control with Toprol XL 50mg qdaily


--Continue Entresto 24-26 BID PO


--A1c 12.8%; will need optimization in terms of outpatient insulin regiment





FEN:


   Fluids: NS@83cc/hr due to high risk of heart failure


   Electrolyte abnormalities: None


   Nutrition: NPO while awaiting recs/for MRCP





PPX:


   DVT - Lovenox 80mg BID SQ





Dispo: HIDA scan and further recs via surgery afterwards


Case discussed with Dr. Gilmar Calderon, DO - IM PGY-3

## 2019-10-07 NOTE — PN
Teaching Attending Note


Name of Resident: Burak Calderon





ATTENDING PHYSICIAN STATEMENT





I saw and evaluated the patient.


I reviewed the resident's note and discussed the case with the resident.


I agree with the resident's findings and plan as documented.








SUBJECTIVE: Pain is improving.








OBJECTIVE:


 Vital Signs











 Period  Temp  Pulse  Resp  BP Sys/Lennon  Pulse Ox


 


 Last 24 Hr  97.5 F-98.2 F  59-85  20-20  111-136/57-85  99








HEART: Irregular


LUNGS: Clear


ABDOMEN: Obese, soft, (+) RUQ tenderness, non-distended, normal BS


EXTREMITIES: No edema








 Laboratory Results - last 24 hr











  10/06/19 10/06/19 10/07/19





  16:09 22:03 05:40


 


WBC    6.2


 


RBC    4.78


 


Hgb    12.9


 


Hct    40.2


 


MCV    84.1


 


MCH    27.1


 


MCHC    32.2


 


RDW    16.3 H


 


Plt Count    148


 


MPV    9.4


 


Sodium   


 


Potassium   


 


Chloride   


 


Carbon Dioxide   


 


Anion Gap   


 


BUN   


 


Creatinine   


 


Est GFR (CKD-EPI)AfAm   


 


Est GFR (CKD-EPI)NonAf   


 


POC Glucometer  203  166 


 


Random Glucose   


 


Calcium   


 


Total Bilirubin   


 


Direct Bilirubin   


 


AST   


 


ALT   


 


Alkaline Phosphatase   


 


Total Protein   


 


Albumin   














  10/07/19 10/07/19 10/07/19





  05:40 06:34 12:33


 


WBC   


 


RBC   


 


Hgb   


 


Hct   


 


MCV   


 


MCH   


 


MCHC   


 


RDW   


 


Plt Count   


 


MPV   


 


Sodium  141  


 


Potassium  5.0  


 


Chloride  109 H  


 


Carbon Dioxide  27  


 


Anion Gap  5 L  


 


BUN  13.1  


 


Creatinine  0.8  


 


Est GFR (CKD-EPI)AfAm  79.02  


 


Est GFR (CKD-EPI)NonAf  68.18  


 


POC Glucometer   120  103


 


Random Glucose  112 H  


 


Calcium  8.9  


 


Total Bilirubin  2.3 H  


 


Direct Bilirubin  0.6 H  


 


AST  22  


 


ALT  27  


 


Alkaline Phosphatase  147 H  


 


Total Protein  6.0 L  


 


Albumin  3.1 L  








 Current Medications











Generic Name Dose Route Start Last Admin





  Trade Name Freq  PRN Reason Stop Dose Admin


 


Digoxin  0.125 mg  10/04/19 10:00  10/07/19 12:32





  Lanoxin -  PO   0.125 mg





  DAILY JULIEN   Administration





     





     





     





     


 


Enoxaparin Sodium  80 mg  10/05/19 11:45  10/07/19 12:26





  Lovenox -  SQ   80 mg





  BID JULIEN   Administration





     





     





     





     


 


Sodium Chloride  1,000 mls @ 83 mls/hr  10/06/19 14:30  10/06/19 16:00





  Normal Saline -  IV   83 mls/hr





  [facility] JULIEN   Administration





     





     





     





     


 


Insulin Aspart  1 vial  10/05/19 11:00  10/07/19 12:35





  Novolog Vial Sliding Scale -  SQ   Not Given





  [facility]   





     





     





  Protocol   





     


 


Methimazole  10 mg  10/05/19 10:00  10/07/19 12:27





  Tapazole -  PO   10 mg





  DAILY JULIEN   Administration





     





     





     





     


 


Metoprolol Succinate  50 mg  10/04/19 10:00  10/07/19 12:27





  Toprol Xl -  PO   50 mg





  DAILY JULIEN   Administration





     





     





     





     


 


Sacubitril/Valsartan  1 tab  10/03/19 22:00  10/07/19 12:25





  Entresto 24 Mg-26 Mg Tablet  PO   1 tab





  BID JULIEN   Administration





     





     





     





     














ASSESSMENT AND PLAN:


This is an 83 year old woman with a history of HTN, hyperlipidemia, type 2 DM, 

GERD, atrial fib/flutter, chronic systolic heart failure, hyperthyroidism who 

presented to the ED with abdominal pain, vomiting, and dizziness.





1. Symptomatic cholelithiasis, possible acute vs chronic cholecystitis


   - Patient was here 11/2018 with acute cholecystitis and was thought to be a 

poor surgical candidate at that time. She was managed conservatively with IV 

antibiotics and improved. Cholecystostomy was not done at the time because she 

improved. She was advised to follow up with surgery for an interval 

cholecystectomy.


   - Afebrile, pain is improving


   - HIDA pending


2. Elevated troponin


   - Possibly demand ischemia


3. Acute kidney injury


   - Improved


4. Hyperkalemia


   - Improved


5. Permanent atrial fibrillation


   - Rate controlled


   - Continue Toprol XL, Digoxin


   - On Eliquis at home - Changed to Lovenox in case a procedure is necessary


6. Chronic systolic heart failure


   - Stable


   - Continue Entresto


7. HTN


   - Continue Toprol XL, valsartan


8. Hyperlipidemia


9. Hyperthyroidism


   - Continue methimazole


10. Type 2 DM 


   - Continue Novolog sliding scale


11. DVT prophylaxis


   - On Lovenox

## 2019-10-08 VITALS — TEMPERATURE: 98.3 F | HEART RATE: 88 BPM | SYSTOLIC BLOOD PRESSURE: 131 MMHG | DIASTOLIC BLOOD PRESSURE: 67 MMHG

## 2019-10-08 LAB
ALBUMIN SERPL-MCNC: 2.9 G/DL (ref 3.4–5)
ALP SERPL-CCNC: 139 U/L (ref 45–117)
ALT SERPL-CCNC: 26 U/L (ref 13–61)
ANION GAP SERPL CALC-SCNC: 7 MMOL/L (ref 8–16)
AST SERPL-CCNC: 22 U/L (ref 15–37)
BILIRUB SERPL-MCNC: 2 MG/DL (ref 0.2–1)
BUN SERPL-MCNC: 16.5 MG/DL (ref 7–18)
CALCIUM SERPL-MCNC: 8.8 MG/DL (ref 8.5–10.1)
CHLORIDE SERPL-SCNC: 110 MMOL/L (ref 98–107)
CO2 SERPL-SCNC: 24 MMOL/L (ref 21–32)
CREAT SERPL-MCNC: 0.8 MG/DL (ref 0.55–1.3)
DEPRECATED RDW RBC AUTO: 16.8 % (ref 11.6–15.6)
GLUCOSE SERPL-MCNC: 140 MG/DL (ref 74–106)
HCT VFR BLD CALC: 39.1 % (ref 32.4–45.2)
HGB BLD-MCNC: 12.6 GM/DL (ref 10.7–15.3)
MAGNESIUM SERPL-MCNC: 1.4 MG/DL (ref 1.8–2.4)
MCH RBC QN AUTO: 26.9 PG (ref 25.7–33.7)
MCHC RBC AUTO-ENTMCNC: 32.2 G/DL (ref 32–36)
MCV RBC: 83.6 FL (ref 80–96)
PHOSPHATE SERPL-MCNC: 2.3 MG/DL (ref 2.5–4.9)
PLATELET # BLD AUTO: 139 K/MM3 (ref 134–434)
PMV BLD: 9.2 FL (ref 7.5–11.1)
POTASSIUM SERPLBLD-SCNC: 4.1 MMOL/L (ref 3.5–5.1)
PROT SERPL-MCNC: 5.6 G/DL (ref 6.4–8.2)
RBC # BLD AUTO: 4.68 M/MM3 (ref 3.6–5.2)
SODIUM SERPL-SCNC: 141 MMOL/L (ref 136–145)
WBC # BLD AUTO: 6.5 K/MM3 (ref 4–10)

## 2019-10-08 RX ADMIN — METHIMAZOLE SCH MG: 10 TABLET ORAL at 09:21

## 2019-10-08 RX ADMIN — INSULIN ASPART SCH UNITS: 100 INJECTION, SOLUTION INTRAVENOUS; SUBCUTANEOUS at 11:09

## 2019-10-08 RX ADMIN — INSULIN ASPART SCH: 100 INJECTION, SOLUTION INTRAVENOUS; SUBCUTANEOUS at 06:29

## 2019-10-08 RX ADMIN — DIGOXIN SCH MG: 125 TABLET ORAL at 09:21

## 2019-10-08 RX ADMIN — ENOXAPARIN SODIUM SCH MG: 80 INJECTION SUBCUTANEOUS at 09:21

## 2019-10-08 RX ADMIN — SACUBITRIL AND VALSARTAN SCH TAB: 24; 26 TABLET, FILM COATED ORAL at 09:22

## 2019-10-08 NOTE — PN
Teaching Attending Note


Name of Resident: Burak Calderon





ATTENDING PHYSICIAN STATEMENT





I saw and evaluated the patient.


I reviewed the resident's note and discussed the case with the resident.


I agree with the resident's findings and plan as documented.








SUBJECTIVE: Patient has no complaints. Tolerating diet.








OBJECTIVE:


 Vital Signs











 Period  Temp  Pulse  Resp  BP Sys/Lennon  Pulse Ox


 


 Last 24 Hr  97.6 F-98.6 F  60-88  18-20  100-142/53-78  96-97








HEART: Irregular


LUNGS: Clear


ABDOMEN: Obese, soft, non-tender, non-distended, normal BS


EXTREMITIES: No edema





 Laboratory Results - last 24 hr











  10/07/19 10/07/19 10/08/19





  18:03 21:54 06:00


 


WBC    6.5


 


RBC    4.68


 


Hgb    12.6


 


Hct    39.1


 


MCV    83.6


 


MCH    26.9


 


MCHC    32.2


 


RDW    16.8 H


 


Plt Count    139


 


MPV    9.2


 


Sodium   


 


Potassium   


 


Chloride   


 


Carbon Dioxide   


 


Anion Gap   


 


BUN   


 


Creatinine   


 


Est GFR (CKD-EPI)AfAm   


 


Est GFR (CKD-EPI)NonAf   


 


POC Glucometer  90  235 


 


Random Glucose   


 


Calcium   


 


Phosphorus   


 


Magnesium   


 


Total Bilirubin   


 


AST   


 


ALT   


 


Alkaline Phosphatase   


 


Total Protein   


 


Albumin   














  10/08/19 10/08/19 10/08/19





  06:00 06:15 11:03


 


WBC   


 


RBC   


 


Hgb   


 


Hct   


 


MCV   


 


MCH   


 


MCHC   


 


RDW   


 


Plt Count   


 


MPV   


 


Sodium  141  


 


Potassium  4.1  


 


Chloride  110 H  


 


Carbon Dioxide  24  


 


Anion Gap  7 L  


 


BUN  16.5  


 


Creatinine  0.8  


 


Est GFR (CKD-EPI)AfAm  79.02  


 


Est GFR (CKD-EPI)NonAf  68.18  


 


POC Glucometer   138  264


 


Random Glucose  140 H  


 


Calcium  8.8  


 


Phosphorus  2.3 L  


 


Magnesium  1.4 L  


 


Total Bilirubin  2.0 H  


 


AST  22  


 


ALT  26  


 


Alkaline Phosphatase  139 H  


 


Total Protein  5.6 L  


 


Albumin  2.9 L  








 Current Medications











Generic Name Dose Route Start Last Admin





  Trade Name Freq  PRN Reason Stop Dose Admin


 


Digoxin  0.125 mg  10/04/19 10:00  10/08/19 09:21





  Lanoxin -  PO   0.125 mg





  DAILY JULIEN   Administration





     





     





     





     


 


Enoxaparin Sodium  80 mg  10/05/19 11:45  10/08/19 09:21





  Lovenox -  SQ   80 mg





  BID JULIEN   Administration





     





     





     





     


 


Insulin Aspart  1 vial  10/05/19 11:00  10/08/19 11:09





  Novolog Vial Sliding Scale -  SQ   6 units





  [facility] JULIEN   Administration





     





     





  Protocol   





     


 


Methimazole  10 mg  10/05/19 10:00  10/08/19 09:21





  Tapazole -  PO   10 mg





  DAILY JULIEN   Administration





     





     





     





     


 


Metoprolol Succinate  50 mg  10/04/19 10:00  10/08/19 09:21





  Toprol Xl -  PO   50 mg





  DAILY JULIEN   Administration





     





     





     





     


 


Sacubitril/Valsartan  1 tab  10/03/19 22:00  10/08/19 09:22





  Entresto 24 Mg-26 Mg Tablet  PO   1 tab





  BID JULIEN   Administration





     





     





     





     














ASSESSMENT AND PLAN:


This is an 83 year old woman with a history of HTN, hyperlipidemia, type 2 DM, 

GERD, atrial fib/flutter, chronic systolic heart failure, hyperthyroidism who 

presented to the ED with abdominal pain, vomiting, and dizziness.





1. Symptomatic cholelithiasis, possible acute vs chronic cholecystitis


   - Patient was here 11/2018 with acute cholecystitis and was thought to be a 

poor surgical candidate at that time. She was managed conservatively with IV 

antibiotics and improved. Cholecystostomy was not done at the time because she 

improved. She was advised to follow up with surgery for an interval 

cholecystectomy.


   - Afebrile, pain is improving


   - HIDA shows gallbladder filling, nonspecific distention of gallbladder with 

retention after 2 hours - ? chronic cholecystitis, ? dyskinesia


   - Discussed with surgery - Patient can be discharged home with outpatient 

follow-up. If cholecystectomy needed, she will need optimization of her medical 

conditions


2. Elevated troponin


   - Possibly demand ischemia


3. Acute kidney injury


   - Improved


4. Hyperkalemia


   - Improved


5. Permanent atrial fibrillation


   - Rate controlled


   - Continue Toprol XL, Digoxin


   - Resume Eliquis at discharge


6. Chronic systolic heart failure


   - Stable


   - Continue Entresto


7. HTN


   - Continue Toprol XL, valsartan


8. Hyperlipidemia


9. Hyperthyroidism


   - Continue methimazole


10. Type 2 DM 


   - Continue Novolog sliding scale


11. Disposition


   - Ok for discharge home today

## 2019-10-08 NOTE — DS
Physical Exam: 


SUBJECTIVE: No complaints today. No nausea vomiting or abdominal pain.








OBJECTIVE:





 Vital Signs











 Period  Temp  Pulse  Resp  BP Sys/Lennon  Pulse Ox


 


 Last 24 Hr  97.6 F-98.3 F    20-20  117-146/57-90  96-97








PHYSICAL EXAM





GEN: NAD, awake, alert, oriented x3


HEENT: Nc/AT, EOMI, ART, sclera anicteric, MMM


Neck: No JVD


LUNG: CTA b/l without any rales or wheezes. No accesory muscle use. On RA


CARD: Irregularly irregular rhythm with normal rate. No murmurs


ABD: Soft, normoactive BS, nondistended, TTP R>L epigastric region, no guarding

, no rebound, no ecchymotic areas noted


EXT: No edema


LABS


 Laboratory Results - last 24 hr











  10/08/19





  06:00


 


WBC  6.5


 


RBC  4.68


 


Hgb  12.6


 


Hct  39.1


 


MCV  83.6


 


MCH  26.9


 


MCHC  32.2


 


RDW  16.8 H


 


Plt Count  139


 


MPV  9.2


 


Sodium 


 


Potassium 


 


Chloride 


 


Carbon Dioxide 


 


Anion Gap 


 


BUN 


 


Creatinine 


 


Est GFR (CKD-EPI)AfAm 


 


Est GFR (CKD-EPI)NonAf 


 


POC Glucometer 


 


Random Glucose 


 


Calcium 


 


Phosphorus 


 


Magnesium 


 


Total Bilirubin 


 


AST 


 


ALT 


 


Alkaline Phosphatase 


 


Total Protein 


 


Albumin 














  10/08/19





  06:00


 


WBC 


 


RBC 


 


Hgb 


 


Hct 


 


MCV 


 


MCH 


 


MCHC 


 


RDW 


 


Plt Count 


 


MPV 


 


Sodium  141


 


Potassium  4.1


 


Chloride  110 H


 


Carbon Dioxide  24


 


Anion Gap  7 L


 


BUN  16.5


 


Creatinine  0.8


 


Est GFR (CKD-EPI)AfAm  79.02


 


Est GFR (CKD-EPI)NonAf  68.18


 


POC Glucometer 


 


Random Glucose  140 H


 


Calcium  8.8


 


Phosphorus  2.3 L


 


Magnesium  1.4 L


 


Total Bilirubin  2.0 H


 


AST  22


 


ALT  26


 


Alkaline Phosphatase  139 H


 


Total Protein  5.6 L


 


Albumin  2.9 L











 Laboratory Tests











  10/04/19 10/04/19





  05:25 05:25


 


Hemoglobin A1c %   12.8 H


 


TSH  0.09 L 


 


Free T4  1.50 H 














Active Medications





Digoxin (Lanoxin -)  0.125 mg PO DAILY [facility]


   Last Admin: 10/08/19 09:21 Dose:  0.125 mg


Enoxaparin Sodium (Lovenox -)  80 mg SQ BID [facility]


   Last Admin: 10/08/19 09:21 Dose:  80 mg


Insulin Aspart (Novolog Vial Sliding Scale -)  1 vial SQ ACHS [facility]; Protocol


   Last Admin: 10/08/19 11:09 Dose:  6 units


Methimazole (Tapazole -)  10 mg PO DAILY [facility]


   Last Admin: 10/08/19 09:21 Dose:  10 mg


Metoprolol Succinate (Toprol Xl -)  50 mg PO DAILY [facility]


   Last Admin: 10/08/19 09:21 Dose:  50 mg


Sacubitril/Valsartan (Entresto 24 Mg-26 Mg Tablet)  1 tab PO BID [facility]


   Last Admin: 10/08/19 09:22 Dose:  1 tab





 Microbiology





10/03/19 16:40   Urine - Urine Clean Catch   Urine Culture - Final


                            Normal Urogenital Miesha





Imaging:





HIDA Scan:IMPRESSION:


Filling of the gallbladder excludes acute cystic duct obstruction.


Nonspecific significant distention of the gallbladder with significant 

retention of tracer after 2


hours of imaging could be secondary to chronic cholecystitis or gallbladder 

dyskinesia.





MRCP: 


Markedly limited exam of very poor diagnostic value due to significant 

respiratory motion.


The MRCP images are nondiagnostic.


Within the limitations of the exam,


Distended gallbladder with moderate amount of flattening sludge seen but no 

abnormal


gallbladder wall thickening. Trace pericholecystic fluid/edema versus artifact 

noted.


Correlation with clinical history is needed to exclude cholecystitis. If 

indicated HIDA scan


may be obtained for further evaluation.


No gross biliary ductal dilatation however evaluation for CBD filling defects /


choledocholithiasis is not feasible on this exam.


Atrophic pancreas with questionable peripancreatic edema versus artifacts. No 

pancreatic


ductal dilatation seen. Correlate with clinical history, amylase and lipase 

level to exclude


pancreatitis.


1.6 cm incompletely characterized right upper renal pole cyst.





CXR: 


Impression: Large heart. Slight right hilar prominence. No change of an adverse 

nature since


11/26/2018.





Head CT noncontrast:


IMPRESSION:


Moderate volume loss and mild periventricular chronic microvascular ischemic 

disease


changes.


No gross acute intracranial pathology is identified.


Correlate clinically to determine further evaluation and follow-up.


Mild chronic sinusitis in the left maxillary antrum, inferiorly.





HOSPITAL COURSE:





Date of Admission:10/03/19





Date of Discharge: 10/08/19





Pt was admitted on 10/3/19 with abdominal pain, nausea, and dizziness which was 

suspected to be possible biliary colic vs. ongoing gallstone cholecystitis. Pt 

recevied an MRCP and HIDA scan which did not show any cholelithiasis. However 

it was shown that the patient has biliary dyskinesia. Pt was seen by a surgeon 

who did not suggest immediate surgical intervention and would require medical 

optimization prior to an elective procedure. Pt's A1c at this visit is 12.8%. 

Last LFTs as above. Pt's pain improved and is able to tolerate diet. While on 

diabetic diet pt's glucose levels were adequately controlled with range '

s. 





Of note, during her stay here pt was confirmed to be on Eliquis 5mg BID PO for 

elevated CHADSVasc in setting of Atrial fibrillation and she was started on 

Methimazole 10mg qdaily by Dr. Adkins (her endocrinologist) due to depressed 

TSH levels.





Minutes to complete discharge: 35





Discharge Summary


Problems reviewed: Yes


Reason For Visit: Biliary dyskinesia


Current Active Problems





Calculus of gallbladder without cholecystitis without obstruction (Acute)


Chronic systolic CHF (congestive heart failure) (Acute)


RUQ pain (Acute)


Troponin level elevated (Acute)


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 

current use of insulin (Acute)








Condition: Stable





- Instructions


Diet, Activity, Other Instructions: 


*******PLEASE TRANSLATE TO Mongolian***********





You were seen here for your abdominal pain. You had tests for your gallbladder 

and liver which did not show any blocking gallbladder stones. It did show 

however that you have a slow gallbladder that may be causing your pain.





MEDICATIONS: 


   Please continue your home medications as below:


      Digoxin 0.125mg daily


   Entresto TWICE daily


   Methimazole 10mg daily


   Eliquis 5mg TWICE daily as recommended by your primary care


   Toprol XL 50mg daily





   IT IS VERY IMPORTANT TO STAY AWAY FROM RICE, PASTAS, BREAD. Your blood sugar 

numbers have been uncontrolled, but are better when you are staying with a diet





Follow-up:


   Dr. Del Real in 1 week to follow-up on your care


   Please see Dr. Bond in 1-2 weeks about your gallbladder


   Dr. Garrett (surgeon) information has been given in this packet if you need to 

have your gallbladder out later on.


Referrals: 


Michael Garrett MD [Staff Physician] - 


Harsh Del Real PA [Primary Care Provider] - 1 Week (Repeat LFTs at next 

appointment as well)


Harsh Bond MD [Staff Physician] - 1 Week


Disposition: HOME





- Home Medications


Comprehensive Discharge Medication List: 


Ambulatory Orders





Digoxin [Lanoxin -] 0.125 mg PO DAILY 10/03/19 


Metoprolol Succinate [Toprol Xl] 50 mg PO DAILY 10/03/19 


Sacubitril/Valsartan [Entresto 24 mg-26 mg Tablet] 1 each PO BID 10/03/19 


Apixaban [Eliquis] 5 mg PO BID #60 tablet 10/08/19 


Methimazole [Tapazole -] 10 mg PO DAILY #0 tablet 10/08/19 








This patient is new to me today: No


Emergency Visit: Yes


ED Registration Date: 10/03/19


Care time: The patient presented to the Emergency Department on the above date 

and was hospitalized for further evaluation of their emergent condition.


Critical Care patient: No





- Discharge Referral


Referred to [facility] No

## 2020-08-26 ENCOUNTER — HOSPITAL ENCOUNTER (OUTPATIENT)
Dept: HOSPITAL 74 - FASU-ENDO | Age: 85
Discharge: HOME | End: 2020-08-26
Attending: INTERNAL MEDICINE
Payer: COMMERCIAL

## 2020-08-26 VITALS — TEMPERATURE: 97.9 F

## 2020-08-26 VITALS — BODY MASS INDEX: 31 KG/M2

## 2020-08-26 VITALS — HEART RATE: 64 BPM | DIASTOLIC BLOOD PRESSURE: 58 MMHG | SYSTOLIC BLOOD PRESSURE: 124 MMHG

## 2020-08-26 DIAGNOSIS — Z13.810: Primary | ICD-10-CM

## 2020-08-26 DIAGNOSIS — K29.70: ICD-10-CM

## 2020-08-26 PROCEDURE — 0DJ08ZZ INSPECTION OF UPPER INTESTINAL TRACT, VIA NATURAL OR ARTIFICIAL OPENING ENDOSCOPIC: ICD-10-PCS | Performed by: INTERNAL MEDICINE

## 2022-07-14 ENCOUNTER — HOSPITAL ENCOUNTER (INPATIENT)
Dept: HOSPITAL 74 - JER | Age: 87
LOS: 6 days | Discharge: NURSING, REHAB OR LONG-TERM CARE | DRG: 291 | End: 2022-07-20
Attending: STUDENT IN AN ORGANIZED HEALTH CARE EDUCATION/TRAINING PROGRAM | Admitting: STUDENT IN AN ORGANIZED HEALTH CARE EDUCATION/TRAINING PROGRAM
Payer: COMMERCIAL

## 2022-07-14 VITALS — BODY MASS INDEX: 39.6 KG/M2

## 2022-07-14 DIAGNOSIS — N18.2: ICD-10-CM

## 2022-07-14 DIAGNOSIS — I48.92: ICD-10-CM

## 2022-07-14 DIAGNOSIS — E78.5: ICD-10-CM

## 2022-07-14 DIAGNOSIS — I13.0: Primary | ICD-10-CM

## 2022-07-14 DIAGNOSIS — K21.9: ICD-10-CM

## 2022-07-14 DIAGNOSIS — I50.23: ICD-10-CM

## 2022-07-14 DIAGNOSIS — E87.70: ICD-10-CM

## 2022-07-14 DIAGNOSIS — E11.9: ICD-10-CM

## 2022-07-14 DIAGNOSIS — E66.01: ICD-10-CM

## 2022-07-14 DIAGNOSIS — I24.8: ICD-10-CM

## 2022-07-14 DIAGNOSIS — I48.91: ICD-10-CM

## 2022-07-14 LAB
ALBUMIN SERPL-MCNC: 3.4 G/DL (ref 3.4–5)
ALP SERPL-CCNC: 188 U/L (ref 45–117)
ALT SERPL-CCNC: 21 U/L (ref 13–61)
ANION GAP SERPL CALC-SCNC: 5 MMOL/L (ref 8–16)
APTT BLD: 39.3 SECONDS (ref 25.2–36.5)
APTT BLD: 44.6 SECONDS (ref 25.2–36.5)
AST SERPL-CCNC: 22 U/L (ref 15–37)
BASOPHILS # BLD: 0.6 % (ref 0–2)
BILIRUB SERPL-MCNC: 2 MG/DL (ref 0.2–1)
BNP SERPL-MCNC: 6361.7 PG/ML (ref 5–450)
BUN SERPL-MCNC: 20 MG/DL (ref 7–18)
CALCIUM SERPL-MCNC: 9.1 MG/DL (ref 8.5–10.1)
CHLORIDE SERPL-SCNC: 106 MMOL/L (ref 98–107)
CO2 SERPL-SCNC: 29 MMOL/L (ref 21–32)
CREAT SERPL-MCNC: 1 MG/DL (ref 0.55–1.3)
DEPRECATED RDW RBC AUTO: 17.1 % (ref 11.6–15.6)
EOSINOPHIL # BLD: 1 % (ref 0–4.5)
GLUCOSE SERPL-MCNC: 77 MG/DL (ref 74–106)
HCT VFR BLD CALC: 35 % (ref 32.4–45.2)
HGB BLD-MCNC: 11 GM/DL (ref 10.7–15.3)
INR BLD: 2.61 (ref 0.83–1.09)
INR BLD: 3.5 (ref 0.83–1.09)
LIPASE SERPL-CCNC: 58 U/L (ref 73–393)
LYMPHOCYTES # BLD: 15.9 % (ref 8–40)
MAGNESIUM SERPL-MCNC: 2.1 MG/DL (ref 1.8–2.4)
MCH RBC QN AUTO: 25.7 PG (ref 25.7–33.7)
MCHC RBC AUTO-ENTMCNC: 31.4 G/DL (ref 32–36)
MCV RBC: 81.8 FL (ref 80–96)
MONOCYTES # BLD AUTO: 5.9 % (ref 3.8–10.2)
NEUTROPHILS # BLD: 76.6 % (ref 42.8–82.8)
PLATELET # BLD AUTO: 165 10^3/UL (ref 134–434)
PMV BLD: 8.3 FL (ref 7.5–11.1)
PROT SERPL-MCNC: 6.5 G/DL (ref 6.4–8.2)
PT PNL PPP: 30.3 SEC (ref 9.7–13)
PT PNL PPP: 40.8 SEC (ref 9.7–13)
RBC # BLD AUTO: 4.28 M/MM3 (ref 3.6–5.2)
SODIUM SERPL-SCNC: 140 MMOL/L (ref 136–145)
WBC # BLD AUTO: 7 K/MM3 (ref 4–10)

## 2022-07-14 PROCEDURE — U0003 INFECTIOUS AGENT DETECTION BY NUCLEIC ACID (DNA OR RNA); SEVERE ACUTE RESPIRATORY SYNDROME CORONAVIRUS 2 (SARS-COV-2) (CORONAVIRUS DISEASE [COVID-19]), AMPLIFIED PROBE TECHNIQUE, MAKING USE OF HIGH THROUGHPUT TECHNOLOGIES AS DESCRIBED BY CMS-2020-01-R: HCPCS

## 2022-07-14 PROCEDURE — U0005 INFEC AGEN DETEC AMPLI PROBE: HCPCS

## 2022-07-14 RX ADMIN — INSULIN ASPART SCH: 100 INJECTION, SOLUTION INTRAVENOUS; SUBCUTANEOUS at 20:38

## 2022-07-14 RX ADMIN — SACUBITRIL AND VALSARTAN SCH TAB: 24; 26 TABLET, FILM COATED ORAL at 23:14

## 2022-07-14 RX ADMIN — INSULIN ASPART SCH: 100 INJECTION, SOLUTION INTRAVENOUS; SUBCUTANEOUS at 23:15

## 2022-07-15 LAB
ALBUMIN SERPL-MCNC: 3.4 G/DL (ref 3.4–5)
ALP SERPL-CCNC: 189 U/L (ref 45–117)
ALT SERPL-CCNC: 20 U/L (ref 13–61)
ANION GAP SERPL CALC-SCNC: 6 MMOL/L (ref 8–16)
AST SERPL-CCNC: 23 U/L (ref 15–37)
BILIRUB SERPL-MCNC: 2.4 MG/DL (ref 0.2–1)
BUN SERPL-MCNC: 21.2 MG/DL (ref 7–18)
CALCIUM SERPL-MCNC: 9.2 MG/DL (ref 8.5–10.1)
CHLORIDE SERPL-SCNC: 104 MMOL/L (ref 98–107)
CO2 SERPL-SCNC: 32 MMOL/L (ref 21–32)
CREAT SERPL-MCNC: 1 MG/DL (ref 0.55–1.3)
DEPRECATED RDW RBC AUTO: 17.5 % (ref 11.6–15.6)
GLUCOSE SERPL-MCNC: 54 MG/DL (ref 74–106)
HCT VFR BLD CALC: 38.5 % (ref 32.4–45.2)
HGB BLD-MCNC: 12 GM/DL (ref 10.7–15.3)
MAGNESIUM SERPL-MCNC: 2 MG/DL (ref 1.8–2.4)
MCH RBC QN AUTO: 26 PG (ref 25.7–33.7)
MCHC RBC AUTO-ENTMCNC: 31.1 G/DL (ref 32–36)
MCV RBC: 83.7 FL (ref 80–96)
PHOSPHATE SERPL-MCNC: 3.8 MG/DL (ref 2.5–4.9)
PLATELET # BLD AUTO: 166 10^3/UL (ref 134–434)
PMV BLD: 8.2 FL (ref 7.5–11.1)
PROT SERPL-MCNC: 6.9 G/DL (ref 6.4–8.2)
RBC # BLD AUTO: 4.6 M/MM3 (ref 3.6–5.2)
SODIUM SERPL-SCNC: 142 MMOL/L (ref 136–145)
WBC # BLD AUTO: 8.1 K/MM3 (ref 4–10)

## 2022-07-15 RX ADMIN — SACUBITRIL AND VALSARTAN SCH TAB: 24; 26 TABLET, FILM COATED ORAL at 23:57

## 2022-07-15 RX ADMIN — INSULIN ASPART SCH: 100 INJECTION, SOLUTION INTRAVENOUS; SUBCUTANEOUS at 14:06

## 2022-07-15 RX ADMIN — SACUBITRIL AND VALSARTAN SCH TAB: 24; 26 TABLET, FILM COATED ORAL at 09:35

## 2022-07-15 RX ADMIN — INSULIN ASPART SCH: 100 INJECTION, SOLUTION INTRAVENOUS; SUBCUTANEOUS at 07:14

## 2022-07-15 RX ADMIN — INSULIN ASPART SCH: 100 INJECTION, SOLUTION INTRAVENOUS; SUBCUTANEOUS at 22:33

## 2022-07-15 RX ADMIN — INSULIN ASPART SCH: 100 INJECTION, SOLUTION INTRAVENOUS; SUBCUTANEOUS at 17:47

## 2022-07-16 RX ADMIN — SPIRONOLACTONE SCH MG: 25 TABLET, FILM COATED ORAL at 11:28

## 2022-07-16 RX ADMIN — INSULIN ASPART SCH: 100 INJECTION, SOLUTION INTRAVENOUS; SUBCUTANEOUS at 16:44

## 2022-07-16 RX ADMIN — SACUBITRIL AND VALSARTAN SCH TAB: 24; 26 TABLET, FILM COATED ORAL at 21:10

## 2022-07-16 RX ADMIN — INSULIN ASPART SCH: 100 INJECTION, SOLUTION INTRAVENOUS; SUBCUTANEOUS at 07:29

## 2022-07-16 RX ADMIN — RIVAROXABAN SCH MG: 20 TABLET, FILM COATED ORAL at 17:12

## 2022-07-16 RX ADMIN — METHIMAZOLE SCH MG: 5 TABLET ORAL at 11:29

## 2022-07-16 RX ADMIN — INSULIN ASPART SCH UNIT: 100 INJECTION, SOLUTION INTRAVENOUS; SUBCUTANEOUS at 11:56

## 2022-07-16 RX ADMIN — INSULIN ASPART SCH: 100 INJECTION, SOLUTION INTRAVENOUS; SUBCUTANEOUS at 21:17

## 2022-07-16 RX ADMIN — FUROSEMIDE SCH MG: 10 INJECTION, SOLUTION INTRAVENOUS at 11:28

## 2022-07-16 RX ADMIN — ATORVASTATIN CALCIUM SCH MG: 20 TABLET, FILM COATED ORAL at 11:28

## 2022-07-16 RX ADMIN — SACUBITRIL AND VALSARTAN SCH TAB: 24; 26 TABLET, FILM COATED ORAL at 11:29

## 2022-07-17 LAB
ANION GAP SERPL CALC-SCNC: 5 MMOL/L (ref 8–16)
BUN SERPL-MCNC: 22.6 MG/DL (ref 7–18)
CALCIUM SERPL-MCNC: 8.9 MG/DL (ref 8.5–10.1)
CHLORIDE SERPL-SCNC: 102 MMOL/L (ref 98–107)
CO2 SERPL-SCNC: 33 MMOL/L (ref 21–32)
CREAT SERPL-MCNC: 1 MG/DL (ref 0.55–1.3)
DEPRECATED RDW RBC AUTO: 17.2 % (ref 11.6–15.6)
GLUCOSE SERPL-MCNC: 80 MG/DL (ref 74–106)
HCT VFR BLD CALC: 35.1 % (ref 32.4–45.2)
HGB BLD-MCNC: 10.9 GM/DL (ref 10.7–15.3)
MCH RBC QN AUTO: 25.8 PG (ref 25.7–33.7)
MCHC RBC AUTO-ENTMCNC: 31.1 G/DL (ref 32–36)
MCV RBC: 83 FL (ref 80–96)
PLATELET # BLD AUTO: 165 10^3/UL (ref 134–434)
PMV BLD: 8.6 FL (ref 7.5–11.1)
RBC # BLD AUTO: 4.22 M/MM3 (ref 3.6–5.2)
SODIUM SERPL-SCNC: 140 MMOL/L (ref 136–145)
WBC # BLD AUTO: 7.8 K/MM3 (ref 4–10)

## 2022-07-17 RX ADMIN — SPIRONOLACTONE SCH MG: 25 TABLET, FILM COATED ORAL at 10:23

## 2022-07-17 RX ADMIN — ATORVASTATIN CALCIUM SCH MG: 20 TABLET, FILM COATED ORAL at 10:23

## 2022-07-17 RX ADMIN — INSULIN ASPART SCH: 100 INJECTION, SOLUTION INTRAVENOUS; SUBCUTANEOUS at 21:14

## 2022-07-17 RX ADMIN — SACUBITRIL AND VALSARTAN SCH TAB: 24; 26 TABLET, FILM COATED ORAL at 21:15

## 2022-07-17 RX ADMIN — INSULIN ASPART SCH: 100 INJECTION, SOLUTION INTRAVENOUS; SUBCUTANEOUS at 17:46

## 2022-07-17 RX ADMIN — METHIMAZOLE SCH MG: 5 TABLET ORAL at 10:24

## 2022-07-17 RX ADMIN — FUROSEMIDE SCH MG: 10 INJECTION, SOLUTION INTRAVENOUS at 10:23

## 2022-07-17 RX ADMIN — SACUBITRIL AND VALSARTAN SCH TAB: 24; 26 TABLET, FILM COATED ORAL at 10:24

## 2022-07-17 RX ADMIN — INSULIN ASPART SCH UNIT: 100 INJECTION, SOLUTION INTRAVENOUS; SUBCUTANEOUS at 13:17

## 2022-07-17 RX ADMIN — RIVAROXABAN SCH MG: 20 TABLET, FILM COATED ORAL at 17:47

## 2022-07-17 RX ADMIN — INSULIN ASPART SCH: 100 INJECTION, SOLUTION INTRAVENOUS; SUBCUTANEOUS at 06:01

## 2022-07-18 RX ADMIN — INSULIN ASPART SCH UNIT: 100 INJECTION, SOLUTION INTRAVENOUS; SUBCUTANEOUS at 11:40

## 2022-07-18 RX ADMIN — RIVAROXABAN SCH MG: 20 TABLET, FILM COATED ORAL at 17:27

## 2022-07-18 RX ADMIN — METHIMAZOLE SCH MG: 5 TABLET ORAL at 09:26

## 2022-07-18 RX ADMIN — INSULIN ASPART SCH UNIT: 100 INJECTION, SOLUTION INTRAVENOUS; SUBCUTANEOUS at 21:06

## 2022-07-18 RX ADMIN — INSULIN ASPART SCH: 100 INJECTION, SOLUTION INTRAVENOUS; SUBCUTANEOUS at 17:25

## 2022-07-18 RX ADMIN — SPIRONOLACTONE SCH MG: 25 TABLET, FILM COATED ORAL at 09:25

## 2022-07-18 RX ADMIN — FUROSEMIDE SCH MG: 10 INJECTION, SOLUTION INTRAVENOUS at 09:25

## 2022-07-18 RX ADMIN — SACUBITRIL AND VALSARTAN SCH TAB: 24; 26 TABLET, FILM COATED ORAL at 09:26

## 2022-07-18 RX ADMIN — INSULIN ASPART SCH: 100 INJECTION, SOLUTION INTRAVENOUS; SUBCUTANEOUS at 06:12

## 2022-07-18 RX ADMIN — SACUBITRIL AND VALSARTAN SCH TAB: 24; 26 TABLET, FILM COATED ORAL at 21:06

## 2022-07-18 RX ADMIN — ATORVASTATIN CALCIUM SCH MG: 20 TABLET, FILM COATED ORAL at 09:25

## 2022-07-19 LAB
ANION GAP SERPL CALC-SCNC: 6 MMOL/L (ref 8–16)
BASOPHILS # BLD: 0.6 % (ref 0–2)
BUN SERPL-MCNC: 26.8 MG/DL (ref 7–18)
CALCIUM SERPL-MCNC: 9.1 MG/DL (ref 8.5–10.1)
CHLORIDE SERPL-SCNC: 100 MMOL/L (ref 98–107)
CO2 SERPL-SCNC: 33 MMOL/L (ref 21–32)
CREAT SERPL-MCNC: 1 MG/DL (ref 0.55–1.3)
DEPRECATED RDW RBC AUTO: 17.3 % (ref 11.6–15.6)
EOSINOPHIL # BLD: 3.5 % (ref 0–4.5)
GLUCOSE SERPL-MCNC: 131 MG/DL (ref 74–106)
HCT VFR BLD CALC: 34.5 % (ref 32.4–45.2)
HGB BLD-MCNC: 10.9 GM/DL (ref 10.7–15.3)
LYMPHOCYTES # BLD: 18.2 % (ref 8–40)
MAGNESIUM SERPL-MCNC: 1.8 MG/DL (ref 1.8–2.4)
MCH RBC QN AUTO: 26 PG (ref 25.7–33.7)
MCHC RBC AUTO-ENTMCNC: 31.6 G/DL (ref 32–36)
MCV RBC: 82.5 FL (ref 80–96)
MONOCYTES # BLD AUTO: 7.3 % (ref 3.8–10.2)
NEUTROPHILS # BLD: 70.4 % (ref 42.8–82.8)
PHOSPHATE SERPL-MCNC: 2.6 MG/DL (ref 2.5–4.9)
PLATELET # BLD AUTO: 157 10^3/UL (ref 134–434)
PMV BLD: 8.4 FL (ref 7.5–11.1)
RBC # BLD AUTO: 4.19 M/MM3 (ref 3.6–5.2)
SODIUM SERPL-SCNC: 140 MMOL/L (ref 136–145)
WBC # BLD AUTO: 7.4 K/MM3 (ref 4–10)

## 2022-07-19 RX ADMIN — SPIRONOLACTONE SCH MG: 25 TABLET, FILM COATED ORAL at 10:08

## 2022-07-19 RX ADMIN — INSULIN ASPART SCH: 100 INJECTION, SOLUTION INTRAVENOUS; SUBCUTANEOUS at 06:15

## 2022-07-19 RX ADMIN — INSULIN ASPART SCH: 100 INJECTION, SOLUTION INTRAVENOUS; SUBCUTANEOUS at 10:46

## 2022-07-19 RX ADMIN — ATORVASTATIN CALCIUM SCH MG: 20 TABLET, FILM COATED ORAL at 10:08

## 2022-07-19 RX ADMIN — INSULIN ASPART SCH UNIT: 100 INJECTION, SOLUTION INTRAVENOUS; SUBCUTANEOUS at 16:59

## 2022-07-19 RX ADMIN — RIVAROXABAN SCH MG: 20 TABLET, FILM COATED ORAL at 17:38

## 2022-07-19 RX ADMIN — INSULIN ASPART SCH UNIT: 100 INJECTION, SOLUTION INTRAVENOUS; SUBCUTANEOUS at 21:34

## 2022-07-19 RX ADMIN — SACUBITRIL AND VALSARTAN SCH TAB: 24; 26 TABLET, FILM COATED ORAL at 10:08

## 2022-07-19 RX ADMIN — INSULIN ASPART SCH: 100 INJECTION, SOLUTION INTRAVENOUS; SUBCUTANEOUS at 16:51

## 2022-07-19 RX ADMIN — METHIMAZOLE SCH MG: 5 TABLET ORAL at 10:08

## 2022-07-19 RX ADMIN — FUROSEMIDE SCH MG: 40 TABLET ORAL at 10:08

## 2022-07-19 RX ADMIN — SACUBITRIL AND VALSARTAN SCH TAB: 24; 26 TABLET, FILM COATED ORAL at 21:30

## 2022-07-20 VITALS — DIASTOLIC BLOOD PRESSURE: 66 MMHG | TEMPERATURE: 97.9 F | HEART RATE: 98 BPM | SYSTOLIC BLOOD PRESSURE: 102 MMHG

## 2022-07-20 LAB
ANION GAP SERPL CALC-SCNC: 5 MMOL/L (ref 8–16)
BASOPHILS # BLD: 0.7 % (ref 0–2)
BUN SERPL-MCNC: 29.6 MG/DL (ref 7–18)
CALCIUM SERPL-MCNC: 8.9 MG/DL (ref 8.5–10.1)
CHLORIDE SERPL-SCNC: 102 MMOL/L (ref 98–107)
CO2 SERPL-SCNC: 32 MMOL/L (ref 21–32)
CREAT SERPL-MCNC: 1.1 MG/DL (ref 0.55–1.3)
DEPRECATED RDW RBC AUTO: 17.8 % (ref 11.6–15.6)
EOSINOPHIL # BLD: 2.8 % (ref 0–4.5)
GLUCOSE SERPL-MCNC: 185 MG/DL (ref 74–106)
HCT VFR BLD CALC: 34.8 % (ref 32.4–45.2)
HGB BLD-MCNC: 11 GM/DL (ref 10.7–15.3)
LYMPHOCYTES # BLD: 13.1 % (ref 8–40)
MCH RBC QN AUTO: 26 PG (ref 25.7–33.7)
MCHC RBC AUTO-ENTMCNC: 31.6 G/DL (ref 32–36)
MCV RBC: 82.5 FL (ref 80–96)
MONOCYTES # BLD AUTO: 6.3 % (ref 3.8–10.2)
NEUTROPHILS # BLD: 77.1 % (ref 42.8–82.8)
PLATELET # BLD AUTO: 148 10^3/UL (ref 134–434)
PMV BLD: 8.6 FL (ref 7.5–11.1)
RBC # BLD AUTO: 4.21 M/MM3 (ref 3.6–5.2)
SODIUM SERPL-SCNC: 140 MMOL/L (ref 136–145)
WBC # BLD AUTO: 7.3 K/MM3 (ref 4–10)

## 2022-07-20 RX ADMIN — SACUBITRIL AND VALSARTAN SCH TAB: 24; 26 TABLET, FILM COATED ORAL at 09:28

## 2022-07-20 RX ADMIN — SPIRONOLACTONE SCH MG: 25 TABLET, FILM COATED ORAL at 09:28

## 2022-07-20 RX ADMIN — INSULIN ASPART SCH UNIT: 100 INJECTION, SOLUTION INTRAVENOUS; SUBCUTANEOUS at 11:12

## 2022-07-20 RX ADMIN — METHIMAZOLE SCH MG: 5 TABLET ORAL at 09:28

## 2022-07-20 RX ADMIN — FUROSEMIDE SCH MG: 40 TABLET ORAL at 09:28

## 2022-07-20 RX ADMIN — INSULIN ASPART SCH: 100 INJECTION, SOLUTION INTRAVENOUS; SUBCUTANEOUS at 06:01

## 2022-07-20 RX ADMIN — ATORVASTATIN CALCIUM SCH MG: 20 TABLET, FILM COATED ORAL at 09:28

## 2023-04-03 ENCOUNTER — HOSPITAL ENCOUNTER (INPATIENT)
Dept: HOSPITAL 74 - JER | Age: 88
LOS: 7 days | Discharge: HOME | DRG: 637 | End: 2023-04-10
Attending: INTERNAL MEDICINE | Admitting: STUDENT IN AN ORGANIZED HEALTH CARE EDUCATION/TRAINING PROGRAM
Payer: COMMERCIAL

## 2023-04-03 VITALS — BODY MASS INDEX: 43.9 KG/M2

## 2023-04-03 DIAGNOSIS — I11.0: ICD-10-CM

## 2023-04-03 DIAGNOSIS — M54.50: ICD-10-CM

## 2023-04-03 DIAGNOSIS — K81.0: ICD-10-CM

## 2023-04-03 DIAGNOSIS — E66.9: ICD-10-CM

## 2023-04-03 DIAGNOSIS — E78.5: ICD-10-CM

## 2023-04-03 DIAGNOSIS — I50.33: ICD-10-CM

## 2023-04-03 DIAGNOSIS — I07.1: ICD-10-CM

## 2023-04-03 DIAGNOSIS — I48.11: ICD-10-CM

## 2023-04-03 DIAGNOSIS — J44.9: ICD-10-CM

## 2023-04-03 DIAGNOSIS — K82.8: ICD-10-CM

## 2023-04-03 DIAGNOSIS — E11.649: Primary | ICD-10-CM

## 2023-04-03 DIAGNOSIS — F03.90: ICD-10-CM

## 2023-04-03 DIAGNOSIS — E66.01: ICD-10-CM

## 2023-04-03 DIAGNOSIS — E05.80: ICD-10-CM

## 2023-04-03 DIAGNOSIS — K21.9: ICD-10-CM

## 2023-04-03 LAB
ALBUMIN SERPL-MCNC: 3.2 G/DL (ref 3.4–5)
ALP SERPL-CCNC: 176 U/L (ref 45–117)
ALT SERPL-CCNC: 23 U/L (ref 13–61)
ANION GAP SERPL CALC-SCNC: 6 MMOL/L (ref 8–16)
APPEARANCE UR: CLEAR
APTT BLD: 40.7 SECONDS (ref 25.2–36.5)
AST SERPL-CCNC: 29 U/L (ref 15–37)
BASE EXCESS BLDV CALC-SCNC: 0.6 MMOL/L (ref -2–2)
BASOPHILS # BLD: 0.4 % (ref 0–2)
BILIRUB SERPL-MCNC: 1.3 MG/DL (ref 0.2–1)
BILIRUB UR STRIP.AUTO-MCNC: NEGATIVE MG/DL
BNP SERPL-MCNC: 9001.3 PG/ML (ref 5–450)
BUN SERPL-MCNC: 34.2 MG/DL (ref 7–18)
CALCIUM SERPL-MCNC: 9.3 MG/DL (ref 8.5–10.1)
CHLORIDE SERPL-SCNC: 103 MMOL/L (ref 98–107)
CO2 SERPL-SCNC: 32 MMOL/L (ref 21–32)
COLOR UR: YELLOW
CREAT SERPL-MCNC: 1.2 MG/DL (ref 0.55–1.3)
DEPRECATED RDW RBC AUTO: 16.3 % (ref 11.6–15.6)
EOSINOPHIL # BLD: 0.5 % (ref 0–4.5)
GLUCOSE SERPL-MCNC: 57 MG/DL (ref 74–106)
HCT VFR BLD CALC: 36.8 % (ref 32.4–45.2)
HCT VFR BLDV CALC: 36 % (ref 32.4–45.2)
HGB BLD-MCNC: 11.3 GM/DL (ref 10.7–15.3)
INR BLD: 2.95 (ref 0.83–1.09)
KETONES UR QL STRIP: NEGATIVE
LEUKOCYTE ESTERASE UR QL STRIP.AUTO: NEGATIVE
LYMPHOCYTES # BLD: 11.6 % (ref 8–40)
MCH RBC QN AUTO: 26.1 PG (ref 25.7–33.7)
MCHC RBC AUTO-ENTMCNC: 30.7 G/DL (ref 32–36)
MCV RBC: 84.9 FL (ref 80–96)
MONOCYTES # BLD AUTO: 7.3 % (ref 3.8–10.2)
NEUTROPHILS # BLD: 80.2 % (ref 42.8–82.8)
NITRITE UR QL STRIP: NEGATIVE
PCO2 BLDV: 55.8 MMHG (ref 38–52)
PH BLDV: 7.31 [PH] (ref 7.31–7.41)
PH UR: 5.5 [PH] (ref 5–8)
PLATELET # BLD AUTO: 159 10^3/UL (ref 134–434)
PMV BLD: 8.3 FL (ref 7.5–11.1)
PROT SERPL-MCNC: 6.6 G/DL (ref 6.4–8.2)
PROT UR QL STRIP: (no result)
PROT UR QL STRIP: NEGATIVE
PT PNL PPP: 33.8 SEC (ref 9.7–13)
RBC # BLD AUTO: 4.34 M/MM3 (ref 3.6–5.2)
SAO2 % BLDV: 80.2 % (ref 70–80)
SODIUM SERPL-SCNC: 141 MMOL/L (ref 136–145)
SP GR UR: 1.01 (ref 1.01–1.03)
UROBILINOGEN UR STRIP-MCNC: 0.2 MG/DL (ref 0.2–1)
WBC # BLD AUTO: 7.2 K/MM3 (ref 4–10)

## 2023-04-03 PROCEDURE — G0378 HOSPITAL OBSERVATION PER HR: HCPCS

## 2023-04-03 PROCEDURE — A9537 TC99M MEBROFENIN: HCPCS

## 2023-04-03 RX ADMIN — ATORVASTATIN CALCIUM SCH MG: 20 TABLET, FILM COATED ORAL at 21:43

## 2023-04-04 LAB
ALBUMIN SERPL-MCNC: 2.9 G/DL (ref 3.4–5)
ALP SERPL-CCNC: 163 U/L (ref 45–117)
ALT SERPL-CCNC: 20 U/L (ref 13–61)
ANION GAP SERPL CALC-SCNC: 5 MMOL/L (ref 8–16)
AST SERPL-CCNC: 23 U/L (ref 15–37)
BILIRUB SERPL-MCNC: 1.2 MG/DL (ref 0.2–1)
BUN SERPL-MCNC: 32.1 MG/DL (ref 7–18)
CALCIUM SERPL-MCNC: 9 MG/DL (ref 8.5–10.1)
CHLORIDE SERPL-SCNC: 105 MMOL/L (ref 98–107)
CO2 SERPL-SCNC: 34 MMOL/L (ref 21–32)
CREAT SERPL-MCNC: 1.2 MG/DL (ref 0.55–1.3)
DEPRECATED RDW RBC AUTO: 15.8 % (ref 11.6–15.6)
GLUCOSE SERPL-MCNC: 93 MG/DL (ref 74–106)
HCT VFR BLD CALC: 34 % (ref 32.4–45.2)
HGB BLD-MCNC: 10.9 GM/DL (ref 10.7–15.3)
MAGNESIUM SERPL-MCNC: 2.3 MG/DL (ref 1.8–2.4)
MCH RBC QN AUTO: 26.7 PG (ref 25.7–33.7)
MCHC RBC AUTO-ENTMCNC: 32 G/DL (ref 32–36)
MCV RBC: 83.4 FL (ref 80–96)
PHOSPHATE SERPL-MCNC: 3.4 MG/DL (ref 2.5–4.9)
PLATELET # BLD AUTO: 142 10^3/UL (ref 134–434)
PMV BLD: 8.6 FL (ref 7.5–11.1)
PROT SERPL-MCNC: 6 G/DL (ref 6.4–8.2)
RBC # BLD AUTO: 4.08 M/MM3 (ref 3.6–5.2)
SODIUM SERPL-SCNC: 145 MMOL/L (ref 136–145)
WBC # BLD AUTO: 5.7 K/MM3 (ref 4–10)

## 2023-04-04 RX ADMIN — RIVAROXABAN SCH MG: 20 TABLET, FILM COATED ORAL at 18:00

## 2023-04-04 RX ADMIN — ATORVASTATIN CALCIUM SCH MG: 20 TABLET, FILM COATED ORAL at 21:28

## 2023-04-04 RX ADMIN — FUROSEMIDE SCH MG: 10 INJECTION, SOLUTION INTRAVENOUS at 06:50

## 2023-04-04 RX ADMIN — SPIRONOLACTONE SCH MG: 25 TABLET, FILM COATED ORAL at 10:09

## 2023-04-04 RX ADMIN — FUROSEMIDE SCH MG: 10 INJECTION, SOLUTION INTRAVENOUS at 14:13

## 2023-04-04 RX ADMIN — METHIMAZOLE SCH MG: 5 TABLET ORAL at 11:16

## 2023-04-05 LAB
ALBUMIN SERPL-MCNC: 3 G/DL (ref 3.4–5)
ALP SERPL-CCNC: 158 U/L (ref 45–117)
ALT SERPL-CCNC: 21 U/L (ref 13–61)
ANION GAP SERPL CALC-SCNC: 1 MMOL/L (ref 8–16)
AST SERPL-CCNC: 20 U/L (ref 15–37)
BASOPHILS # BLD: 0.8 % (ref 0–2)
BILIRUB SERPL-MCNC: 1.6 MG/DL (ref 0.2–1)
BUN SERPL-MCNC: 31.9 MG/DL (ref 7–18)
CALCIUM SERPL-MCNC: 9.2 MG/DL (ref 8.5–10.1)
CHLORIDE SERPL-SCNC: 101 MMOL/L (ref 98–107)
CO2 SERPL-SCNC: 38 MMOL/L (ref 21–32)
CREAT SERPL-MCNC: 1.1 MG/DL (ref 0.55–1.3)
DEPRECATED RDW RBC AUTO: 15.5 % (ref 11.6–15.6)
EOSINOPHIL # BLD: 3.9 % (ref 0–4.5)
GLUCOSE SERPL-MCNC: 75 MG/DL (ref 74–106)
HCT VFR BLD CALC: 32.7 % (ref 32.4–45.2)
HGB BLD-MCNC: 10.5 GM/DL (ref 10.7–15.3)
LYMPHOCYTES # BLD: 19.8 % (ref 8–40)
MAGNESIUM SERPL-MCNC: 2.1 MG/DL (ref 1.8–2.4)
MCH RBC QN AUTO: 26.7 PG (ref 25.7–33.7)
MCHC RBC AUTO-ENTMCNC: 32.2 G/DL (ref 32–36)
MCV RBC: 83 FL (ref 80–96)
MONOCYTES # BLD AUTO: 9.7 % (ref 3.8–10.2)
NEUTROPHILS # BLD: 65.8 % (ref 42.8–82.8)
PHOSPHATE SERPL-MCNC: 2.7 MG/DL (ref 2.5–4.9)
PLATELET # BLD AUTO: 145 10^3/UL (ref 134–434)
PMV BLD: 8.7 FL (ref 7.5–11.1)
PROT SERPL-MCNC: 6.1 G/DL (ref 6.4–8.2)
RBC # BLD AUTO: 3.95 M/MM3 (ref 3.6–5.2)
SODIUM SERPL-SCNC: 140 MMOL/L (ref 136–145)
WBC # BLD AUTO: 7 K/MM3 (ref 4–10)

## 2023-04-05 RX ADMIN — METHIMAZOLE SCH MG: 5 TABLET ORAL at 09:30

## 2023-04-05 RX ADMIN — FUROSEMIDE SCH MG: 10 INJECTION, SOLUTION INTRAVENOUS at 13:56

## 2023-04-05 RX ADMIN — ATORVASTATIN CALCIUM SCH MG: 20 TABLET, FILM COATED ORAL at 21:28

## 2023-04-05 RX ADMIN — RIVAROXABAN SCH MG: 20 TABLET, FILM COATED ORAL at 17:47

## 2023-04-05 RX ADMIN — SPIRONOLACTONE SCH MG: 25 TABLET, FILM COATED ORAL at 09:30

## 2023-04-05 RX ADMIN — FUROSEMIDE SCH MG: 10 INJECTION, SOLUTION INTRAVENOUS at 06:16

## 2023-04-06 LAB
ALBUMIN SERPL-MCNC: 2.8 G/DL (ref 3.4–5)
ALP SERPL-CCNC: 151 U/L (ref 45–117)
ALT SERPL-CCNC: 15 U/L (ref 13–61)
ANION GAP SERPL CALC-SCNC: 3 MMOL/L (ref 8–16)
AST SERPL-CCNC: 14 U/L (ref 15–37)
BASOPHILS # BLD: 1.1 % (ref 0–2)
BILIRUB CONJ SERPL-MCNC: 0.5 MG/DL (ref 0–0.2)
BILIRUB SERPL-MCNC: 1.9 MG/DL (ref 0.2–1)
BUN SERPL-MCNC: 29.3 MG/DL (ref 7–18)
CALCIUM SERPL-MCNC: 9.5 MG/DL (ref 8.5–10.1)
CHLORIDE SERPL-SCNC: 100 MMOL/L (ref 98–107)
CO2 SERPL-SCNC: 38 MMOL/L (ref 21–32)
CREAT SERPL-MCNC: 1.1 MG/DL (ref 0.55–1.3)
DEPRECATED RDW RBC AUTO: 16 % (ref 11.6–15.6)
EOSINOPHIL # BLD: 1.3 % (ref 0–4.5)
GLUCOSE SERPL-MCNC: 125 MG/DL (ref 74–106)
HCT VFR BLD CALC: 33.1 % (ref 32.4–45.2)
HGB BLD-MCNC: 10.7 GM/DL (ref 10.7–15.3)
LYMPHOCYTES # BLD: 13.8 % (ref 8–40)
MAGNESIUM SERPL-MCNC: 2 MG/DL (ref 1.8–2.4)
MCH RBC QN AUTO: 26.5 PG (ref 25.7–33.7)
MCHC RBC AUTO-ENTMCNC: 32.3 G/DL (ref 32–36)
MCV RBC: 82.1 FL (ref 80–96)
MONOCYTES # BLD AUTO: 8 % (ref 3.8–10.2)
NEUTROPHILS # BLD: 75.8 % (ref 42.8–82.8)
PHOSPHATE SERPL-MCNC: 3 MG/DL (ref 2.5–4.9)
PLATELET # BLD AUTO: 143 10^3/UL (ref 134–434)
PMV BLD: 8.4 FL (ref 7.5–11.1)
PROT SERPL-MCNC: 6.1 G/DL (ref 6.4–8.2)
RBC # BLD AUTO: 4.03 M/MM3 (ref 3.6–5.2)
SODIUM SERPL-SCNC: 140 MMOL/L (ref 136–145)
WBC # BLD AUTO: 8.1 K/MM3 (ref 4–10)

## 2023-04-06 RX ADMIN — SERTRALINE HYDROCHLORIDE SCH MG: 25 TABLET ORAL at 09:26

## 2023-04-06 RX ADMIN — FUROSEMIDE SCH MG: 10 INJECTION, SOLUTION INTRAVENOUS at 05:55

## 2023-04-06 RX ADMIN — RIVAROXABAN SCH MG: 20 TABLET, FILM COATED ORAL at 17:49

## 2023-04-06 RX ADMIN — SPIRONOLACTONE SCH MG: 25 TABLET, FILM COATED ORAL at 09:06

## 2023-04-06 RX ADMIN — CEFTRIAXONE SCH MLS/HR: 1 INJECTION, POWDER, FOR SOLUTION INTRAMUSCULAR; INTRAVENOUS at 17:45

## 2023-04-06 RX ADMIN — ATORVASTATIN CALCIUM SCH MG: 20 TABLET, FILM COATED ORAL at 21:40

## 2023-04-06 RX ADMIN — METHIMAZOLE SCH MG: 5 TABLET ORAL at 09:06

## 2023-04-06 RX ADMIN — FUROSEMIDE SCH MG: 10 INJECTION, SOLUTION INTRAVENOUS at 14:01

## 2023-04-07 LAB
ALBUMIN SERPL-MCNC: 2.7 G/DL (ref 3.4–5)
ALP SERPL-CCNC: 143 U/L (ref 45–117)
ALT SERPL-CCNC: 13 U/L (ref 13–61)
ANION GAP SERPL CALC-SCNC: 4 MMOL/L (ref 8–16)
AST SERPL-CCNC: 16 U/L (ref 15–37)
BASOPHILS # BLD: 0.5 % (ref 0–2)
BILIRUB CONJ SERPL-MCNC: 0.6 MG/DL (ref 0–0.2)
BILIRUB SERPL-MCNC: 1.9 MG/DL (ref 0.2–1)
BUN SERPL-MCNC: 28.3 MG/DL (ref 7–18)
CALCIUM SERPL-MCNC: 9.2 MG/DL (ref 8.5–10.1)
CHLORIDE SERPL-SCNC: 98 MMOL/L (ref 98–107)
CO2 SERPL-SCNC: 39 MMOL/L (ref 21–32)
CREAT SERPL-MCNC: 1.2 MG/DL (ref 0.55–1.3)
DEPRECATED RDW RBC AUTO: 15.7 % (ref 11.6–15.6)
EOSINOPHIL # BLD: 2.8 % (ref 0–4.5)
GLUCOSE SERPL-MCNC: 122 MG/DL (ref 74–106)
HCT VFR BLD CALC: 34.2 % (ref 32.4–45.2)
HGB BLD-MCNC: 11.1 GM/DL (ref 10.7–15.3)
LYMPHOCYTES # BLD: 19.2 % (ref 8–40)
MAGNESIUM SERPL-MCNC: 1.8 MG/DL (ref 1.8–2.4)
MCH RBC QN AUTO: 26.6 PG (ref 25.7–33.7)
MCHC RBC AUTO-ENTMCNC: 32.3 G/DL (ref 32–36)
MCV RBC: 82.4 FL (ref 80–96)
MONOCYTES # BLD AUTO: 9.7 % (ref 3.8–10.2)
NEUTROPHILS # BLD: 67.8 % (ref 42.8–82.8)
PHOSPHATE SERPL-MCNC: 3.2 MG/DL (ref 2.5–4.9)
PLATELET # BLD AUTO: 144 10^3/UL (ref 134–434)
PMV BLD: 8.7 FL (ref 7.5–11.1)
PROT SERPL-MCNC: 6.1 G/DL (ref 6.4–8.2)
RBC # BLD AUTO: 4.15 M/MM3 (ref 3.6–5.2)
SODIUM SERPL-SCNC: 141 MMOL/L (ref 136–145)
WBC # BLD AUTO: 7.1 K/MM3 (ref 4–10)

## 2023-04-07 RX ADMIN — SERTRALINE HYDROCHLORIDE SCH MG: 25 TABLET ORAL at 10:04

## 2023-04-07 RX ADMIN — METHIMAZOLE SCH MG: 5 TABLET ORAL at 10:04

## 2023-04-07 RX ADMIN — RIVAROXABAN SCH MG: 20 TABLET, FILM COATED ORAL at 17:08

## 2023-04-07 RX ADMIN — FUROSEMIDE SCH MG: 10 INJECTION, SOLUTION INTRAVENOUS at 15:26

## 2023-04-07 RX ADMIN — FUROSEMIDE SCH MG: 10 INJECTION, SOLUTION INTRAVENOUS at 06:14

## 2023-04-07 RX ADMIN — ATORVASTATIN CALCIUM SCH MG: 20 TABLET, FILM COATED ORAL at 21:18

## 2023-04-07 RX ADMIN — PANTOPRAZOLE SODIUM SCH MG: 40 INJECTION, POWDER, FOR SOLUTION INTRAVENOUS at 10:03

## 2023-04-07 RX ADMIN — CEFTRIAXONE SCH MLS/HR: 1 INJECTION, POWDER, FOR SOLUTION INTRAMUSCULAR; INTRAVENOUS at 10:03

## 2023-04-08 LAB
ALBUMIN SERPL-MCNC: 2.8 G/DL (ref 3.4–5)
ALP SERPL-CCNC: 134 U/L (ref 45–117)
ALT SERPL-CCNC: 12 U/L (ref 13–61)
ANION GAP SERPL CALC-SCNC: 5 MMOL/L (ref 8–16)
AST SERPL-CCNC: 14 U/L (ref 15–37)
BILIRUB SERPL-MCNC: 2 MG/DL (ref 0.2–1)
BUN SERPL-MCNC: 30.2 MG/DL (ref 7–18)
CALCIUM SERPL-MCNC: 9.5 MG/DL (ref 8.5–10.1)
CHLORIDE SERPL-SCNC: 97 MMOL/L (ref 98–107)
CO2 SERPL-SCNC: 37 MMOL/L (ref 21–32)
CREAT SERPL-MCNC: 1.2 MG/DL (ref 0.55–1.3)
DEPRECATED RDW RBC AUTO: 16.3 % (ref 11.6–15.6)
GLUCOSE SERPL-MCNC: 129 MG/DL (ref 74–106)
HCT VFR BLD CALC: 32.9 % (ref 32.4–45.2)
HGB BLD-MCNC: 10.7 GM/DL (ref 10.7–15.3)
MAGNESIUM SERPL-MCNC: 1.6 MG/DL (ref 1.8–2.4)
MCH RBC QN AUTO: 26.5 PG (ref 25.7–33.7)
MCHC RBC AUTO-ENTMCNC: 32.6 G/DL (ref 32–36)
MCV RBC: 81.1 FL (ref 80–96)
PHOSPHATE SERPL-MCNC: 2.8 MG/DL (ref 2.5–4.9)
PLATELET # BLD AUTO: 153 10^3/UL (ref 134–434)
PMV BLD: 8.5 FL (ref 7.5–11.1)
PROT SERPL-MCNC: 6.2 G/DL (ref 6.4–8.2)
RBC # BLD AUTO: 4.05 M/MM3 (ref 3.6–5.2)
SODIUM SERPL-SCNC: 139 MMOL/L (ref 136–145)
WBC # BLD AUTO: 8.2 K/MM3 (ref 4–10)

## 2023-04-08 RX ADMIN — FUROSEMIDE SCH MG: 10 INJECTION, SOLUTION INTRAVENOUS at 14:28

## 2023-04-08 RX ADMIN — SERTRALINE HYDROCHLORIDE SCH MG: 25 TABLET ORAL at 09:12

## 2023-04-08 RX ADMIN — FUROSEMIDE SCH MG: 10 INJECTION, SOLUTION INTRAVENOUS at 05:33

## 2023-04-08 RX ADMIN — CEFTRIAXONE SCH MLS/HR: 1 INJECTION, POWDER, FOR SOLUTION INTRAMUSCULAR; INTRAVENOUS at 09:11

## 2023-04-08 RX ADMIN — ATORVASTATIN CALCIUM SCH MG: 20 TABLET, FILM COATED ORAL at 21:21

## 2023-04-08 RX ADMIN — METHIMAZOLE SCH MG: 5 TABLET ORAL at 09:12

## 2023-04-08 RX ADMIN — RIVAROXABAN SCH MG: 20 TABLET, FILM COATED ORAL at 17:21

## 2023-04-08 RX ADMIN — Medication SCH MG: at 16:22

## 2023-04-08 RX ADMIN — PANTOPRAZOLE SODIUM SCH MG: 40 INJECTION, POWDER, FOR SOLUTION INTRAVENOUS at 09:11

## 2023-04-09 LAB
ALBUMIN SERPL-MCNC: 2.6 G/DL (ref 3.4–5)
ALP SERPL-CCNC: 120 U/L (ref 45–117)
ALT SERPL-CCNC: 13 U/L (ref 13–61)
ANION GAP SERPL CALC-SCNC: 5 MMOL/L (ref 8–16)
AST SERPL-CCNC: 11 U/L (ref 15–37)
BILIRUB SERPL-MCNC: 1.5 MG/DL (ref 0.2–1)
BUN SERPL-MCNC: 31.1 MG/DL (ref 7–18)
CALCIUM SERPL-MCNC: 9.4 MG/DL (ref 8.5–10.1)
CHLORIDE SERPL-SCNC: 94 MMOL/L (ref 98–107)
CO2 SERPL-SCNC: 37 MMOL/L (ref 21–32)
CREAT SERPL-MCNC: 1.3 MG/DL (ref 0.55–1.3)
DEPRECATED RDW RBC AUTO: 16.4 % (ref 11.6–15.6)
GLUCOSE SERPL-MCNC: 123 MG/DL (ref 74–106)
HCT VFR BLD CALC: 31.1 % (ref 32.4–45.2)
HGB BLD-MCNC: 10.2 GM/DL (ref 10.7–15.3)
MAGNESIUM SERPL-MCNC: 1.9 MG/DL (ref 1.8–2.4)
MCH RBC QN AUTO: 26.3 PG (ref 25.7–33.7)
MCHC RBC AUTO-ENTMCNC: 32.7 G/DL (ref 32–36)
MCV RBC: 80.6 FL (ref 80–96)
PHOSPHATE SERPL-MCNC: 2.6 MG/DL (ref 2.5–4.9)
PLATELET # BLD AUTO: 151 10^3/UL (ref 134–434)
PMV BLD: 8.7 FL (ref 7.5–11.1)
PROT SERPL-MCNC: 6 G/DL (ref 6.4–8.2)
RBC # BLD AUTO: 3.86 M/MM3 (ref 3.6–5.2)
SODIUM SERPL-SCNC: 135 MMOL/L (ref 136–145)
WBC # BLD AUTO: 7.9 K/MM3 (ref 4–10)

## 2023-04-09 RX ADMIN — FUROSEMIDE SCH MG: 10 INJECTION, SOLUTION INTRAVENOUS at 05:58

## 2023-04-09 RX ADMIN — RIVAROXABAN SCH MG: 20 TABLET, FILM COATED ORAL at 18:07

## 2023-04-09 RX ADMIN — ATORVASTATIN CALCIUM SCH MG: 20 TABLET, FILM COATED ORAL at 22:20

## 2023-04-09 RX ADMIN — POLYETHYLENE GLYCOL 3350 SCH GM: 17 POWDER, FOR SOLUTION ORAL at 13:30

## 2023-04-09 RX ADMIN — POLYETHYLENE GLYCOL 3350 SCH GM: 17 POWDER, FOR SOLUTION ORAL at 22:20

## 2023-04-09 RX ADMIN — FUROSEMIDE SCH MG: 10 INJECTION, SOLUTION INTRAVENOUS at 13:30

## 2023-04-09 RX ADMIN — PANTOPRAZOLE SODIUM SCH MG: 40 TABLET, DELAYED RELEASE ORAL at 22:20

## 2023-04-09 RX ADMIN — Medication SCH MG: at 09:02

## 2023-04-09 RX ADMIN — SERTRALINE HYDROCHLORIDE SCH MG: 25 TABLET ORAL at 09:02

## 2023-04-09 RX ADMIN — PANTOPRAZOLE SODIUM SCH MG: 40 INJECTION, POWDER, FOR SOLUTION INTRAVENOUS at 09:01

## 2023-04-09 RX ADMIN — METHIMAZOLE SCH MG: 5 TABLET ORAL at 09:10

## 2023-04-09 RX ADMIN — PANTOPRAZOLE SODIUM SCH: 40 TABLET, DELAYED RELEASE ORAL at 12:16

## 2023-04-10 VITALS — HEART RATE: 87 BPM | TEMPERATURE: 98.6 F | DIASTOLIC BLOOD PRESSURE: 70 MMHG | SYSTOLIC BLOOD PRESSURE: 105 MMHG

## 2023-04-10 VITALS — RESPIRATION RATE: 20 BRPM

## 2023-04-10 LAB
ALBUMIN SERPL-MCNC: 2.5 G/DL (ref 3.4–5)
ALP SERPL-CCNC: 122 U/L (ref 45–117)
ALT SERPL-CCNC: 10 U/L (ref 13–61)
ANION GAP SERPL CALC-SCNC: 4 MMOL/L (ref 8–16)
AST SERPL-CCNC: 12 U/L (ref 15–37)
BILIRUB SERPL-MCNC: 0.9 MG/DL (ref 0.2–1)
BUN SERPL-MCNC: 32.8 MG/DL (ref 7–18)
CALCIUM SERPL-MCNC: 9.2 MG/DL (ref 8.5–10.1)
CHLORIDE SERPL-SCNC: 97 MMOL/L (ref 98–107)
CO2 SERPL-SCNC: 36 MMOL/L (ref 21–32)
CREAT SERPL-MCNC: 1.3 MG/DL (ref 0.55–1.3)
DEPRECATED RDW RBC AUTO: 16.3 % (ref 11.6–15.6)
GLUCOSE SERPL-MCNC: 133 MG/DL (ref 74–106)
HCT VFR BLD CALC: 30.7 % (ref 32.4–45.2)
HGB BLD-MCNC: 10 GM/DL (ref 10.7–15.3)
MAGNESIUM SERPL-MCNC: 1.9 MG/DL (ref 1.8–2.4)
MCH RBC QN AUTO: 26.3 PG (ref 25.7–33.7)
MCHC RBC AUTO-ENTMCNC: 32.5 G/DL (ref 32–36)
MCV RBC: 80.7 FL (ref 80–96)
PHOSPHATE SERPL-MCNC: 2.8 MG/DL (ref 2.5–4.9)
PLATELET # BLD AUTO: 147 10^3/UL (ref 134–434)
PMV BLD: 8.3 FL (ref 7.5–11.1)
PROT SERPL-MCNC: 5.8 G/DL (ref 6.4–8.2)
RBC # BLD AUTO: 3.8 M/MM3 (ref 3.6–5.2)
SODIUM SERPL-SCNC: 137 MMOL/L (ref 136–145)
WBC # BLD AUTO: 7.2 K/MM3 (ref 4–10)

## 2023-04-10 RX ADMIN — Medication SCH MG: at 09:51

## 2023-04-10 RX ADMIN — SERTRALINE HYDROCHLORIDE SCH MG: 25 TABLET ORAL at 09:50

## 2023-04-10 RX ADMIN — POLYETHYLENE GLYCOL 3350 SCH GM: 17 POWDER, FOR SOLUTION ORAL at 14:37

## 2023-04-10 RX ADMIN — METHIMAZOLE SCH MG: 5 TABLET ORAL at 09:52

## 2023-04-10 RX ADMIN — POLYETHYLENE GLYCOL 3350 SCH GM: 17 POWDER, FOR SOLUTION ORAL at 06:05

## 2023-04-10 RX ADMIN — PANTOPRAZOLE SODIUM SCH MG: 40 TABLET, DELAYED RELEASE ORAL at 09:50

## 2023-05-12 ENCOUNTER — HOSPITAL ENCOUNTER (INPATIENT)
Dept: HOSPITAL 74 - JER | Age: 88
LOS: 14 days | Discharge: NURSING, REHAB OR LONG-TERM CARE | DRG: 280 | End: 2023-05-26
Attending: GENERAL ACUTE CARE HOSPITAL | Admitting: INTERNAL MEDICINE
Payer: COMMERCIAL

## 2023-05-12 VITALS — BODY MASS INDEX: 34.9 KG/M2

## 2023-05-12 DIAGNOSIS — E87.20: ICD-10-CM

## 2023-05-12 DIAGNOSIS — I21.A1: ICD-10-CM

## 2023-05-12 DIAGNOSIS — J96.21: ICD-10-CM

## 2023-05-12 DIAGNOSIS — I50.33: ICD-10-CM

## 2023-05-12 DIAGNOSIS — N18.2: ICD-10-CM

## 2023-05-12 DIAGNOSIS — E78.5: ICD-10-CM

## 2023-05-12 DIAGNOSIS — I31.39: ICD-10-CM

## 2023-05-12 DIAGNOSIS — I13.0: Primary | ICD-10-CM

## 2023-05-12 DIAGNOSIS — J44.1: ICD-10-CM

## 2023-05-12 DIAGNOSIS — K21.9: ICD-10-CM

## 2023-05-12 DIAGNOSIS — E05.90: ICD-10-CM

## 2023-05-12 DIAGNOSIS — J81.1: ICD-10-CM

## 2023-05-12 DIAGNOSIS — E87.29: ICD-10-CM

## 2023-05-12 DIAGNOSIS — E66.2: ICD-10-CM

## 2023-05-12 DIAGNOSIS — I48.11: ICD-10-CM

## 2023-05-12 DIAGNOSIS — J90: ICD-10-CM

## 2023-05-12 DIAGNOSIS — I48.91: ICD-10-CM

## 2023-05-12 DIAGNOSIS — E11.22: ICD-10-CM

## 2023-05-12 DIAGNOSIS — E11.43: ICD-10-CM

## 2023-05-12 DIAGNOSIS — N17.9: ICD-10-CM

## 2023-05-12 DIAGNOSIS — R18.8: ICD-10-CM

## 2023-05-12 DIAGNOSIS — I48.92: ICD-10-CM

## 2023-05-12 LAB
ALBUMIN SERPL-MCNC: 3.3 G/DL (ref 3.4–5)
ALP SERPL-CCNC: 217 U/L (ref 45–117)
ALT SERPL-CCNC: 16 U/L (ref 13–61)
ANION GAP SERPL CALC-SCNC: 6 MMOL/L (ref 8–16)
APPEARANCE UR: CLEAR
ARTERIAL PATENCY WRIST A: POSITIVE
AST SERPL-CCNC: 29 U/L (ref 15–37)
BASE EXCESS BLDA CALC-SCNC: -2.7 MMOL/L (ref -2–2)
BASE EXCESS BLDV CALC-SCNC: -3 MMOL/L (ref -2–2)
BASOPHILS # BLD: 1.2 % (ref 0–2)
BILIRUB SERPL-MCNC: 1.5 MG/DL (ref 0.2–1)
BILIRUB UR STRIP.AUTO-MCNC: NEGATIVE MG/DL
BNP SERPL-MCNC: (no result) PG/ML (ref 5–450)
BREATHS.MECHANICAL ON VENT: 18
BUN SERPL-MCNC: 50.4 MG/DL (ref 7–18)
CALCIUM SERPL-MCNC: 9.5 MG/DL (ref 8.5–10.1)
CHLORIDE SERPL-SCNC: 105 MMOL/L (ref 98–107)
CO2 SERPL-SCNC: 29 MMOL/L (ref 21–32)
COLOR UR: YELLOW
CREAT SERPL-MCNC: 2.1 MG/DL (ref 0.55–1.3)
DEPRECATED RDW RBC AUTO: 18.8 % (ref 11.6–15.6)
EOSINOPHIL # BLD: 2 % (ref 0–4.5)
GLUCOSE SERPL-MCNC: 190 MG/DL (ref 74–106)
HCT VFR BLD CALC: 39.3 % (ref 32.4–45.2)
HCT VFR BLDV CALC: 37 % (ref 32.4–45.2)
HCT VFR BLDV CALC: 39 % (ref 32.4–45.2)
HGB BLD-MCNC: 11.8 GM/DL (ref 10.7–15.3)
KETONES UR QL STRIP: NEGATIVE
LEUKOCYTE ESTERASE UR QL STRIP.AUTO: NEGATIVE
LYMPHOCYTES # BLD: 15.8 % (ref 8–40)
MAGNESIUM SERPL-MCNC: 2.3 MG/DL (ref 1.8–2.4)
MCH RBC QN AUTO: 24 PG (ref 25.7–33.7)
MCHC RBC AUTO-ENTMCNC: 30.1 G/DL (ref 32–36)
MCV RBC: 79.5 FL (ref 80–96)
MONOCYTES # BLD AUTO: 9.2 % (ref 3.8–10.2)
NEUTROPHILS # BLD: 71.8 % (ref 42.8–82.8)
NITRITE UR QL STRIP: NEGATIVE
PCO2 BLDA: 59.3 MMHG (ref 35–45)
PCO2 BLDV: 73.2 MMHG (ref 38–52)
PH BLDV: 7.18 [PH] (ref 7.31–7.41)
PH UR: 5 [PH] (ref 5–8)
PLATELET # BLD AUTO: 209 10^3/UL (ref 134–434)
PMV BLD: 9.3 FL (ref 7.5–11.1)
PO2 BLDA: 123.3 MMHG (ref 80–100)
POTASSIUM SERPLBLD-SCNC: 5.2 MMOL/L (ref 3.5–5.1)
PROT SERPL-MCNC: 6.8 G/DL (ref 6.4–8.2)
PROT UR QL STRIP: (no result)
PROT UR QL STRIP: (no result)
RBC # BLD AUTO: 4.95 M/MM3 (ref 3.6–5.2)
SAO2 % BLDA: 97.8 % (ref 95–98)
SAO2 % BLDV: 48.1 % (ref 70–80)
SODIUM SERPL-SCNC: 140 MMOL/L (ref 136–145)
SP GR UR: 1.01 (ref 1.01–1.03)
UROBILINOGEN UR STRIP-MCNC: 0.2 MG/DL (ref 0.2–1)
VENTILATION MODE VENT: (no result)
WBC # BLD AUTO: 6.6 K/MM3 (ref 4–10)

## 2023-05-12 PROCEDURE — 4A133J1 MONITORING OF ARTERIAL PULSE, PERIPHERAL, PERCUTANEOUS APPROACH: ICD-10-PCS | Performed by: INTERNAL MEDICINE

## 2023-05-12 PROCEDURE — 4A133B1 MONITORING OF ARTERIAL PRESSURE, PERIPHERAL, PERCUTANEOUS APPROACH: ICD-10-PCS | Performed by: INTERNAL MEDICINE

## 2023-05-12 PROCEDURE — U0005 INFEC AGEN DETEC AMPLI PROBE: HCPCS

## 2023-05-12 PROCEDURE — U0003 INFECTIOUS AGENT DETECTION BY NUCLEIC ACID (DNA OR RNA); SEVERE ACUTE RESPIRATORY SYNDROME CORONAVIRUS 2 (SARS-COV-2) (CORONAVIRUS DISEASE [COVID-19]), AMPLIFIED PROBE TECHNIQUE, MAKING USE OF HIGH THROUGHPUT TECHNOLOGIES AS DESCRIBED BY CMS-2020-01-R: HCPCS

## 2023-05-12 RX ADMIN — ATORVASTATIN CALCIUM SCH: 20 TABLET, FILM COATED ORAL at 21:33

## 2023-05-12 RX ADMIN — MUPIROCIN SCH APPLIC: 20 OINTMENT TOPICAL at 21:33

## 2023-05-12 RX ADMIN — CHLORHEXIDINE GLUCONATE SCH APPLIC: 213 SOLUTION TOPICAL at 21:33

## 2023-05-12 RX ADMIN — ENOXAPARIN SODIUM SCH MG: 100 INJECTION SUBCUTANEOUS at 21:32

## 2023-05-12 RX ADMIN — INSULIN ASPART SCH: 100 INJECTION, SOLUTION INTRAVENOUS; SUBCUTANEOUS at 21:33

## 2023-05-13 LAB
ALBUMIN SERPL-MCNC: 2.7 G/DL (ref 3.4–5)
ALP SERPL-CCNC: 174 U/L (ref 45–117)
ALT SERPL-CCNC: 12 U/L (ref 13–61)
ANION GAP SERPL CALC-SCNC: 4 MMOL/L (ref 8–16)
ARTERIAL PATENCY WRIST A: (no result)
AST SERPL-CCNC: 15 U/L (ref 15–37)
BASE EXCESS BLDA CALC-SCNC: 2.1 MMOL/L (ref -2–2)
BILIRUB SERPL-MCNC: 1.3 MG/DL (ref 0.2–1)
BREATHS.MECHANICAL ON VENT: 18
BUN SERPL-MCNC: 50 MG/DL (ref 7–18)
CALCIUM SERPL-MCNC: 9.4 MG/DL (ref 8.5–10.1)
CHLORIDE SERPL-SCNC: 108 MMOL/L (ref 98–107)
CO2 SERPL-SCNC: 32 MMOL/L (ref 21–32)
CREAT SERPL-MCNC: 1.8 MG/DL (ref 0.55–1.3)
DEPRECATED RDW RBC AUTO: 18.5 % (ref 11.6–15.6)
GLUCOSE SERPL-MCNC: 103 MG/DL (ref 74–106)
HCT VFR BLD CALC: 35.1 % (ref 32.4–45.2)
HCT VFR BLDV CALC: 34 % (ref 32.4–45.2)
HGB BLD-MCNC: 11 GM/DL (ref 10.7–15.3)
INR BLD: 3.25 (ref 0.83–1.09)
MAGNESIUM SERPL-MCNC: 2.1 MG/DL (ref 1.8–2.4)
MCH RBC QN AUTO: 24.4 PG (ref 25.7–33.7)
MCHC RBC AUTO-ENTMCNC: 31.5 G/DL (ref 32–36)
MCV RBC: 77.4 FL (ref 80–96)
PCO2 BLDA: 58.6 MMHG (ref 35–45)
PHOSPHATE SERPL-MCNC: 3.5 MG/DL (ref 2.5–4.9)
PLATELET # BLD AUTO: 153 10^3/UL (ref 134–434)
PMV BLD: 9.1 FL (ref 7.5–11.1)
PO2 BLDA: 134.6 MMHG (ref 80–100)
POTASSIUM SERPLBLD-SCNC: 4.7 MMOL/L (ref 3.5–5.1)
PROT SERPL-MCNC: 5.5 G/DL (ref 6.4–8.2)
PT PNL PPP: 37.3 SEC (ref 9.7–13)
RBC # BLD AUTO: 4.53 M/MM3 (ref 3.6–5.2)
SAO2 % BLDA: 98.4 % (ref 95–98)
SODIUM SERPL-SCNC: 143 MMOL/L (ref 136–145)
VENTILATION MODE VENT: (no result)
WBC # BLD AUTO: 5.8 K/MM3 (ref 4–10)

## 2023-05-13 RX ADMIN — INSULIN ASPART SCH: 100 INJECTION, SOLUTION INTRAVENOUS; SUBCUTANEOUS at 06:09

## 2023-05-13 RX ADMIN — DEXTROSE MONOHYDRATE SCH MLS/HR: 50 INJECTION, SOLUTION INTRAVENOUS at 21:15

## 2023-05-13 RX ADMIN — INSULIN ASPART SCH: 100 INJECTION, SOLUTION INTRAVENOUS; SUBCUTANEOUS at 17:49

## 2023-05-13 RX ADMIN — INSULIN ASPART SCH: 100 INJECTION, SOLUTION INTRAVENOUS; SUBCUTANEOUS at 11:30

## 2023-05-13 RX ADMIN — DEXTROSE MONOHYDRATE SCH MLS/HR: 50 INJECTION, SOLUTION INTRAVENOUS at 11:29

## 2023-05-13 RX ADMIN — CHLORHEXIDINE GLUCONATE SCH APPLIC: 213 SOLUTION TOPICAL at 21:16

## 2023-05-13 RX ADMIN — PANTOPRAZOLE SODIUM SCH MG: 40 INJECTION, POWDER, FOR SOLUTION INTRAVENOUS at 11:32

## 2023-05-13 RX ADMIN — ENOXAPARIN SODIUM SCH MG: 100 INJECTION SUBCUTANEOUS at 06:08

## 2023-05-13 RX ADMIN — MUPIROCIN SCH APPLIC: 20 OINTMENT TOPICAL at 21:16

## 2023-05-13 RX ADMIN — MUPIROCIN SCH APPLIC: 20 OINTMENT TOPICAL at 09:40

## 2023-05-13 RX ADMIN — INSULIN ASPART SCH: 100 INJECTION, SOLUTION INTRAVENOUS; SUBCUTANEOUS at 21:16

## 2023-05-13 RX ADMIN — ENOXAPARIN SODIUM SCH MG: 100 INJECTION SUBCUTANEOUS at 19:24

## 2023-05-14 LAB
ALBUMIN SERPL-MCNC: 2.7 G/DL (ref 3.4–5)
ALP SERPL-CCNC: 164 U/L (ref 45–117)
ALT SERPL-CCNC: 13 U/L (ref 13–61)
ANION GAP SERPL CALC-SCNC: 6 MMOL/L (ref 8–16)
AST SERPL-CCNC: 16 U/L (ref 15–37)
BILIRUB SERPL-MCNC: 2.1 MG/DL (ref 0.2–1)
BUN SERPL-MCNC: 45 MG/DL (ref 7–18)
CALCIUM SERPL-MCNC: 9 MG/DL (ref 8.5–10.1)
CHLORIDE SERPL-SCNC: 104 MMOL/L (ref 98–107)
CO2 SERPL-SCNC: 34 MMOL/L (ref 21–32)
CREAT SERPL-MCNC: 1.6 MG/DL (ref 0.55–1.3)
DEPRECATED RDW RBC AUTO: 18.4 % (ref 11.6–15.6)
GLUCOSE SERPL-MCNC: 74 MG/DL (ref 74–106)
HCT VFR BLD CALC: 35.2 % (ref 32.4–45.2)
HGB BLD-MCNC: 11.2 GM/DL (ref 10.7–15.3)
MAGNESIUM SERPL-MCNC: 1.8 MG/DL (ref 1.8–2.4)
MCH RBC QN AUTO: 24.3 PG (ref 25.7–33.7)
MCHC RBC AUTO-ENTMCNC: 31.7 G/DL (ref 32–36)
MCV RBC: 76.5 FL (ref 80–96)
PHOSPHATE SERPL-MCNC: 2.8 MG/DL (ref 2.5–4.9)
PLATELET # BLD AUTO: 180 10^3/UL (ref 134–434)
PMV BLD: 9 FL (ref 7.5–11.1)
POTASSIUM SERPLBLD-SCNC: 4.2 MMOL/L (ref 3.5–5.1)
PROT SERPL-MCNC: 5.5 G/DL (ref 6.4–8.2)
RBC # BLD AUTO: 4.61 M/MM3 (ref 3.6–5.2)
SODIUM SERPL-SCNC: 144 MMOL/L (ref 136–145)
WBC # BLD AUTO: 6.8 K/MM3 (ref 4–10)

## 2023-05-14 RX ADMIN — ENOXAPARIN SODIUM SCH MG: 100 INJECTION SUBCUTANEOUS at 06:19

## 2023-05-14 RX ADMIN — ATORVASTATIN CALCIUM SCH MG: 20 TABLET, FILM COATED ORAL at 21:18

## 2023-05-14 RX ADMIN — INSULIN ASPART SCH UNIT: 100 INJECTION, SOLUTION INTRAVENOUS; SUBCUTANEOUS at 21:18

## 2023-05-14 RX ADMIN — CHLORHEXIDINE GLUCONATE SCH APPLIC: 213 SOLUTION TOPICAL at 21:18

## 2023-05-14 RX ADMIN — ENOXAPARIN SODIUM SCH MG: 100 INJECTION SUBCUTANEOUS at 18:24

## 2023-05-14 RX ADMIN — INSULIN ASPART SCH: 100 INJECTION, SOLUTION INTRAVENOUS; SUBCUTANEOUS at 10:15

## 2023-05-14 RX ADMIN — MUPIROCIN SCH APPLIC: 20 OINTMENT TOPICAL at 21:18

## 2023-05-14 RX ADMIN — DEXTROSE MONOHYDRATE SCH MLS/HR: 50 INJECTION, SOLUTION INTRAVENOUS at 09:30

## 2023-05-14 RX ADMIN — PANTOPRAZOLE SODIUM SCH MG: 40 INJECTION, POWDER, FOR SOLUTION INTRAVENOUS at 09:55

## 2023-05-14 RX ADMIN — INSULIN ASPART SCH UNIT: 100 INJECTION, SOLUTION INTRAVENOUS; SUBCUTANEOUS at 16:31

## 2023-05-14 RX ADMIN — INSULIN ASPART SCH: 100 INJECTION, SOLUTION INTRAVENOUS; SUBCUTANEOUS at 06:19

## 2023-05-14 RX ADMIN — MUPIROCIN SCH APPLIC: 20 OINTMENT TOPICAL at 09:56

## 2023-05-15 LAB
ALBUMIN SERPL-MCNC: 2.9 G/DL (ref 3.4–5)
ALP SERPL-CCNC: 166 U/L (ref 45–117)
ALT SERPL-CCNC: 11 U/L (ref 13–61)
ANION GAP SERPL CALC-SCNC: 5 MMOL/L (ref 8–16)
ARTERIAL PATENCY WRIST A: (no result)
AST SERPL-CCNC: 20 U/L (ref 15–37)
BASE EXCESS BLDA CALC-SCNC: 8.9 MMOL/L (ref -2–2)
BILIRUB CONJ SERPL-MCNC: 0.8 MG/DL (ref 0–0.2)
BILIRUB SERPL-MCNC: 2.3 MG/DL (ref 0.2–1)
BUN SERPL-MCNC: 44 MG/DL (ref 7–18)
CALCIUM SERPL-MCNC: 9.2 MG/DL (ref 8.5–10.1)
CHLORIDE SERPL-SCNC: 101 MMOL/L (ref 98–107)
CO2 SERPL-SCNC: 39 MMOL/L (ref 21–32)
CREAT SERPL-MCNC: 1.5 MG/DL (ref 0.55–1.3)
DEPRECATED RDW RBC AUTO: 18.1 % (ref 11.6–15.6)
GLUCOSE SERPL-MCNC: 109 MG/DL (ref 74–106)
HCT VFR BLD CALC: 34.8 % (ref 32.4–45.2)
HCT VFR BLDV CALC: 34 % (ref 32.4–45.2)
HGB BLD-MCNC: 11.1 GM/DL (ref 10.7–15.3)
MAGNESIUM SERPL-MCNC: 1.5 MG/DL (ref 1.8–2.4)
MCH RBC QN AUTO: 24.3 PG (ref 25.7–33.7)
MCHC RBC AUTO-ENTMCNC: 31.9 G/DL (ref 32–36)
MCV RBC: 76.2 FL (ref 80–96)
PCO2 BLDA: 52.5 MMHG (ref 35–45)
PHOSPHATE SERPL-MCNC: 2.4 MG/DL (ref 2.5–4.9)
PLATELET # BLD AUTO: 182 10^3/UL (ref 134–434)
PMV BLD: 9.1 FL (ref 7.5–11.1)
PO2 BLDA: 99.7 MMHG (ref 80–100)
POTASSIUM SERPLBLD-SCNC: 3.9 MMOL/L (ref 3.5–5.1)
PROT SERPL-MCNC: 6 G/DL (ref 6.4–8.2)
RBC # BLD AUTO: 4.57 M/MM3 (ref 3.6–5.2)
SAO2 % BLDA: 97.6 % (ref 95–98)
SODIUM SERPL-SCNC: 145 MMOL/L (ref 136–145)
WBC # BLD AUTO: 7 K/MM3 (ref 4–10)

## 2023-05-15 RX ADMIN — PANTOPRAZOLE SODIUM SCH MG: 40 INJECTION, POWDER, FOR SOLUTION INTRAVENOUS at 09:00

## 2023-05-15 RX ADMIN — INSULIN ASPART SCH UNIT: 100 INJECTION, SOLUTION INTRAVENOUS; SUBCUTANEOUS at 11:45

## 2023-05-15 RX ADMIN — MUPIROCIN SCH APPLIC: 20 OINTMENT TOPICAL at 10:25

## 2023-05-15 RX ADMIN — FUROSEMIDE SCH MG: 10 INJECTION, SOLUTION INTRAVENOUS at 16:37

## 2023-05-15 RX ADMIN — DEXTROSE MONOHYDRATE SCH MLS/HR: 50 INJECTION, SOLUTION INTRAVENOUS at 04:18

## 2023-05-15 RX ADMIN — METHIMAZOLE SCH MG: 5 TABLET ORAL at 10:24

## 2023-05-15 RX ADMIN — INSULIN ASPART SCH: 100 INJECTION, SOLUTION INTRAVENOUS; SUBCUTANEOUS at 21:16

## 2023-05-15 RX ADMIN — SERTRALINE HYDROCHLORIDE SCH MG: 25 TABLET ORAL at 09:00

## 2023-05-15 RX ADMIN — INSULIN ASPART SCH UNIT: 100 INJECTION, SOLUTION INTRAVENOUS; SUBCUTANEOUS at 17:54

## 2023-05-15 RX ADMIN — INSULIN ASPART SCH: 100 INJECTION, SOLUTION INTRAVENOUS; SUBCUTANEOUS at 06:23

## 2023-05-15 RX ADMIN — ATORVASTATIN CALCIUM SCH MG: 20 TABLET, FILM COATED ORAL at 21:10

## 2023-05-15 RX ADMIN — RIVAROXABAN SCH MG: 15 TABLET, FILM COATED ORAL at 17:54

## 2023-05-15 RX ADMIN — ENOXAPARIN SODIUM SCH MG: 100 INJECTION SUBCUTANEOUS at 06:23

## 2023-05-16 LAB
ALBUMIN SERPL-MCNC: 3.2 G/DL (ref 3.4–5)
ALP SERPL-CCNC: 171 U/L (ref 45–117)
ALT SERPL-CCNC: 12 U/L (ref 13–61)
ANION GAP SERPL CALC-SCNC: 8 MMOL/L (ref 8–16)
AST SERPL-CCNC: 19 U/L (ref 15–37)
BASOPHILS # BLD: 0.4 % (ref 0–2)
BILIRUB CONJ SERPL-MCNC: 0.9 MG/DL (ref 0–0.2)
BILIRUB SERPL-MCNC: 3.3 MG/DL (ref 0.2–1)
BUN SERPL-MCNC: 39 MG/DL (ref 7–18)
CALCIUM SERPL-MCNC: 9.5 MG/DL (ref 8.5–10.1)
CHLORIDE SERPL-SCNC: 96 MMOL/L (ref 98–107)
CO2 SERPL-SCNC: 37 MMOL/L (ref 21–32)
CREAT SERPL-MCNC: 1.6 MG/DL (ref 0.55–1.3)
DEPRECATED RDW RBC AUTO: 18.6 % (ref 11.6–15.6)
EOSINOPHIL # BLD: 0.3 % (ref 0–4.5)
GGT SERPL-CCNC: 116 U/L (ref 5–85)
GLUCOSE SERPL-MCNC: 150 MG/DL (ref 74–106)
HCT VFR BLD CALC: 36.9 % (ref 32.4–45.2)
HGB BLD-MCNC: 11.5 GM/DL (ref 10.7–15.3)
LYMPHOCYTES # BLD: 11.3 % (ref 8–40)
MACROPHAGES NFR PLR MANUAL: 10 %
MAGNESIUM SERPL-MCNC: 1.7 MG/DL (ref 1.8–2.4)
MCH RBC QN AUTO: 23.9 PG (ref 25.7–33.7)
MCHC RBC AUTO-ENTMCNC: 31.3 G/DL (ref 32–36)
MCV RBC: 76.5 FL (ref 80–96)
MONOCYTES # BLD AUTO: 12.6 % (ref 3.8–10.2)
MONOCYTES NFR PLR MANUAL: 6 %
NEUTROPHILS # BLD: 75.4 % (ref 42.8–82.8)
NUC CELL # FLD: 18.53 /MM3
PHOSPHATE SERPL-MCNC: 3.1 MG/DL (ref 2.5–4.9)
PLATELET # BLD AUTO: 167 10^3/UL (ref 134–434)
PMV BLD: 8.3 FL (ref 7.5–11.1)
POTASSIUM SERPLBLD-SCNC: 4.1 MMOL/L (ref 3.5–5.1)
PROT SERPL-MCNC: 6.7 G/DL (ref 6.4–8.2)
RBC # BLD AUTO: 4.82 M/MM3 (ref 3.6–5.2)
SODIUM SERPL-SCNC: 141 MMOL/L (ref 136–145)
URATE SERPL-SCNC: 14.7 MG/DL (ref 2.6–7.2)
WBC # BLD AUTO: 8.8 K/MM3 (ref 4–10)

## 2023-05-16 RX ADMIN — METOPROLOL TARTRATE SCH MG: 50 TABLET, FILM COATED ORAL at 16:04

## 2023-05-16 RX ADMIN — ATORVASTATIN CALCIUM SCH MG: 20 TABLET, FILM COATED ORAL at 22:54

## 2023-05-16 RX ADMIN — INSULIN ASPART SCH: 100 INJECTION, SOLUTION INTRAVENOUS; SUBCUTANEOUS at 06:19

## 2023-05-16 RX ADMIN — INSULIN ASPART SCH UNIT: 100 INJECTION, SOLUTION INTRAVENOUS; SUBCUTANEOUS at 16:19

## 2023-05-16 RX ADMIN — ALLOPURINOL SCH MG: 100 TABLET ORAL at 10:38

## 2023-05-16 RX ADMIN — SERTRALINE HYDROCHLORIDE SCH MG: 25 TABLET ORAL at 10:38

## 2023-05-16 RX ADMIN — RIVAROXABAN SCH MG: 15 TABLET, FILM COATED ORAL at 18:28

## 2023-05-16 RX ADMIN — METOPROLOL TARTRATE SCH MG: 50 TABLET, FILM COATED ORAL at 22:54

## 2023-05-16 RX ADMIN — METOPROLOL TARTRATE SCH: 50 TABLET, FILM COATED ORAL at 22:54

## 2023-05-16 RX ADMIN — FUROSEMIDE SCH MG: 10 INJECTION, SOLUTION INTRAVENOUS at 05:29

## 2023-05-16 RX ADMIN — INSULIN ASPART SCH UNIT: 100 INJECTION, SOLUTION INTRAVENOUS; SUBCUTANEOUS at 10:37

## 2023-05-16 RX ADMIN — INSULIN ASPART SCH UNIT: 100 INJECTION, SOLUTION INTRAVENOUS; SUBCUTANEOUS at 23:01

## 2023-05-16 RX ADMIN — FUROSEMIDE SCH MG: 10 INJECTION, SOLUTION INTRAVENOUS at 16:04

## 2023-05-16 RX ADMIN — METHIMAZOLE SCH MG: 5 TABLET ORAL at 10:38

## 2023-05-17 LAB
ALBUMIN SERPL-MCNC: 2.8 G/DL (ref 3.4–5)
ALP SERPL-CCNC: 134 U/L (ref 45–117)
ALT SERPL-CCNC: 11 U/L (ref 13–61)
AST SERPL-CCNC: 19 U/L (ref 15–37)
BILIRUB CONJ SERPL-MCNC: 1 MG/DL (ref 0–0.2)
BILIRUB SERPL-MCNC: 2.8 MG/DL (ref 0.2–1)
IRON SERPL-MCNC: 23 UG/DL (ref 50–175)
PROT SERPL-MCNC: 6.1 G/DL (ref 6.4–8.2)
TIBC SERPL-MCNC: 285 UG/DL (ref 250–450)

## 2023-05-17 RX ADMIN — INSULIN ASPART SCH: 100 INJECTION, SOLUTION INTRAVENOUS; SUBCUTANEOUS at 06:57

## 2023-05-17 RX ADMIN — METHIMAZOLE SCH MG: 5 TABLET ORAL at 09:39

## 2023-05-17 RX ADMIN — ALLOPURINOL SCH MG: 100 TABLET ORAL at 09:39

## 2023-05-17 RX ADMIN — METOPROLOL TARTRATE SCH: 50 TABLET, FILM COATED ORAL at 06:52

## 2023-05-17 RX ADMIN — INSULIN ASPART SCH UNITS: 100 INJECTION, SOLUTION INTRAVENOUS; SUBCUTANEOUS at 22:26

## 2023-05-17 RX ADMIN — INSULIN ASPART SCH UNITS: 100 INJECTION, SOLUTION INTRAVENOUS; SUBCUTANEOUS at 16:25

## 2023-05-17 RX ADMIN — INSULIN ASPART SCH UNITS: 100 INJECTION, SOLUTION INTRAVENOUS; SUBCUTANEOUS at 11:59

## 2023-05-17 RX ADMIN — FUROSEMIDE SCH MG: 10 INJECTION, SOLUTION INTRAVENOUS at 06:57

## 2023-05-17 RX ADMIN — SERTRALINE HYDROCHLORIDE SCH MG: 25 TABLET ORAL at 09:39

## 2023-05-17 RX ADMIN — Medication SCH EACH: at 22:26

## 2023-05-17 RX ADMIN — RIVAROXABAN SCH MG: 20 TABLET, FILM COATED ORAL at 17:38

## 2023-05-17 RX ADMIN — ATORVASTATIN CALCIUM SCH MG: 20 TABLET, FILM COATED ORAL at 22:26

## 2023-05-17 RX ADMIN — LIDOCAINE SCH PATCH: 50 PATCH TOPICAL at 12:02

## 2023-05-18 LAB
ALBUMIN SERPL-MCNC: 2.8 G/DL (ref 3.4–5)
ALP SERPL-CCNC: 130 U/L (ref 45–117)
ALT SERPL-CCNC: 13 U/L (ref 13–61)
ANION GAP SERPL CALC-SCNC: 9 MMOL/L (ref 8–16)
AST SERPL-CCNC: 19 U/L (ref 15–37)
BASOPHILS # BLD: 0.6 % (ref 0–2)
BILIRUB SERPL-MCNC: 2.6 MG/DL (ref 0.2–1)
BUN SERPL-MCNC: 49.6 MG/DL (ref 7–18)
CALCIUM SERPL-MCNC: 9 MG/DL (ref 8.5–10.1)
CHLORIDE SERPL-SCNC: 91 MMOL/L (ref 98–107)
CO2 SERPL-SCNC: 36 MMOL/L (ref 21–32)
CREAT SERPL-MCNC: 1.7 MG/DL (ref 0.55–1.3)
DEPRECATED RDW RBC AUTO: 18.4 % (ref 11.6–15.6)
EOSINOPHIL # BLD: 2.9 % (ref 0–4.5)
GLUCOSE SERPL-MCNC: 141 MG/DL (ref 74–106)
HCT VFR BLD CALC: 31.8 % (ref 32.4–45.2)
HGB BLD-MCNC: 10.4 GM/DL (ref 10.7–15.3)
LYMPHOCYTES # BLD: 26 % (ref 8–40)
MAGNESIUM SERPL-MCNC: 1.8 MG/DL (ref 1.8–2.4)
MCH RBC QN AUTO: 24.1 PG (ref 25.7–33.7)
MCHC RBC AUTO-ENTMCNC: 32.6 G/DL (ref 32–36)
MCV RBC: 74.1 FL (ref 80–96)
MONOCYTES # BLD AUTO: 10.9 % (ref 3.8–10.2)
NEUTROPHILS # BLD: 59.6 % (ref 42.8–82.8)
PHOSPHATE SERPL-MCNC: 2.6 MG/DL (ref 2.5–4.9)
PLATELET # BLD AUTO: 147 10^3/UL (ref 134–434)
PMV BLD: 9.1 FL (ref 7.5–11.1)
POTASSIUM SERPLBLD-SCNC: 4.1 MMOL/L (ref 3.5–5.1)
PROT SERPL-MCNC: 6.1 G/DL (ref 6.4–8.2)
RBC # BLD AUTO: 4.29 M/MM3 (ref 3.6–5.2)
SODIUM SERPL-SCNC: 136 MMOL/L (ref 136–145)
WBC # BLD AUTO: 7.3 K/MM3 (ref 4–10)

## 2023-05-18 RX ADMIN — ATORVASTATIN CALCIUM SCH MG: 20 TABLET, FILM COATED ORAL at 23:19

## 2023-05-18 RX ADMIN — INSULIN ASPART SCH UNITS: 100 INJECTION, SOLUTION INTRAVENOUS; SUBCUTANEOUS at 16:52

## 2023-05-18 RX ADMIN — INSULIN ASPART SCH UNITS: 100 INJECTION, SOLUTION INTRAVENOUS; SUBCUTANEOUS at 23:22

## 2023-05-18 RX ADMIN — FUROSEMIDE SCH MG: 10 INJECTION, SOLUTION INTRAVENOUS at 10:29

## 2023-05-18 RX ADMIN — SERTRALINE HYDROCHLORIDE SCH MG: 25 TABLET ORAL at 10:28

## 2023-05-18 RX ADMIN — INSULIN ASPART SCH UNITS: 100 INJECTION, SOLUTION INTRAVENOUS; SUBCUTANEOUS at 06:59

## 2023-05-18 RX ADMIN — LIDOCAINE SCH PATCH: 50 PATCH TOPICAL at 10:29

## 2023-05-18 RX ADMIN — RIVAROXABAN SCH MG: 20 TABLET, FILM COATED ORAL at 17:17

## 2023-05-18 RX ADMIN — Medication SCH EACH: at 23:20

## 2023-05-18 RX ADMIN — INSULIN ASPART SCH UNITS: 100 INJECTION, SOLUTION INTRAVENOUS; SUBCUTANEOUS at 12:26

## 2023-05-18 RX ADMIN — METHIMAZOLE SCH MG: 5 TABLET ORAL at 10:27

## 2023-05-19 LAB
ALBUMIN SERPL-MCNC: 2.7 G/DL (ref 3.4–5)
ALP SERPL-CCNC: 124 U/L (ref 45–117)
ALT SERPL-CCNC: 12 U/L (ref 13–61)
ANION GAP SERPL CALC-SCNC: 6 MMOL/L (ref 8–16)
AST SERPL-CCNC: 21 U/L (ref 15–37)
BASOPHILS # BLD: 0.7 % (ref 0–2)
BILIRUB SERPL-MCNC: 1.9 MG/DL (ref 0.2–1)
BUN SERPL-MCNC: 50.9 MG/DL (ref 7–18)
CALCIUM SERPL-MCNC: 9.3 MG/DL (ref 8.5–10.1)
CHLORIDE SERPL-SCNC: 92 MMOL/L (ref 98–107)
CO2 SERPL-SCNC: 36 MMOL/L (ref 21–32)
CREAT SERPL-MCNC: 1.5 MG/DL (ref 0.55–1.3)
DEPRECATED RDW RBC AUTO: 18.6 % (ref 11.6–15.6)
EOSINOPHIL # BLD: 5.4 % (ref 0–4.5)
GLIADIN IGA SER-ACNC: 4 UNITS (ref 0–19)
GLIADIN IGG SER IA-ACNC: 2 UNITS (ref 0–19)
GLUCOSE SERPL-MCNC: 166 MG/DL (ref 74–106)
HCT VFR BLD CALC: 31.7 % (ref 32.4–45.2)
HGB BLD-MCNC: 10.2 GM/DL (ref 10.7–15.3)
INR BLD: 3.9 (ref 0.83–1.09)
LYMPHOCYTES # BLD: 22.9 % (ref 8–40)
MCH RBC QN AUTO: 23.8 PG (ref 25.7–33.7)
MCHC RBC AUTO-ENTMCNC: 32.1 G/DL (ref 32–36)
MCV RBC: 74.3 FL (ref 80–96)
MONOCYTES # BLD AUTO: 8.3 % (ref 3.8–10.2)
NEUTROPHILS # BLD: 62.7 % (ref 42.8–82.8)
PLATELET # BLD AUTO: 163 10^3/UL (ref 134–434)
PMV BLD: 9.7 FL (ref 7.5–11.1)
POTASSIUM SERPLBLD-SCNC: 3.7 MMOL/L (ref 3.5–5.1)
PROT SERPL-MCNC: 5.9 G/DL (ref 6.4–8.2)
PT PNL PPP: 44.6 SEC (ref 9.7–13)
RBC # BLD AUTO: 4.26 M/MM3 (ref 3.6–5.2)
SODIUM SERPL-SCNC: 134 MMOL/L (ref 136–145)
TTG IGG SER QL: < 2 U/ML (ref 0–5)
WBC # BLD AUTO: 6.6 K/MM3 (ref 4–10)

## 2023-05-19 RX ADMIN — INSULIN ASPART SCH UNITS: 100 INJECTION, SOLUTION INTRAVENOUS; SUBCUTANEOUS at 07:00

## 2023-05-19 RX ADMIN — FUROSEMIDE SCH MG: 10 INJECTION, SOLUTION INTRAVENOUS at 09:51

## 2023-05-19 RX ADMIN — ATORVASTATIN CALCIUM SCH MG: 20 TABLET, FILM COATED ORAL at 21:57

## 2023-05-19 RX ADMIN — ALLOPURINOL SCH MG: 100 TABLET ORAL at 09:50

## 2023-05-19 RX ADMIN — RIVAROXABAN SCH MG: 20 TABLET, FILM COATED ORAL at 17:14

## 2023-05-19 RX ADMIN — LIDOCAINE SCH PATCH: 50 PATCH TOPICAL at 09:54

## 2023-05-19 RX ADMIN — Medication SCH: at 22:04

## 2023-05-19 RX ADMIN — SERTRALINE HYDROCHLORIDE SCH MG: 25 TABLET ORAL at 09:50

## 2023-05-19 RX ADMIN — METHIMAZOLE SCH MG: 5 TABLET ORAL at 09:54

## 2023-05-19 RX ADMIN — INSULIN ASPART SCH UNITS: 100 INJECTION, SOLUTION INTRAVENOUS; SUBCUTANEOUS at 22:03

## 2023-05-19 RX ADMIN — INSULIN ASPART SCH UNITS: 100 INJECTION, SOLUTION INTRAVENOUS; SUBCUTANEOUS at 17:14

## 2023-05-19 RX ADMIN — INSULIN ASPART SCH UNITS: 100 INJECTION, SOLUTION INTRAVENOUS; SUBCUTANEOUS at 11:34

## 2023-05-20 LAB
ALBUMIN SERPL-MCNC: 2.7 G/DL (ref 3.4–5)
ALP SERPL-CCNC: 123 U/L (ref 45–117)
ALT SERPL-CCNC: 14 U/L (ref 13–61)
ANION GAP SERPL CALC-SCNC: 5 MMOL/L (ref 8–16)
AST SERPL-CCNC: 19 U/L (ref 15–37)
BASOPHILS # BLD: 0.7 % (ref 0–2)
BILIRUB SERPL-MCNC: 1.8 MG/DL (ref 0.2–1)
BUN SERPL-MCNC: 45 MG/DL (ref 7–18)
CALCIUM SERPL-MCNC: 9.3 MG/DL (ref 8.5–10.1)
CHLORIDE SERPL-SCNC: 93 MMOL/L (ref 98–107)
CO2 SERPL-SCNC: 37 MMOL/L (ref 21–32)
CREAT SERPL-MCNC: 1.3 MG/DL (ref 0.55–1.3)
DEPRECATED RDW RBC AUTO: 18.6 % (ref 11.6–15.6)
EOSINOPHIL # BLD: 5.5 % (ref 0–4.5)
GLUCOSE SERPL-MCNC: 145 MG/DL (ref 74–106)
HCT VFR BLD CALC: 31.3 % (ref 32.4–45.2)
HGB BLD-MCNC: 9.9 GM/DL (ref 10.7–15.3)
INR BLD: 2.23 (ref 0.83–1.09)
LYMPHOCYTES # BLD: 25.8 % (ref 8–40)
MAGNESIUM SERPL-MCNC: 2 MG/DL (ref 1.8–2.4)
MCH RBC QN AUTO: 23.7 PG (ref 25.7–33.7)
MCHC RBC AUTO-ENTMCNC: 31.5 G/DL (ref 32–36)
MCV RBC: 75.3 FL (ref 80–96)
MONOCYTES # BLD AUTO: 8.4 % (ref 3.8–10.2)
NEUTROPHILS # BLD: 59.6 % (ref 42.8–82.8)
PLATELET # BLD AUTO: 164 10^3/UL (ref 134–434)
PMV BLD: 8.7 FL (ref 7.5–11.1)
POTASSIUM SERPLBLD-SCNC: 3.9 MMOL/L (ref 3.5–5.1)
PROT SERPL-MCNC: 5.8 G/DL (ref 6.4–8.2)
PT PNL PPP: 25.7 SEC (ref 9.7–13)
RBC # BLD AUTO: 4.16 M/MM3 (ref 3.6–5.2)
SODIUM SERPL-SCNC: 135 MMOL/L (ref 136–145)
WBC # BLD AUTO: 5.9 K/MM3 (ref 4–10)

## 2023-05-20 RX ADMIN — INSULIN ASPART SCH UNITS: 100 INJECTION, SOLUTION INTRAVENOUS; SUBCUTANEOUS at 23:04

## 2023-05-20 RX ADMIN — METHIMAZOLE SCH MG: 5 TABLET ORAL at 09:58

## 2023-05-20 RX ADMIN — Medication SCH EACH: at 21:38

## 2023-05-20 RX ADMIN — ALLOPURINOL SCH MG: 100 TABLET ORAL at 09:59

## 2023-05-20 RX ADMIN — ATORVASTATIN CALCIUM SCH MG: 20 TABLET, FILM COATED ORAL at 23:04

## 2023-05-20 RX ADMIN — LIDOCAINE SCH PATCH: 50 PATCH TOPICAL at 09:59

## 2023-05-20 RX ADMIN — INSULIN ASPART SCH UNITS: 100 INJECTION, SOLUTION INTRAVENOUS; SUBCUTANEOUS at 18:06

## 2023-05-20 RX ADMIN — INSULIN ASPART SCH UNITS: 100 INJECTION, SOLUTION INTRAVENOUS; SUBCUTANEOUS at 11:49

## 2023-05-20 RX ADMIN — FUROSEMIDE SCH MG: 10 INJECTION, SOLUTION INTRAVENOUS at 09:59

## 2023-05-20 RX ADMIN — INSULIN ASPART SCH UNITS: 100 INJECTION, SOLUTION INTRAVENOUS; SUBCUTANEOUS at 06:11

## 2023-05-20 RX ADMIN — SERTRALINE HYDROCHLORIDE SCH MG: 25 TABLET ORAL at 09:59

## 2023-05-21 LAB
ALBUMIN SERPL-MCNC: 2.8 G/DL (ref 3.4–5)
ALP SERPL-CCNC: 130 U/L (ref 45–117)
ALT SERPL-CCNC: 15 U/L (ref 13–61)
ANION GAP SERPL CALC-SCNC: 4 MMOL/L (ref 8–16)
AST SERPL-CCNC: 19 U/L (ref 15–37)
BASOPHILS # BLD: 0.8 % (ref 0–2)
BILIRUB SERPL-MCNC: 2.3 MG/DL (ref 0.2–1)
BUN SERPL-MCNC: 42.8 MG/DL (ref 7–18)
CALCIUM SERPL-MCNC: 9.3 MG/DL (ref 8.5–10.1)
CHLORIDE SERPL-SCNC: 93 MMOL/L (ref 98–107)
CO2 SERPL-SCNC: 36 MMOL/L (ref 21–32)
CREAT SERPL-MCNC: 1.2 MG/DL (ref 0.55–1.3)
DEPRECATED RDW RBC AUTO: 18.9 % (ref 11.6–15.6)
EOSINOPHIL # BLD: 3.4 % (ref 0–4.5)
GLUCOSE SERPL-MCNC: 121 MG/DL (ref 74–106)
HCT VFR BLD CALC: 31.5 % (ref 32.4–45.2)
HGB BLD-MCNC: 10 GM/DL (ref 10.7–15.3)
INR BLD: 3 (ref 0.83–1.09)
LYMPHOCYTES # BLD: 22.6 % (ref 8–40)
MAGNESIUM SERPL-MCNC: 2 MG/DL (ref 1.8–2.4)
MCH RBC QN AUTO: 23.8 PG (ref 25.7–33.7)
MCHC RBC AUTO-ENTMCNC: 31.8 G/DL (ref 32–36)
MCV RBC: 74.7 FL (ref 80–96)
MONOCYTES # BLD AUTO: 9.7 % (ref 3.8–10.2)
NEUTROPHILS # BLD: 63.5 % (ref 42.8–82.8)
PHOSPHATE SERPL-MCNC: 2.8 MG/DL (ref 2.5–4.9)
PLATELET # BLD AUTO: 162 10^3/UL (ref 134–434)
PMV BLD: 9 FL (ref 7.5–11.1)
POTASSIUM SERPLBLD-SCNC: 3.8 MMOL/L (ref 3.5–5.1)
PROT SERPL-MCNC: 6.1 G/DL (ref 6.4–8.2)
PT PNL PPP: 34.4 SEC (ref 9.7–13)
RBC # BLD AUTO: 4.22 M/MM3 (ref 3.6–5.2)
SODIUM SERPL-SCNC: 133 MMOL/L (ref 136–145)
WBC # BLD AUTO: 6 K/MM3 (ref 4–10)

## 2023-05-21 RX ADMIN — ALLOPURINOL SCH: 100 TABLET ORAL at 09:18

## 2023-05-21 RX ADMIN — FUROSEMIDE SCH MG: 10 INJECTION, SOLUTION INTRAVENOUS at 08:38

## 2023-05-21 RX ADMIN — ALLOPURINOL SCH MG: 100 TABLET ORAL at 08:38

## 2023-05-21 RX ADMIN — INSULIN ASPART SCH UNITS: 100 INJECTION, SOLUTION INTRAVENOUS; SUBCUTANEOUS at 16:44

## 2023-05-21 RX ADMIN — FUROSEMIDE SCH: 10 INJECTION, SOLUTION INTRAVENOUS at 09:18

## 2023-05-21 RX ADMIN — INSULIN ASPART SCH UNITS: 100 INJECTION, SOLUTION INTRAVENOUS; SUBCUTANEOUS at 22:19

## 2023-05-21 RX ADMIN — INSULIN ASPART SCH: 100 INJECTION, SOLUTION INTRAVENOUS; SUBCUTANEOUS at 06:41

## 2023-05-21 RX ADMIN — LIDOCAINE SCH: 50 PATCH TOPICAL at 09:18

## 2023-05-21 RX ADMIN — METHIMAZOLE SCH: 5 TABLET ORAL at 09:18

## 2023-05-21 RX ADMIN — LIDOCAINE SCH PATCH: 50 PATCH TOPICAL at 08:38

## 2023-05-21 RX ADMIN — METHIMAZOLE SCH MG: 5 TABLET ORAL at 08:38

## 2023-05-21 RX ADMIN — SERTRALINE HYDROCHLORIDE SCH MG: 25 TABLET ORAL at 08:38

## 2023-05-21 RX ADMIN — ATORVASTATIN CALCIUM SCH MG: 20 TABLET, FILM COATED ORAL at 22:17

## 2023-05-21 RX ADMIN — Medication SCH EACH: at 22:17

## 2023-05-21 RX ADMIN — SERTRALINE HYDROCHLORIDE SCH: 25 TABLET ORAL at 09:18

## 2023-05-21 RX ADMIN — INSULIN ASPART SCH UNITS: 100 INJECTION, SOLUTION INTRAVENOUS; SUBCUTANEOUS at 11:39

## 2023-05-22 LAB
ALBUMIN SERPL-MCNC: 2.8 G/DL (ref 3.4–5)
ALP SERPL-CCNC: 134 U/L (ref 45–117)
ALT SERPL-CCNC: 13 U/L (ref 13–61)
ANION GAP SERPL CALC-SCNC: 10 MMOL/L (ref 8–16)
AST SERPL-CCNC: 19 U/L (ref 15–37)
BASOPHILS # BLD: 0.8 % (ref 0–2)
BILIRUB SERPL-MCNC: 2.3 MG/DL (ref 0.2–1)
BUN SERPL-MCNC: 41.2 MG/DL (ref 7–18)
CALCIUM SERPL-MCNC: 9.3 MG/DL (ref 8.5–10.1)
CHLORIDE SERPL-SCNC: 93 MMOL/L (ref 98–107)
CO2 SERPL-SCNC: 33 MMOL/L (ref 21–32)
CREAT SERPL-MCNC: 1.2 MG/DL (ref 0.55–1.3)
DEPRECATED RDW RBC AUTO: 19 % (ref 11.6–15.6)
EOSINOPHIL # BLD: 3 % (ref 0–4.5)
GLUCOSE SERPL-MCNC: 108 MG/DL (ref 74–106)
HCT VFR BLD CALC: 31.4 % (ref 32.4–45.2)
HGB BLD-MCNC: 9.9 GM/DL (ref 10.7–15.3)
INR BLD: 1.94 (ref 0.83–1.09)
LYMPHOCYTES # BLD: 26.8 % (ref 8–40)
MACROPHAGES NFR PLR MANUAL: 30 %
MAGNESIUM SERPL-MCNC: 2.1 MG/DL (ref 1.8–2.4)
MCH RBC QN AUTO: 23.7 PG (ref 25.7–33.7)
MCHC RBC AUTO-ENTMCNC: 31.6 G/DL (ref 32–36)
MCV RBC: 75.1 FL (ref 80–96)
MESOTHL CELL NFR PLR MANUAL: 5 %
MONOCYTES # BLD AUTO: 9.4 % (ref 3.8–10.2)
NEUTROPHILS # BLD: 60 % (ref 42.8–82.8)
NUC CELL # FLD: 946 /MM3
PHOSPHATE SERPL-MCNC: 3 MG/DL (ref 2.5–4.9)
PLATELET # BLD AUTO: 170 10^3/UL (ref 134–434)
PMV BLD: 9.2 FL (ref 7.5–11.1)
POTASSIUM SERPLBLD-SCNC: 4.1 MMOL/L (ref 3.5–5.1)
PROT SERPL-MCNC: 6 G/DL (ref 6.4–8.2)
PT PNL PPP: 22.3 SEC (ref 9.7–13)
RBC # BLD AUTO: 4.19 M/MM3 (ref 3.6–5.2)
SODIUM SERPL-SCNC: 135 MMOL/L (ref 136–145)
WBC # BLD AUTO: 5.7 K/MM3 (ref 4–10)

## 2023-05-22 PROCEDURE — 0W9G3ZX DRAINAGE OF PERITONEAL CAVITY, PERCUTANEOUS APPROACH, DIAGNOSTIC: ICD-10-PCS | Performed by: RADIOLOGY

## 2023-05-22 RX ADMIN — Medication SCH EACH: at 21:57

## 2023-05-22 RX ADMIN — INSULIN ASPART SCH: 100 INJECTION, SOLUTION INTRAVENOUS; SUBCUTANEOUS at 06:20

## 2023-05-22 RX ADMIN — ACETAMINOPHEN PRN MG: 325 TABLET ORAL at 10:37

## 2023-05-22 RX ADMIN — INSULIN ASPART SCH UNITS: 100 INJECTION, SOLUTION INTRAVENOUS; SUBCUTANEOUS at 10:59

## 2023-05-22 RX ADMIN — INSULIN ASPART SCH UNITS: 100 INJECTION, SOLUTION INTRAVENOUS; SUBCUTANEOUS at 21:54

## 2023-05-22 RX ADMIN — LIDOCAINE SCH PATCH: 50 PATCH TOPICAL at 10:38

## 2023-05-22 RX ADMIN — FUROSEMIDE SCH: 10 INJECTION, SOLUTION INTRAVENOUS at 10:36

## 2023-05-22 RX ADMIN — INSULIN ASPART SCH UNITS: 100 INJECTION, SOLUTION INTRAVENOUS; SUBCUTANEOUS at 16:52

## 2023-05-22 RX ADMIN — METHIMAZOLE SCH MG: 5 TABLET ORAL at 10:37

## 2023-05-22 RX ADMIN — ALLOPURINOL SCH MG: 100 TABLET ORAL at 10:37

## 2023-05-22 RX ADMIN — SERTRALINE HYDROCHLORIDE SCH MG: 25 TABLET ORAL at 10:37

## 2023-05-22 RX ADMIN — ATORVASTATIN CALCIUM SCH MG: 20 TABLET, FILM COATED ORAL at 21:57

## 2023-05-23 LAB
ALBUMIN MFR FLD ELPH: 2 G/DL
ALBUMIN SERPL-MCNC: 2.8 G/DL (ref 3.4–5)
ALP SERPL-CCNC: 156 U/L (ref 45–117)
ALT SERPL-CCNC: 15 U/L (ref 13–61)
ANION GAP SERPL CALC-SCNC: 7 MMOL/L (ref 8–16)
AST SERPL-CCNC: 25 U/L (ref 15–37)
BASOPHILS # BLD: 0.9 % (ref 0–2)
BILIRUB CONJ SERPL-MCNC: 0.7 MG/DL (ref 0–0.2)
BILIRUB SERPL-MCNC: 1.5 MG/DL (ref 0.2–1)
BUN SERPL-MCNC: 42.8 MG/DL (ref 7–18)
CALCIUM SERPL-MCNC: 9.3 MG/DL (ref 8.5–10.1)
CHLORIDE SERPL-SCNC: 93 MMOL/L (ref 98–107)
CO2 SERPL-SCNC: 35 MMOL/L (ref 21–32)
CREAT SERPL-MCNC: 1.3 MG/DL (ref 0.55–1.3)
DEPRECATED RDW RBC AUTO: 19.1 % (ref 11.6–15.6)
EOSINOPHIL # BLD: 3.4 % (ref 0–4.5)
GLUCOSE SERPL-MCNC: 143 MG/DL (ref 74–106)
HCT VFR BLD CALC: 30.9 % (ref 32.4–45.2)
HGB BLD-MCNC: 9.7 GM/DL (ref 10.7–15.3)
LYMPHOCYTES # BLD: 25 % (ref 8–40)
MAGNESIUM SERPL-MCNC: 2.2 MG/DL (ref 1.8–2.4)
MCH RBC QN AUTO: 23.6 PG (ref 25.7–33.7)
MCHC RBC AUTO-ENTMCNC: 31.3 G/DL (ref 32–36)
MCV RBC: 75.3 FL (ref 80–96)
MONOCYTES # BLD AUTO: 8.8 % (ref 3.8–10.2)
NEUTROPHILS # BLD: 61.9 % (ref 42.8–82.8)
PHOSPHATE SERPL-MCNC: 2.9 MG/DL (ref 2.5–4.9)
PLATELET # BLD AUTO: 158 10^3/UL (ref 134–434)
PMV BLD: 9 FL (ref 7.5–11.1)
POTASSIUM SERPLBLD-SCNC: 4.1 MMOL/L (ref 3.5–5.1)
PROT SERPL-MCNC: 6 G/DL (ref 6.4–8.2)
RBC # BLD AUTO: 4.1 M/MM3 (ref 3.6–5.2)
SODIUM SERPL-SCNC: 135 MMOL/L (ref 136–145)
URATE SERPL-SCNC: 10.6 MG/DL (ref 2.6–7.2)
WBC # BLD AUTO: 5.6 K/MM3 (ref 4–10)

## 2023-05-23 RX ADMIN — INSULIN ASPART SCH UNITS: 100 INJECTION, SOLUTION INTRAVENOUS; SUBCUTANEOUS at 21:31

## 2023-05-23 RX ADMIN — FUROSEMIDE SCH MG: 10 INJECTION, SOLUTION INTRAVENOUS at 08:53

## 2023-05-23 RX ADMIN — ATORVASTATIN CALCIUM SCH MG: 20 TABLET, FILM COATED ORAL at 21:29

## 2023-05-23 RX ADMIN — ALLOPURINOL SCH MG: 100 TABLET ORAL at 10:09

## 2023-05-23 RX ADMIN — INSULIN ASPART SCH UNITS: 100 INJECTION, SOLUTION INTRAVENOUS; SUBCUTANEOUS at 16:39

## 2023-05-23 RX ADMIN — INSULIN ASPART SCH UNITS: 100 INJECTION, SOLUTION INTRAVENOUS; SUBCUTANEOUS at 06:06

## 2023-05-23 RX ADMIN — FERROUS SULFATE TAB EC 324 MG (65 MG FE EQUIVALENT) SCH MG: 324 (65 FE) TABLET DELAYED RESPONSE at 10:08

## 2023-05-23 RX ADMIN — INSULIN ASPART SCH UNITS: 100 INJECTION, SOLUTION INTRAVENOUS; SUBCUTANEOUS at 11:12

## 2023-05-23 RX ADMIN — METHIMAZOLE SCH MG: 5 TABLET ORAL at 10:09

## 2023-05-23 RX ADMIN — Medication SCH EACH: at 21:38

## 2023-05-23 RX ADMIN — SERTRALINE HYDROCHLORIDE SCH MG: 25 TABLET ORAL at 10:09

## 2023-05-23 RX ADMIN — LIDOCAINE SCH PATCH: 50 PATCH TOPICAL at 10:08

## 2023-05-23 RX ADMIN — POLYETHYLENE GLYCOL 3350 SCH: 17 POWDER, FOR SOLUTION ORAL at 13:17

## 2023-05-23 RX ADMIN — POLYETHYLENE GLYCOL 3350 SCH GM: 17 POWDER, FOR SOLUTION ORAL at 21:29

## 2023-05-24 LAB
ALBUMIN SERPL-MCNC: 2.8 G/DL (ref 3.4–5)
ALP SERPL-CCNC: 159 U/L (ref 45–117)
ALT SERPL-CCNC: 15 U/L (ref 13–61)
ANION GAP SERPL CALC-SCNC: 6 MMOL/L (ref 8–16)
AST SERPL-CCNC: 20 U/L (ref 15–37)
BASOPHILS # BLD: 0.6 % (ref 0–2)
BILIRUB SERPL-MCNC: 1.7 MG/DL (ref 0.2–1)
BUN SERPL-MCNC: 36.1 MG/DL (ref 7–18)
CALCIUM SERPL-MCNC: 9.8 MG/DL (ref 8.5–10.1)
CHLORIDE SERPL-SCNC: 95 MMOL/L (ref 98–107)
CO2 SERPL-SCNC: 35 MMOL/L (ref 21–32)
CREAT SERPL-MCNC: 1.1 MG/DL (ref 0.55–1.3)
DEPRECATED RDW RBC AUTO: 19.1 % (ref 11.6–15.6)
EOSINOPHIL # BLD: 3.5 % (ref 0–4.5)
GGT SERPL-CCNC: 128 U/L (ref 5–85)
GLUCOSE SERPL-MCNC: 104 MG/DL (ref 74–106)
HCT VFR BLD CALC: 31.4 % (ref 32.4–45.2)
HGB BLD-MCNC: 10.1 GM/DL (ref 10.7–15.3)
LYMPHOCYTES # BLD: 22 % (ref 8–40)
MAGNESIUM SERPL-MCNC: 2 MG/DL (ref 1.8–2.4)
MCH RBC QN AUTO: 23.8 PG (ref 25.7–33.7)
MCHC RBC AUTO-ENTMCNC: 32 G/DL (ref 32–36)
MCV RBC: 74.4 FL (ref 80–96)
MONOCYTES # BLD AUTO: 8.9 % (ref 3.8–10.2)
NEUTROPHILS # BLD: 65 % (ref 42.8–82.8)
PHOSPHATE SERPL-MCNC: 3 MG/DL (ref 2.5–4.9)
PLATELET # BLD AUTO: 162 10^3/UL (ref 134–434)
PMV BLD: 9.2 FL (ref 7.5–11.1)
POTASSIUM SERPLBLD-SCNC: 4.4 MMOL/L (ref 3.5–5.1)
PROT SERPL-MCNC: 6.1 G/DL (ref 6.4–8.2)
RBC # BLD AUTO: 4.22 M/MM3 (ref 3.6–5.2)
SODIUM SERPL-SCNC: 136 MMOL/L (ref 136–145)
WBC # BLD AUTO: 6.6 K/MM3 (ref 4–10)

## 2023-05-24 RX ADMIN — POLYETHYLENE GLYCOL 3350 SCH GM: 17 POWDER, FOR SOLUTION ORAL at 15:46

## 2023-05-24 RX ADMIN — INSULIN ASPART SCH: 100 INJECTION, SOLUTION INTRAVENOUS; SUBCUTANEOUS at 06:34

## 2023-05-24 RX ADMIN — ATORVASTATIN CALCIUM SCH MG: 20 TABLET, FILM COATED ORAL at 22:55

## 2023-05-24 RX ADMIN — POLYETHYLENE GLYCOL 3350 SCH GM: 17 POWDER, FOR SOLUTION ORAL at 05:19

## 2023-05-24 RX ADMIN — FUROSEMIDE SCH MG: 10 INJECTION, SOLUTION INTRAVENOUS at 10:14

## 2023-05-24 RX ADMIN — METHIMAZOLE SCH MG: 5 TABLET ORAL at 10:15

## 2023-05-24 RX ADMIN — ALLOPURINOL SCH MG: 100 TABLET ORAL at 10:16

## 2023-05-24 RX ADMIN — SERTRALINE HYDROCHLORIDE SCH MG: 25 TABLET ORAL at 10:16

## 2023-05-24 RX ADMIN — ACETAMINOPHEN PRN MG: 325 TABLET ORAL at 06:45

## 2023-05-24 RX ADMIN — POLYETHYLENE GLYCOL 3350 SCH GM: 17 POWDER, FOR SOLUTION ORAL at 22:55

## 2023-05-24 RX ADMIN — FERROUS SULFATE TAB EC 324 MG (65 MG FE EQUIVALENT) SCH MG: 324 (65 FE) TABLET DELAYED RESPONSE at 10:16

## 2023-05-24 RX ADMIN — INSULIN ASPART SCH UNITS: 100 INJECTION, SOLUTION INTRAVENOUS; SUBCUTANEOUS at 12:51

## 2023-05-24 RX ADMIN — LIDOCAINE SCH PATCH: 50 PATCH TOPICAL at 10:15

## 2023-05-24 RX ADMIN — INSULIN ASPART SCH UNITS: 100 INJECTION, SOLUTION INTRAVENOUS; SUBCUTANEOUS at 16:50

## 2023-05-24 RX ADMIN — ACETAMINOPHEN PRN MG: 325 TABLET ORAL at 15:45

## 2023-05-24 RX ADMIN — INSULIN ASPART SCH UNITS: 100 INJECTION, SOLUTION INTRAVENOUS; SUBCUTANEOUS at 22:56

## 2023-05-24 RX ADMIN — Medication SCH EACH: at 22:55

## 2023-05-25 LAB
ALBUMIN SERPL-MCNC: 2.9 G/DL (ref 3.4–5)
ALP SERPL-CCNC: 170 U/L (ref 45–117)
ALT SERPL-CCNC: 15 U/L (ref 13–61)
ANION GAP SERPL CALC-SCNC: 6 MMOL/L (ref 8–16)
AST SERPL-CCNC: 25 U/L (ref 15–37)
BILIRUB SERPL-MCNC: 1.3 MG/DL (ref 0.2–1)
BUN SERPL-MCNC: 41.5 MG/DL (ref 7–18)
CALCIUM SERPL-MCNC: 9.5 MG/DL (ref 8.5–10.1)
CHLORIDE SERPL-SCNC: 95 MMOL/L (ref 98–107)
CO2 SERPL-SCNC: 33 MMOL/L (ref 21–32)
CREAT SERPL-MCNC: 1.2 MG/DL (ref 0.55–1.3)
GLUCOSE SERPL-MCNC: 114 MG/DL (ref 74–106)
MAGNESIUM SERPL-MCNC: 2 MG/DL (ref 1.8–2.4)
PHOSPHATE SERPL-MCNC: 3.2 MG/DL (ref 2.5–4.9)
POTASSIUM SERPLBLD-SCNC: 4.6 MMOL/L (ref 3.5–5.1)
PROT SERPL-MCNC: 6.1 G/DL (ref 6.4–8.2)
SODIUM SERPL-SCNC: 134 MMOL/L (ref 136–145)

## 2023-05-25 RX ADMIN — POLYETHYLENE GLYCOL 3350 SCH GM: 17 POWDER, FOR SOLUTION ORAL at 14:36

## 2023-05-25 RX ADMIN — LIDOCAINE SCH PATCH: 50 PATCH TOPICAL at 09:35

## 2023-05-25 RX ADMIN — INSULIN ASPART SCH: 100 INJECTION, SOLUTION INTRAVENOUS; SUBCUTANEOUS at 06:02

## 2023-05-25 RX ADMIN — POLYETHYLENE GLYCOL 3350 SCH GM: 17 POWDER, FOR SOLUTION ORAL at 22:21

## 2023-05-25 RX ADMIN — Medication SCH EACH: at 22:22

## 2023-05-25 RX ADMIN — INSULIN ASPART SCH UNITS: 100 INJECTION, SOLUTION INTRAVENOUS; SUBCUTANEOUS at 17:55

## 2023-05-25 RX ADMIN — ATORVASTATIN CALCIUM SCH MG: 20 TABLET, FILM COATED ORAL at 22:21

## 2023-05-25 RX ADMIN — FUROSEMIDE SCH MG: 10 INJECTION, SOLUTION INTRAVENOUS at 09:35

## 2023-05-25 RX ADMIN — ALLOPURINOL SCH MG: 100 TABLET ORAL at 09:35

## 2023-05-25 RX ADMIN — INSULIN ASPART SCH UNITS: 100 INJECTION, SOLUTION INTRAVENOUS; SUBCUTANEOUS at 11:50

## 2023-05-25 RX ADMIN — METHIMAZOLE SCH MG: 5 TABLET ORAL at 09:35

## 2023-05-25 RX ADMIN — FERROUS SULFATE TAB EC 324 MG (65 MG FE EQUIVALENT) SCH MG: 324 (65 FE) TABLET DELAYED RESPONSE at 09:35

## 2023-05-25 RX ADMIN — SERTRALINE HYDROCHLORIDE SCH MG: 25 TABLET ORAL at 09:35

## 2023-05-25 RX ADMIN — ACETAMINOPHEN PRN MG: 325 TABLET ORAL at 05:57

## 2023-05-25 RX ADMIN — ACETAMINOPHEN PRN MG: 325 TABLET ORAL at 22:21

## 2023-05-25 RX ADMIN — INSULIN ASPART SCH UNITS: 100 INJECTION, SOLUTION INTRAVENOUS; SUBCUTANEOUS at 22:27

## 2023-05-25 RX ADMIN — POLYETHYLENE GLYCOL 3350 SCH GM: 17 POWDER, FOR SOLUTION ORAL at 05:57

## 2023-05-26 VITALS — TEMPERATURE: 97.3 F | DIASTOLIC BLOOD PRESSURE: 69 MMHG | HEART RATE: 87 BPM | SYSTOLIC BLOOD PRESSURE: 115 MMHG

## 2023-05-26 VITALS — RESPIRATION RATE: 20 BRPM

## 2023-05-26 RX ADMIN — SERTRALINE HYDROCHLORIDE SCH MG: 25 TABLET ORAL at 09:34

## 2023-05-26 RX ADMIN — INSULIN ASPART SCH UNITS: 100 INJECTION, SOLUTION INTRAVENOUS; SUBCUTANEOUS at 17:27

## 2023-05-26 RX ADMIN — ALLOPURINOL SCH MG: 100 TABLET ORAL at 09:34

## 2023-05-26 RX ADMIN — INSULIN ASPART SCH: 100 INJECTION, SOLUTION INTRAVENOUS; SUBCUTANEOUS at 06:01

## 2023-05-26 RX ADMIN — ACETAMINOPHEN PRN MG: 325 TABLET ORAL at 05:54

## 2023-05-26 RX ADMIN — FERROUS SULFATE TAB EC 324 MG (65 MG FE EQUIVALENT) SCH MG: 324 (65 FE) TABLET DELAYED RESPONSE at 09:35

## 2023-05-26 RX ADMIN — METHIMAZOLE SCH MG: 5 TABLET ORAL at 09:35

## 2023-05-26 RX ADMIN — POLYETHYLENE GLYCOL 3350 SCH GM: 17 POWDER, FOR SOLUTION ORAL at 05:45

## 2023-05-26 RX ADMIN — LIDOCAINE SCH PATCH: 50 PATCH TOPICAL at 09:34

## 2023-05-26 RX ADMIN — INSULIN ASPART SCH UNITS: 100 INJECTION, SOLUTION INTRAVENOUS; SUBCUTANEOUS at 11:54

## 2023-05-26 RX ADMIN — POLYETHYLENE GLYCOL 3350 SCH: 17 POWDER, FOR SOLUTION ORAL at 17:48

## 2023-05-26 RX ADMIN — FUROSEMIDE SCH MG: 10 INJECTION, SOLUTION INTRAVENOUS at 09:33

## 2023-06-26 ENCOUNTER — HOSPITAL ENCOUNTER (INPATIENT)
Dept: HOSPITAL 74 - JER | Age: 88
LOS: 10 days | Discharge: NURSING, REHAB OR LONG-TERM CARE | DRG: 291 | End: 2023-07-06
Attending: FAMILY MEDICINE | Admitting: INTERNAL MEDICINE
Payer: COMMERCIAL

## 2023-06-26 VITALS — BODY MASS INDEX: 30.4 KG/M2

## 2023-06-26 DIAGNOSIS — K80.00: ICD-10-CM

## 2023-06-26 DIAGNOSIS — I24.8: ICD-10-CM

## 2023-06-26 DIAGNOSIS — M54.50: ICD-10-CM

## 2023-06-26 DIAGNOSIS — J84.89: ICD-10-CM

## 2023-06-26 DIAGNOSIS — R18.8: ICD-10-CM

## 2023-06-26 DIAGNOSIS — J44.9: ICD-10-CM

## 2023-06-26 DIAGNOSIS — J98.11: ICD-10-CM

## 2023-06-26 DIAGNOSIS — E05.80: ICD-10-CM

## 2023-06-26 DIAGNOSIS — E11.22: ICD-10-CM

## 2023-06-26 DIAGNOSIS — I50.23: ICD-10-CM

## 2023-06-26 DIAGNOSIS — K76.1: ICD-10-CM

## 2023-06-26 DIAGNOSIS — E11.65: ICD-10-CM

## 2023-06-26 DIAGNOSIS — E78.5: ICD-10-CM

## 2023-06-26 DIAGNOSIS — E87.5: ICD-10-CM

## 2023-06-26 DIAGNOSIS — N17.9: ICD-10-CM

## 2023-06-26 DIAGNOSIS — I48.11: ICD-10-CM

## 2023-06-26 DIAGNOSIS — I07.1: ICD-10-CM

## 2023-06-26 DIAGNOSIS — F03.90: ICD-10-CM

## 2023-06-26 DIAGNOSIS — I25.10: ICD-10-CM

## 2023-06-26 DIAGNOSIS — K21.9: ICD-10-CM

## 2023-06-26 DIAGNOSIS — N18.9: ICD-10-CM

## 2023-06-26 DIAGNOSIS — I13.0: Primary | ICD-10-CM

## 2023-06-26 LAB
ALBUMIN SERPL-MCNC: 3.2 G/DL (ref 3.4–5)
ALP SERPL-CCNC: 182 U/L (ref 45–117)
ALT SERPL-CCNC: 14 U/L (ref 13–61)
ANION GAP SERPL CALC-SCNC: 8 MMOL/L (ref 8–16)
ANISOCYTOSIS BLD QL: (no result)
APTT BLD: 26.8 SECONDS (ref 25.2–36.5)
AST SERPL-CCNC: 12 U/L (ref 15–37)
BASE EXCESS BLDV CALC-SCNC: -0.5 MMOL/L (ref -2–2)
BASOPHILS # BLD: 0 % (ref 0–2)
BILIRUB SERPL-MCNC: 1.4 MG/DL (ref 0.2–1)
BUN SERPL-MCNC: 69.2 MG/DL (ref 7–18)
CALCIUM SERPL-MCNC: 9.9 MG/DL (ref 8.5–10.1)
CHLORIDE SERPL-SCNC: 94 MMOL/L (ref 98–107)
CO2 SERPL-SCNC: 31 MMOL/L (ref 21–32)
CREAT SERPL-MCNC: 1.9 MG/DL (ref 0.55–1.3)
DEPRECATED RDW RBC AUTO: 22 % (ref 11.6–15.6)
EOSINOPHIL # BLD: 0 % (ref 0–4.5)
GLUCOSE SERPL-MCNC: 669 MG/DL (ref 74–106)
HCT VFR BLD CALC: 37.6 % (ref 32.4–45.2)
HCT VFR BLDV CALC: 37 % (ref 32.4–45.2)
HGB BLD-MCNC: 11.3 GM/DL (ref 10.7–15.3)
INR BLD: 1.77 (ref 0.83–1.09)
LACTATE SERPL-MCNC: 2.5 MMOL/L (ref 0.4–2)
LIPASE SERPL-CCNC: 166 U/L (ref 73–393)
LYMPHOCYTES # BLD: 3.7 % (ref 8–40)
MACROCYTES BLD QL: (no result)
MAGNESIUM SERPL-MCNC: 2.2 MG/DL (ref 1.8–2.4)
MCH RBC QN AUTO: 23.2 PG (ref 25.7–33.7)
MCHC RBC AUTO-ENTMCNC: 30 G/DL (ref 32–36)
MCV RBC: 77.1 FL (ref 80–96)
MONOCYTES # BLD AUTO: 3.3 % (ref 3.8–10.2)
NEUTROPHILS # BLD: 93 % (ref 42.8–82.8)
OVALOCYTES BLD QL SMEAR: (no result)
PCO2 BLDV: 55.5 MMHG (ref 38–52)
PH BLDV: 7.3 [PH] (ref 7.31–7.41)
PLATELET # BLD AUTO: 189 10^3/UL (ref 134–434)
PMV BLD: 8.6 FL (ref 7.5–11.1)
POTASSIUM SERPLBLD-SCNC: 6.3 MMOL/L (ref 3.5–5.1)
PROT SERPL-MCNC: 6.6 G/DL (ref 6.4–8.2)
PT PNL PPP: 20.4 SEC (ref 9.7–13)
RBC # BLD AUTO: 4.88 M/MM3 (ref 3.6–5.2)
SAO2 % BLDV: 75.7 % (ref 70–80)
SODIUM SERPL-SCNC: 134 MMOL/L (ref 136–145)
TARGETS BLD QL SMEAR: (no result)
WBC # BLD AUTO: 6.7 K/MM3 (ref 4–10)

## 2023-06-26 PROCEDURE — C1769 GUIDE WIRE: HCPCS

## 2023-06-26 PROCEDURE — A4358 URINARY LEG OR ABDOMEN BAG: HCPCS

## 2023-06-26 PROCEDURE — A9537 TC99M MEBROFENIN: HCPCS

## 2023-06-26 PROCEDURE — C1729 CATH, DRAINAGE: HCPCS

## 2023-06-27 LAB
ANION GAP SERPL CALC-SCNC: 7 MMOL/L (ref 8–16)
APPEARANCE UR: CLEAR
BACTERIA # UR AUTO: 136 /UL (ref 0–1359)
BASOPHILS # BLD: 0.2 % (ref 0–2)
BILIRUB UR STRIP.AUTO-MCNC: NEGATIVE MG/DL
BUN SERPL-MCNC: 65.8 MG/DL (ref 7–18)
CALCIUM SERPL-MCNC: 10.5 MG/DL (ref 8.5–10.1)
CASTS URNS QL MICRO: 0 /UL (ref 0–3.1)
CHLORIDE SERPL-SCNC: 96 MMOL/L (ref 98–107)
CO2 SERPL-SCNC: 35 MMOL/L (ref 21–32)
COLOR UR: YELLOW
CREAT SERPL-MCNC: 1.8 MG/DL (ref 0.55–1.3)
DEPRECATED RDW RBC AUTO: 21.3 % (ref 11.6–15.6)
EOSINOPHIL # BLD: 0 % (ref 0–4.5)
EPITH CASTS URNS QL MICRO: 1 /UL (ref 0–25.1)
GLUCOSE SERPL-MCNC: 436 MG/DL (ref 74–106)
HCT VFR BLD CALC: 38.4 % (ref 32.4–45.2)
HGB BLD-MCNC: 11.5 GM/DL (ref 10.7–15.3)
INR BLD: 1.54 (ref 0.83–1.09)
KETONES UR QL STRIP: NEGATIVE
LEUKOCYTE ESTERASE UR QL STRIP.AUTO: (no result)
LYMPHOCYTES # BLD: 5.3 % (ref 8–40)
MAGNESIUM SERPL-MCNC: 2.1 MG/DL (ref 1.8–2.4)
MCH RBC QN AUTO: 23.3 PG (ref 25.7–33.7)
MCHC RBC AUTO-ENTMCNC: 30 G/DL (ref 32–36)
MCV RBC: 77.9 FL (ref 80–96)
MONOCYTES # BLD AUTO: 3.8 % (ref 3.8–10.2)
NEUTROPHILS # BLD: 90.7 % (ref 42.8–82.8)
NITRITE UR QL STRIP: NEGATIVE
PH UR: 5.5 [PH] (ref 5–8)
PHOSPHATE SERPL-MCNC: 4.2 MG/DL (ref 2.5–4.9)
PLATELET # BLD AUTO: 196 10^3/UL (ref 134–434)
PMV BLD: 8.7 FL (ref 7.5–11.1)
POTASSIUM SERPLBLD-SCNC: 4.7 MMOL/L (ref 3.5–5.1)
PROT UR QL STRIP: (no result)
PROT UR QL STRIP: NEGATIVE
PT PNL PPP: 17.8 SEC (ref 9.7–13)
RBC # BLD AUTO: 4.93 M/MM3 (ref 3.6–5.2)
RBC # BLD AUTO: 7 /UL (ref 0–23.9)
SODIUM SERPL-SCNC: 138 MMOL/L (ref 136–145)
SP GR UR: 1.02 (ref 1.01–1.03)
UROBILINOGEN UR STRIP-MCNC: 0.2 MG/DL (ref 0.2–1)
WBC # BLD AUTO: 5.2 K/MM3 (ref 4–10)
WBC # UR AUTO: 358 /UL (ref 0–25.8)
YEAST BUDDING URNS QL: (no result)

## 2023-06-27 RX ADMIN — SPIRONOLACTONE SCH MG: 25 TABLET, FILM COATED ORAL at 10:25

## 2023-06-27 RX ADMIN — INSULIN ASPART SCH UNITS: 100 INJECTION, SOLUTION INTRAVENOUS; SUBCUTANEOUS at 10:10

## 2023-06-27 RX ADMIN — POLYETHYLENE GLYCOL 3350 SCH: 17 POWDER, FOR SOLUTION ORAL at 06:21

## 2023-06-27 RX ADMIN — POLYETHYLENE GLYCOL 3350 SCH GM: 17 POWDER, FOR SOLUTION ORAL at 14:30

## 2023-06-27 RX ADMIN — PIPERACILLIN SODIUM AND TAZOBACTAM SODIUM SCH MLS/HR: .25; 2 INJECTION, POWDER, LYOPHILIZED, FOR SOLUTION INTRAVENOUS at 18:39

## 2023-06-27 RX ADMIN — SERTRALINE HYDROCHLORIDE SCH MG: 25 TABLET ORAL at 10:25

## 2023-06-27 RX ADMIN — PIPERACILLIN SODIUM AND TAZOBACTAM SODIUM SCH MLS/HR: .25; 2 INJECTION, POWDER, LYOPHILIZED, FOR SOLUTION INTRAVENOUS at 01:00

## 2023-06-27 RX ADMIN — FUROSEMIDE SCH MG: 10 INJECTION, SOLUTION INTRAVENOUS at 06:21

## 2023-06-27 RX ADMIN — POLYETHYLENE GLYCOL 3350 SCH GM: 17 POWDER, FOR SOLUTION ORAL at 22:31

## 2023-06-27 RX ADMIN — METHIMAZOLE SCH MG: 5 TABLET ORAL at 15:40

## 2023-06-27 RX ADMIN — SENNOSIDES SCH TAB: 8.6 TABLET, FILM COATED ORAL at 10:24

## 2023-06-27 RX ADMIN — FUROSEMIDE SCH MG: 10 INJECTION, SOLUTION INTRAVENOUS at 17:17

## 2023-06-27 RX ADMIN — PANTOPRAZOLE SODIUM SCH MG: 40 TABLET, DELAYED RELEASE ORAL at 10:24

## 2023-06-27 RX ADMIN — SENNOSIDES SCH TAB: 8.6 TABLET, FILM COATED ORAL at 22:30

## 2023-06-27 RX ADMIN — INSULIN ASPART SCH UNITS: 100 INJECTION, SOLUTION INTRAVENOUS; SUBCUTANEOUS at 22:34

## 2023-06-27 RX ADMIN — INSULIN ASPART SCH UNITS: 100 INJECTION, SOLUTION INTRAVENOUS; SUBCUTANEOUS at 06:23

## 2023-06-27 RX ADMIN — INSULIN ASPART SCH UNITS: 100 INJECTION, SOLUTION INTRAVENOUS; SUBCUTANEOUS at 02:24

## 2023-06-27 RX ADMIN — PIPERACILLIN SODIUM AND TAZOBACTAM SODIUM SCH MLS/HR: .25; 2 INJECTION, POWDER, LYOPHILIZED, FOR SOLUTION INTRAVENOUS at 03:09

## 2023-06-27 RX ADMIN — INSULIN ASPART SCH UNITS: 100 INJECTION, SOLUTION INTRAVENOUS; SUBCUTANEOUS at 17:23

## 2023-06-27 RX ADMIN — INSULIN ASPART SCH UNITS: 100 INJECTION, SOLUTION INTRAVENOUS; SUBCUTANEOUS at 15:15

## 2023-06-27 RX ADMIN — FERROUS SULFATE TAB EC 324 MG (65 MG FE EQUIVALENT) SCH MG: 324 (65 FE) TABLET DELAYED RESPONSE at 10:25

## 2023-06-28 LAB
ALBUMIN SERPL-MCNC: 3.1 G/DL (ref 3.4–5)
ALP SERPL-CCNC: 147 U/L (ref 45–117)
ALT SERPL-CCNC: 15 U/L (ref 13–61)
ANION GAP SERPL CALC-SCNC: 5 MMOL/L (ref 8–16)
AST SERPL-CCNC: 12 U/L (ref 15–37)
BASOPHILS # BLD: 0.8 % (ref 0–2)
BILIRUB SERPL-MCNC: 2.3 MG/DL (ref 0.2–1)
BUN SERPL-MCNC: 62.3 MG/DL (ref 7–18)
CALCIUM SERPL-MCNC: 10.2 MG/DL (ref 8.5–10.1)
CHLORIDE SERPL-SCNC: 95 MMOL/L (ref 98–107)
CO2 SERPL-SCNC: 40 MMOL/L (ref 21–32)
CREAT SERPL-MCNC: 1.4 MG/DL (ref 0.55–1.3)
DEPRECATED RDW RBC AUTO: 21.9 % (ref 11.6–15.6)
EOSINOPHIL # BLD: 0.5 % (ref 0–4.5)
GLUCOSE SERPL-MCNC: 186 MG/DL (ref 74–106)
HCT VFR BLD CALC: 41.8 % (ref 32.4–45.2)
HGB BLD-MCNC: 12.4 GM/DL (ref 10.7–15.3)
LYMPHOCYTES # BLD: 7.1 % (ref 8–40)
MCH RBC QN AUTO: 22.8 PG (ref 25.7–33.7)
MCHC RBC AUTO-ENTMCNC: 29.8 G/DL (ref 32–36)
MCV RBC: 76.7 FL (ref 80–96)
MONOCYTES # BLD AUTO: 5.5 % (ref 3.8–10.2)
NEUTROPHILS # BLD: 86.1 % (ref 42.8–82.8)
PLATELET # BLD AUTO: 237 10^3/UL (ref 134–434)
PMV BLD: 8.3 FL (ref 7.5–11.1)
POTASSIUM SERPLBLD-SCNC: 4.4 MMOL/L (ref 3.5–5.1)
PROT SERPL-MCNC: 6.2 G/DL (ref 6.4–8.2)
RBC # BLD AUTO: 5.45 M/MM3 (ref 3.6–5.2)
SODIUM SERPL-SCNC: 140 MMOL/L (ref 136–145)
WBC # BLD AUTO: 10.6 K/MM3 (ref 4–10)

## 2023-06-28 RX ADMIN — FUROSEMIDE SCH MG: 10 INJECTION, SOLUTION INTRAVENOUS at 06:56

## 2023-06-28 RX ADMIN — PIPERACILLIN SODIUM AND TAZOBACTAM SODIUM SCH MLS/HR: .25; 2 INJECTION, POWDER, LYOPHILIZED, FOR SOLUTION INTRAVENOUS at 06:56

## 2023-06-28 RX ADMIN — POLYETHYLENE GLYCOL 3350 SCH GM: 17 POWDER, FOR SOLUTION ORAL at 22:34

## 2023-06-28 RX ADMIN — INSULIN DETEMIR SCH UNITS: 100 INJECTION, SOLUTION SUBCUTANEOUS at 06:57

## 2023-06-28 RX ADMIN — METHIMAZOLE SCH MG: 5 TABLET ORAL at 10:50

## 2023-06-28 RX ADMIN — POLYETHYLENE GLYCOL 3350 SCH GM: 17 POWDER, FOR SOLUTION ORAL at 06:57

## 2023-06-28 RX ADMIN — SPIRONOLACTONE SCH MG: 25 TABLET, FILM COATED ORAL at 10:30

## 2023-06-28 RX ADMIN — SERTRALINE HYDROCHLORIDE SCH MG: 25 TABLET ORAL at 10:30

## 2023-06-28 RX ADMIN — INSULIN ASPART SCH: 100 INJECTION, SOLUTION INTRAVENOUS; SUBCUTANEOUS at 22:33

## 2023-06-28 RX ADMIN — FUROSEMIDE SCH MG: 10 INJECTION, SOLUTION INTRAVENOUS at 17:04

## 2023-06-28 RX ADMIN — PANTOPRAZOLE SODIUM SCH MG: 40 TABLET, DELAYED RELEASE ORAL at 10:30

## 2023-06-28 RX ADMIN — PIPERACILLIN SODIUM AND TAZOBACTAM SODIUM SCH MLS/HR: .25; 2 INJECTION, POWDER, LYOPHILIZED, FOR SOLUTION INTRAVENOUS at 01:13

## 2023-06-28 RX ADMIN — INSULIN ASPART SCH: 100 INJECTION, SOLUTION INTRAVENOUS; SUBCUTANEOUS at 03:04

## 2023-06-28 RX ADMIN — SENNOSIDES SCH TAB: 8.6 TABLET, FILM COATED ORAL at 10:30

## 2023-06-28 RX ADMIN — FERROUS SULFATE TAB EC 324 MG (65 MG FE EQUIVALENT) SCH MG: 324 (65 FE) TABLET DELAYED RESPONSE at 10:30

## 2023-06-28 RX ADMIN — POLYETHYLENE GLYCOL 3350 SCH GM: 17 POWDER, FOR SOLUTION ORAL at 15:00

## 2023-06-28 RX ADMIN — INSULIN ASPART SCH UNITS: 100 INJECTION, SOLUTION INTRAVENOUS; SUBCUTANEOUS at 11:15

## 2023-06-28 RX ADMIN — INSULIN ASPART SCH UNITS: 100 INJECTION, SOLUTION INTRAVENOUS; SUBCUTANEOUS at 06:57

## 2023-06-28 RX ADMIN — INSULIN ASPART SCH: 100 INJECTION, SOLUTION INTRAVENOUS; SUBCUTANEOUS at 17:49

## 2023-06-28 RX ADMIN — PIPERACILLIN SODIUM AND TAZOBACTAM SODIUM SCH MLS/HR: .25; 2 INJECTION, POWDER, LYOPHILIZED, FOR SOLUTION INTRAVENOUS at 12:08

## 2023-06-28 RX ADMIN — INSULIN ASPART SCH: 100 INJECTION, SOLUTION INTRAVENOUS; SUBCUTANEOUS at 14:30

## 2023-06-29 LAB
ALBUMIN SERPL-MCNC: 2.9 G/DL (ref 3.4–5)
ALP SERPL-CCNC: 146 U/L (ref 45–117)
ALT SERPL-CCNC: 14 U/L (ref 13–61)
ANION GAP SERPL CALC-SCNC: 6 MMOL/L (ref 8–16)
AST SERPL-CCNC: 18 U/L (ref 15–37)
BASOPHILS # BLD: 0.5 % (ref 0–2)
BILIRUB SERPL-MCNC: 3 MG/DL (ref 0.2–1)
BUN SERPL-MCNC: 55.1 MG/DL (ref 7–18)
CALCIUM SERPL-MCNC: 10.2 MG/DL (ref 8.5–10.1)
CHLORIDE SERPL-SCNC: 98 MMOL/L (ref 98–107)
CO2 SERPL-SCNC: 41 MMOL/L (ref 21–32)
CREAT SERPL-MCNC: 1.3 MG/DL (ref 0.55–1.3)
DEPRECATED RDW RBC AUTO: 21.9 % (ref 11.6–15.6)
EOSINOPHIL # BLD: 3.5 % (ref 0–4.5)
EOSINOPHIL NFR PLR MANUAL: 1 %
GLUCOSE SERPL-MCNC: 78 MG/DL (ref 74–106)
HCT VFR BLD CALC: 41.1 % (ref 32.4–45.2)
HGB BLD-MCNC: 12.7 GM/DL (ref 10.7–15.3)
LYMPHOCYTES # BLD: 12.9 % (ref 8–40)
MACROPHAGES NFR PLR MANUAL: 10 %
MCH RBC QN AUTO: 23.4 PG (ref 25.7–33.7)
MCHC RBC AUTO-ENTMCNC: 30.9 G/DL (ref 32–36)
MCV RBC: 75.8 FL (ref 80–96)
MESOTHL CELL NFR PLR MANUAL: 16 %
MONOCYTES # BLD AUTO: 6.5 % (ref 3.8–10.2)
MONOCYTES NFR PLR MANUAL: 30 %
NEUTROPHILS # BLD: 76.6 % (ref 42.8–82.8)
NUC CELL # FLD: 571 /MM3
PLATELET # BLD AUTO: 234 10^3/UL (ref 134–434)
PMV BLD: 8.4 FL (ref 7.5–11.1)
POTASSIUM SERPLBLD-SCNC: 4.5 MMOL/L (ref 3.5–5.1)
PROT SERPL-MCNC: 6 G/DL (ref 6.4–8.2)
RBC # BLD AUTO: 5.42 M/MM3 (ref 3.6–5.2)
SODIUM SERPL-SCNC: 144 MMOL/L (ref 136–145)
WBC # BLD AUTO: 11.4 K/MM3 (ref 4–10)

## 2023-06-29 PROCEDURE — 0W9G3ZZ DRAINAGE OF PERITONEAL CAVITY, PERCUTANEOUS APPROACH: ICD-10-PCS | Performed by: RADIOLOGY

## 2023-06-29 RX ADMIN — METHIMAZOLE SCH: 5 TABLET ORAL at 11:00

## 2023-06-29 RX ADMIN — FUROSEMIDE SCH MG: 10 INJECTION, SOLUTION INTRAVENOUS at 17:10

## 2023-06-29 RX ADMIN — PIPERACILLIN SODIUM AND TAZOBACTAM SODIUM SCH MLS/HR: .25; 2 INJECTION, POWDER, LYOPHILIZED, FOR SOLUTION INTRAVENOUS at 22:08

## 2023-06-29 RX ADMIN — INSULIN ASPART SCH: 100 INJECTION, SOLUTION INTRAVENOUS; SUBCUTANEOUS at 22:09

## 2023-06-29 RX ADMIN — POLYETHYLENE GLYCOL 3350 SCH: 17 POWDER, FOR SOLUTION ORAL at 13:45

## 2023-06-29 RX ADMIN — FUROSEMIDE SCH: 10 INJECTION, SOLUTION INTRAVENOUS at 06:35

## 2023-06-29 RX ADMIN — SPIRONOLACTONE SCH: 25 TABLET, FILM COATED ORAL at 11:00

## 2023-06-29 RX ADMIN — PIPERACILLIN SODIUM AND TAZOBACTAM SODIUM SCH MLS/HR: .25; 2 INJECTION, POWDER, LYOPHILIZED, FOR SOLUTION INTRAVENOUS at 17:09

## 2023-06-29 RX ADMIN — INSULIN ASPART SCH: 100 INJECTION, SOLUTION INTRAVENOUS; SUBCUTANEOUS at 06:35

## 2023-06-29 RX ADMIN — PANTOPRAZOLE SODIUM SCH: 40 TABLET, DELAYED RELEASE ORAL at 11:00

## 2023-06-29 RX ADMIN — POLYETHYLENE GLYCOL 3350 SCH GM: 17 POWDER, FOR SOLUTION ORAL at 22:09

## 2023-06-29 RX ADMIN — POLYETHYLENE GLYCOL 3350 SCH: 17 POWDER, FOR SOLUTION ORAL at 06:35

## 2023-06-29 RX ADMIN — INSULIN ASPART SCH: 100 INJECTION, SOLUTION INTRAVENOUS; SUBCUTANEOUS at 02:48

## 2023-06-29 RX ADMIN — INSULIN ASPART SCH: 100 INJECTION, SOLUTION INTRAVENOUS; SUBCUTANEOUS at 13:46

## 2023-06-29 RX ADMIN — INSULIN DETEMIR SCH: 100 INJECTION, SOLUTION SUBCUTANEOUS at 06:35

## 2023-06-29 RX ADMIN — INSULIN ASPART SCH: 100 INJECTION, SOLUTION INTRAVENOUS; SUBCUTANEOUS at 17:23

## 2023-06-29 RX ADMIN — SERTRALINE HYDROCHLORIDE SCH: 25 TABLET ORAL at 11:01

## 2023-06-29 RX ADMIN — INSULIN ASPART SCH: 100 INJECTION, SOLUTION INTRAVENOUS; SUBCUTANEOUS at 10:59

## 2023-06-30 LAB
ANION GAP SERPL CALC-SCNC: 7 MMOL/L (ref 8–16)
ARTERIAL PATENCY WRIST A: POSITIVE
BASE EXCESS BLDA CALC-SCNC: 12.4 MMOL/L (ref -2–2)
BUN SERPL-MCNC: 46.8 MG/DL (ref 7–18)
CALCIUM SERPL-MCNC: 10.2 MG/DL (ref 8.5–10.1)
CHLORIDE SERPL-SCNC: 95 MMOL/L (ref 98–107)
CO2 SERPL-SCNC: 42 MMOL/L (ref 21–32)
CREAT SERPL-MCNC: 1.2 MG/DL (ref 0.55–1.3)
DEPRECATED RDW RBC AUTO: 22.2 % (ref 11.6–15.6)
GLUCOSE SERPL-MCNC: 139 MG/DL (ref 74–106)
HCT VFR BLD CALC: 42.1 % (ref 32.4–45.2)
HCT VFR BLDV CALC: 41 % (ref 32.4–45.2)
HGB BLD-MCNC: 12.8 GM/DL (ref 10.7–15.3)
MCH RBC QN AUTO: 23.2 PG (ref 25.7–33.7)
MCHC RBC AUTO-ENTMCNC: 30.3 G/DL (ref 32–36)
MCV RBC: 76.7 FL (ref 80–96)
PCO2 BLDA: 53.2 MMHG (ref 35–45)
PLATELET # BLD AUTO: 219 10^3/UL (ref 134–434)
PMV BLD: 8.3 FL (ref 7.5–11.1)
PO2 BLDA: 85.9 MMHG (ref 80–100)
POTASSIUM SERPLBLD-SCNC: 4.2 MMOL/L (ref 3.5–5.1)
RBC # BLD AUTO: 5.49 M/MM3 (ref 3.6–5.2)
SAO2 % BLDA: 96.9 % (ref 95–98)
SODIUM SERPL-SCNC: 143 MMOL/L (ref 136–145)
WBC # BLD AUTO: 11.3 K/MM3 (ref 4–10)

## 2023-06-30 PROCEDURE — 0F9430Z DRAINAGE OF GALLBLADDER WITH DRAINAGE DEVICE, PERCUTANEOUS APPROACH: ICD-10-PCS | Performed by: RADIOLOGY

## 2023-06-30 RX ADMIN — FUROSEMIDE SCH MG: 10 INJECTION, SOLUTION INTRAVENOUS at 05:18

## 2023-06-30 RX ADMIN — POLYETHYLENE GLYCOL 3350 SCH: 17 POWDER, FOR SOLUTION ORAL at 21:03

## 2023-06-30 RX ADMIN — INSULIN DETEMIR SCH: 100 INJECTION, SOLUTION SUBCUTANEOUS at 06:02

## 2023-06-30 RX ADMIN — INSULIN ASPART SCH: 100 INJECTION, SOLUTION INTRAVENOUS; SUBCUTANEOUS at 16:54

## 2023-06-30 RX ADMIN — PIPERACILLIN SODIUM AND TAZOBACTAM SODIUM SCH MLS/HR: .25; 2 INJECTION, POWDER, LYOPHILIZED, FOR SOLUTION INTRAVENOUS at 16:47

## 2023-06-30 RX ADMIN — PIPERACILLIN SODIUM AND TAZOBACTAM SODIUM SCH MLS/HR: .25; 2 INJECTION, POWDER, LYOPHILIZED, FOR SOLUTION INTRAVENOUS at 05:18

## 2023-06-30 RX ADMIN — INSULIN ASPART SCH: 100 INJECTION, SOLUTION INTRAVENOUS; SUBCUTANEOUS at 21:08

## 2023-06-30 RX ADMIN — INSULIN ASPART SCH: 100 INJECTION, SOLUTION INTRAVENOUS; SUBCUTANEOUS at 12:45

## 2023-06-30 RX ADMIN — PIPERACILLIN SODIUM AND TAZOBACTAM SODIUM SCH MLS/HR: .25; 2 INJECTION, POWDER, LYOPHILIZED, FOR SOLUTION INTRAVENOUS at 11:18

## 2023-06-30 RX ADMIN — SERTRALINE HYDROCHLORIDE SCH MG: 25 TABLET ORAL at 10:01

## 2023-06-30 RX ADMIN — FUROSEMIDE SCH MG: 10 INJECTION, SOLUTION INTRAVENOUS at 17:00

## 2023-06-30 RX ADMIN — PANTOPRAZOLE SODIUM SCH MG: 40 TABLET, DELAYED RELEASE ORAL at 10:00

## 2023-06-30 RX ADMIN — SPIRONOLACTONE SCH: 25 TABLET, FILM COATED ORAL at 10:02

## 2023-06-30 RX ADMIN — POLYETHYLENE GLYCOL 3350 SCH: 17 POWDER, FOR SOLUTION ORAL at 13:14

## 2023-06-30 RX ADMIN — PIPERACILLIN SODIUM AND TAZOBACTAM SODIUM SCH MLS/HR: .25; 2 INJECTION, POWDER, LYOPHILIZED, FOR SOLUTION INTRAVENOUS at 22:22

## 2023-06-30 RX ADMIN — INSULIN ASPART SCH: 100 INJECTION, SOLUTION INTRAVENOUS; SUBCUTANEOUS at 06:02

## 2023-06-30 RX ADMIN — POLYETHYLENE GLYCOL 3350 SCH: 17 POWDER, FOR SOLUTION ORAL at 05:44

## 2023-06-30 RX ADMIN — METHIMAZOLE SCH MG: 5 TABLET ORAL at 10:01

## 2023-07-01 LAB
ANION GAP SERPL CALC-SCNC: 9 MMOL/L (ref 8–16)
BUN SERPL-MCNC: 44.1 MG/DL (ref 7–18)
CALCIUM SERPL-MCNC: 9.8 MG/DL (ref 8.5–10.1)
CHLORIDE SERPL-SCNC: 93 MMOL/L (ref 98–107)
CO2 SERPL-SCNC: 39 MMOL/L (ref 21–32)
CREAT SERPL-MCNC: 1.3 MG/DL (ref 0.55–1.3)
DEPRECATED RDW RBC AUTO: 22 % (ref 11.6–15.6)
GLUCOSE SERPL-MCNC: 178 MG/DL (ref 74–106)
HCT VFR BLD CALC: 39 % (ref 32.4–45.2)
HGB BLD-MCNC: 12 GM/DL (ref 10.7–15.3)
MCH RBC QN AUTO: 23.7 PG (ref 25.7–33.7)
MCHC RBC AUTO-ENTMCNC: 30.9 G/DL (ref 32–36)
MCV RBC: 76.8 FL (ref 80–96)
PLATELET # BLD AUTO: 185 10^3/UL (ref 134–434)
PMV BLD: 8.8 FL (ref 7.5–11.1)
POTASSIUM SERPLBLD-SCNC: 4 MMOL/L (ref 3.5–5.1)
RBC # BLD AUTO: 5.08 M/MM3 (ref 3.6–5.2)
SODIUM SERPL-SCNC: 142 MMOL/L (ref 136–145)
TSI ACT/NOR SER: <0.1 IU/L (ref 0–0.55)
WBC # BLD AUTO: 10.3 K/MM3 (ref 4–10)

## 2023-07-01 RX ADMIN — FUROSEMIDE SCH MG: 10 INJECTION, SOLUTION INTRAVENOUS at 05:30

## 2023-07-01 RX ADMIN — PIPERACILLIN SODIUM AND TAZOBACTAM SODIUM SCH MLS/HR: .25; 2 INJECTION, POWDER, LYOPHILIZED, FOR SOLUTION INTRAVENOUS at 04:53

## 2023-07-01 RX ADMIN — SPIRONOLACTONE SCH MG: 25 TABLET, FILM COATED ORAL at 09:18

## 2023-07-01 RX ADMIN — INSULIN DETEMIR SCH UNITS: 100 INJECTION, SOLUTION SUBCUTANEOUS at 08:18

## 2023-07-01 RX ADMIN — POLYETHYLENE GLYCOL 3350 SCH: 17 POWDER, FOR SOLUTION ORAL at 21:15

## 2023-07-01 RX ADMIN — PANTOPRAZOLE SODIUM SCH MG: 40 TABLET, DELAYED RELEASE ORAL at 09:18

## 2023-07-01 RX ADMIN — INSULIN ASPART SCH UNITS: 100 INJECTION, SOLUTION INTRAVENOUS; SUBCUTANEOUS at 17:25

## 2023-07-01 RX ADMIN — INSULIN ASPART SCH UNITS: 100 INJECTION, SOLUTION INTRAVENOUS; SUBCUTANEOUS at 21:20

## 2023-07-01 RX ADMIN — METHIMAZOLE SCH MG: 5 TABLET ORAL at 09:18

## 2023-07-01 RX ADMIN — SERTRALINE HYDROCHLORIDE SCH MG: 25 TABLET ORAL at 09:18

## 2023-07-01 RX ADMIN — POLYETHYLENE GLYCOL 3350 SCH: 17 POWDER, FOR SOLUTION ORAL at 13:56

## 2023-07-01 RX ADMIN — INSULIN ASPART SCH: 100 INJECTION, SOLUTION INTRAVENOUS; SUBCUTANEOUS at 06:26

## 2023-07-01 RX ADMIN — FUROSEMIDE SCH MG: 10 INJECTION, SOLUTION INTRAVENOUS at 17:26

## 2023-07-01 RX ADMIN — POLYETHYLENE GLYCOL 3350 SCH: 17 POWDER, FOR SOLUTION ORAL at 05:30

## 2023-07-01 RX ADMIN — PIPERACILLIN SODIUM AND TAZOBACTAM SODIUM SCH MLS/HR: .25; 2 INJECTION, POWDER, LYOPHILIZED, FOR SOLUTION INTRAVENOUS at 10:20

## 2023-07-01 RX ADMIN — PIPERACILLIN SODIUM AND TAZOBACTAM SODIUM SCH MLS/HR: .25; 2 INJECTION, POWDER, LYOPHILIZED, FOR SOLUTION INTRAVENOUS at 17:25

## 2023-07-01 RX ADMIN — PIPERACILLIN SODIUM AND TAZOBACTAM SODIUM SCH MLS/HR: .25; 2 INJECTION, POWDER, LYOPHILIZED, FOR SOLUTION INTRAVENOUS at 22:20

## 2023-07-01 RX ADMIN — INSULIN ASPART SCH UNITS: 100 INJECTION, SOLUTION INTRAVENOUS; SUBCUTANEOUS at 11:12

## 2023-07-02 LAB
ALBUMIN SERPL-MCNC: 2.9 G/DL (ref 3.4–5)
ALP SERPL-CCNC: 142 U/L (ref 45–117)
ALT SERPL-CCNC: 10 U/L (ref 13–61)
ANION GAP SERPL CALC-SCNC: 11 MMOL/L (ref 8–16)
AST SERPL-CCNC: 19 U/L (ref 15–37)
BILIRUB SERPL-MCNC: 3.2 MG/DL (ref 0.2–1)
BUN SERPL-MCNC: 40.1 MG/DL (ref 7–18)
CALCIUM SERPL-MCNC: 9.8 MG/DL (ref 8.5–10.1)
CHLORIDE SERPL-SCNC: 92 MMOL/L (ref 98–107)
CO2 SERPL-SCNC: 38 MMOL/L (ref 21–32)
CREAT SERPL-MCNC: 1.4 MG/DL (ref 0.55–1.3)
DEPRECATED RDW RBC AUTO: 22.7 % (ref 11.6–15.6)
GLUCOSE SERPL-MCNC: 123 MG/DL (ref 74–106)
HCT VFR BLD CALC: 40 % (ref 32.4–45.2)
HGB BLD-MCNC: 12.1 GM/DL (ref 10.7–15.3)
MCH RBC QN AUTO: 23.2 PG (ref 25.7–33.7)
MCHC RBC AUTO-ENTMCNC: 30.3 G/DL (ref 32–36)
MCV RBC: 76.6 FL (ref 80–96)
PLATELET # BLD AUTO: 166 10^3/UL (ref 134–434)
PMV BLD: 8.8 FL (ref 7.5–11.1)
POTASSIUM SERPLBLD-SCNC: 3.7 MMOL/L (ref 3.5–5.1)
PROT SERPL-MCNC: 6.1 G/DL (ref 6.4–8.2)
RBC # BLD AUTO: 5.22 M/MM3 (ref 3.6–5.2)
SODIUM SERPL-SCNC: 140 MMOL/L (ref 136–145)
WBC # BLD AUTO: 10.8 K/MM3 (ref 4–10)

## 2023-07-02 RX ADMIN — METHIMAZOLE SCH MG: 5 TABLET ORAL at 09:52

## 2023-07-02 RX ADMIN — POLYETHYLENE GLYCOL 3350 SCH: 17 POWDER, FOR SOLUTION ORAL at 14:38

## 2023-07-02 RX ADMIN — PIPERACILLIN SODIUM AND TAZOBACTAM SODIUM SCH MLS/HR: .25; 2 INJECTION, POWDER, LYOPHILIZED, FOR SOLUTION INTRAVENOUS at 05:27

## 2023-07-02 RX ADMIN — INSULIN ASPART SCH UNITS: 100 INJECTION, SOLUTION INTRAVENOUS; SUBCUTANEOUS at 12:22

## 2023-07-02 RX ADMIN — PIPERACILLIN SODIUM AND TAZOBACTAM SODIUM SCH MLS/HR: .25; 2 INJECTION, POWDER, LYOPHILIZED, FOR SOLUTION INTRAVENOUS at 23:12

## 2023-07-02 RX ADMIN — PANTOPRAZOLE SODIUM SCH MG: 40 TABLET, DELAYED RELEASE ORAL at 09:52

## 2023-07-02 RX ADMIN — SERTRALINE HYDROCHLORIDE SCH MG: 25 TABLET ORAL at 09:52

## 2023-07-02 RX ADMIN — PIPERACILLIN SODIUM AND TAZOBACTAM SODIUM SCH MLS/HR: .25; 2 INJECTION, POWDER, LYOPHILIZED, FOR SOLUTION INTRAVENOUS at 17:41

## 2023-07-02 RX ADMIN — FUROSEMIDE SCH MG: 10 INJECTION, SOLUTION INTRAVENOUS at 05:51

## 2023-07-02 RX ADMIN — PIPERACILLIN SODIUM AND TAZOBACTAM SODIUM SCH MLS/HR: .25; 2 INJECTION, POWDER, LYOPHILIZED, FOR SOLUTION INTRAVENOUS at 12:22

## 2023-07-02 RX ADMIN — SPIRONOLACTONE SCH MG: 25 TABLET, FILM COATED ORAL at 09:51

## 2023-07-02 RX ADMIN — INSULIN ASPART SCH UNITS: 100 INJECTION, SOLUTION INTRAVENOUS; SUBCUTANEOUS at 17:41

## 2023-07-02 RX ADMIN — INSULIN ASPART SCH UNITS: 100 INJECTION, SOLUTION INTRAVENOUS; SUBCUTANEOUS at 21:31

## 2023-07-02 RX ADMIN — INSULIN ASPART SCH: 100 INJECTION, SOLUTION INTRAVENOUS; SUBCUTANEOUS at 06:25

## 2023-07-02 RX ADMIN — POLYETHYLENE GLYCOL 3350 SCH: 17 POWDER, FOR SOLUTION ORAL at 21:24

## 2023-07-02 RX ADMIN — POLYETHYLENE GLYCOL 3350 SCH: 17 POWDER, FOR SOLUTION ORAL at 05:50

## 2023-07-02 RX ADMIN — INSULIN DETEMIR SCH UNITS: 100 INJECTION, SOLUTION SUBCUTANEOUS at 07:26

## 2023-07-02 RX ADMIN — FUROSEMIDE SCH MG: 10 INJECTION, SOLUTION INTRAVENOUS at 17:41

## 2023-07-03 LAB — ALBUMIN MFR FLD ELPH: 2.6 G/DL

## 2023-07-03 RX ADMIN — POLYETHYLENE GLYCOL 3350 SCH: 17 POWDER, FOR SOLUTION ORAL at 06:32

## 2023-07-03 RX ADMIN — PANTOPRAZOLE SODIUM SCH MG: 40 TABLET, DELAYED RELEASE ORAL at 10:04

## 2023-07-03 RX ADMIN — PIPERACILLIN SODIUM AND TAZOBACTAM SODIUM SCH MLS/HR: .25; 2 INJECTION, POWDER, LYOPHILIZED, FOR SOLUTION INTRAVENOUS at 10:56

## 2023-07-03 RX ADMIN — POLYETHYLENE GLYCOL 3350 SCH: 17 POWDER, FOR SOLUTION ORAL at 22:12

## 2023-07-03 RX ADMIN — SPIRONOLACTONE SCH MG: 25 TABLET, FILM COATED ORAL at 10:04

## 2023-07-03 RX ADMIN — POLYETHYLENE GLYCOL 3350 SCH: 17 POWDER, FOR SOLUTION ORAL at 14:12

## 2023-07-03 RX ADMIN — INSULIN ASPART SCH: 100 INJECTION, SOLUTION INTRAVENOUS; SUBCUTANEOUS at 06:37

## 2023-07-03 RX ADMIN — INSULIN ASPART SCH UNITS: 100 INJECTION, SOLUTION INTRAVENOUS; SUBCUTANEOUS at 11:50

## 2023-07-03 RX ADMIN — PIPERACILLIN SODIUM AND TAZOBACTAM SODIUM SCH MLS/HR: .25; 2 INJECTION, POWDER, LYOPHILIZED, FOR SOLUTION INTRAVENOUS at 06:31

## 2023-07-03 RX ADMIN — INSULIN DETEMIR SCH UNITS: 100 INJECTION, SOLUTION SUBCUTANEOUS at 06:36

## 2023-07-03 RX ADMIN — PIPERACILLIN SODIUM AND TAZOBACTAM SODIUM SCH MLS/HR: .25; 2 INJECTION, POWDER, LYOPHILIZED, FOR SOLUTION INTRAVENOUS at 17:46

## 2023-07-03 RX ADMIN — SERTRALINE HYDROCHLORIDE SCH MG: 25 TABLET ORAL at 10:04

## 2023-07-03 RX ADMIN — FUROSEMIDE SCH MG: 10 INJECTION, SOLUTION INTRAVENOUS at 14:13

## 2023-07-03 RX ADMIN — INSULIN ASPART SCH UNITS: 100 INJECTION, SOLUTION INTRAVENOUS; SUBCUTANEOUS at 17:46

## 2023-07-03 RX ADMIN — PIPERACILLIN SODIUM AND TAZOBACTAM SODIUM SCH MLS/HR: .25; 2 INJECTION, POWDER, LYOPHILIZED, FOR SOLUTION INTRAVENOUS at 22:12

## 2023-07-03 RX ADMIN — INSULIN ASPART SCH UNITS: 100 INJECTION, SOLUTION INTRAVENOUS; SUBCUTANEOUS at 22:20

## 2023-07-03 RX ADMIN — METHIMAZOLE SCH MG: 5 TABLET ORAL at 10:04

## 2023-07-04 LAB
ALBUMIN SERPL-MCNC: 2.5 G/DL (ref 3.4–5)
ALP SERPL-CCNC: 137 U/L (ref 45–117)
ALT SERPL-CCNC: 11 U/L (ref 13–61)
ANION GAP SERPL CALC-SCNC: 6 MMOL/L (ref 8–16)
ANISOCYTOSIS BLD QL: (no result)
AST SERPL-CCNC: 15 U/L (ref 15–37)
BASOPHILS # BLD: 1 % (ref 0–2)
BILIRUB SERPL-MCNC: 3 MG/DL (ref 0.2–1)
BUN SERPL-MCNC: 37.8 MG/DL (ref 7–18)
CALCIUM SERPL-MCNC: 9.9 MG/DL (ref 8.5–10.1)
CHLORIDE SERPL-SCNC: 93 MMOL/L (ref 98–107)
CO2 SERPL-SCNC: 37 MMOL/L (ref 21–32)
CREAT SERPL-MCNC: 1.5 MG/DL (ref 0.55–1.3)
DEPRECATED RDW RBC AUTO: 23 % (ref 11.6–15.6)
EOSINOPHIL # BLD: 2.5 % (ref 0–4.5)
GLUCOSE SERPL-MCNC: 111 MG/DL (ref 74–106)
HCT VFR BLD CALC: 37.6 % (ref 32.4–45.2)
HGB BLD-MCNC: 11.5 GM/DL (ref 10.7–15.3)
LYMPHOCYTES # BLD: 14.1 % (ref 8–40)
MACROCYTES BLD QL: 0
MCH RBC QN AUTO: 23.4 PG (ref 25.7–33.7)
MCHC RBC AUTO-ENTMCNC: 30.6 G/DL (ref 32–36)
MCV RBC: 76.6 FL (ref 80–96)
MONOCYTES # BLD AUTO: 9.3 % (ref 3.8–10.2)
NEUTROPHILS # BLD: 73.1 % (ref 42.8–82.8)
PLATELET # BLD AUTO: 154 10^3/UL (ref 134–434)
PMV BLD: 9.3 FL (ref 7.5–11.1)
POTASSIUM SERPLBLD-SCNC: 3.9 MMOL/L (ref 3.5–5.1)
PROT SERPL-MCNC: 5.8 G/DL (ref 6.4–8.2)
RBC # BLD AUTO: 4.9 M/MM3 (ref 3.6–5.2)
SODIUM SERPL-SCNC: 137 MMOL/L (ref 136–145)
WBC # BLD AUTO: 11.8 K/MM3 (ref 4–10)

## 2023-07-04 RX ADMIN — SPIRONOLACTONE SCH MG: 25 TABLET, FILM COATED ORAL at 10:21

## 2023-07-04 RX ADMIN — FUROSEMIDE SCH MG: 10 INJECTION, SOLUTION INTRAVENOUS at 10:21

## 2023-07-04 RX ADMIN — PIPERACILLIN SODIUM AND TAZOBACTAM SODIUM SCH MLS/HR: .25; 2 INJECTION, POWDER, LYOPHILIZED, FOR SOLUTION INTRAVENOUS at 04:36

## 2023-07-04 RX ADMIN — INSULIN ASPART SCH UNITS: 100 INJECTION, SOLUTION INTRAVENOUS; SUBCUTANEOUS at 12:08

## 2023-07-04 RX ADMIN — INSULIN ASPART SCH: 100 INJECTION, SOLUTION INTRAVENOUS; SUBCUTANEOUS at 21:40

## 2023-07-04 RX ADMIN — PANTOPRAZOLE SODIUM SCH MG: 40 TABLET, DELAYED RELEASE ORAL at 10:21

## 2023-07-04 RX ADMIN — METHIMAZOLE SCH MG: 5 TABLET ORAL at 10:21

## 2023-07-04 RX ADMIN — INSULIN DETEMIR SCH UNITS: 100 INJECTION, SOLUTION SUBCUTANEOUS at 06:06

## 2023-07-04 RX ADMIN — INSULIN ASPART SCH: 100 INJECTION, SOLUTION INTRAVENOUS; SUBCUTANEOUS at 06:07

## 2023-07-04 RX ADMIN — POLYETHYLENE GLYCOL 3350 SCH: 17 POWDER, FOR SOLUTION ORAL at 05:27

## 2023-07-04 RX ADMIN — INSULIN ASPART SCH UNITS: 100 INJECTION, SOLUTION INTRAVENOUS; SUBCUTANEOUS at 16:25

## 2023-07-04 RX ADMIN — PIPERACILLIN SODIUM AND TAZOBACTAM SODIUM SCH MLS/HR: .25; 2 INJECTION, POWDER, LYOPHILIZED, FOR SOLUTION INTRAVENOUS at 12:08

## 2023-07-04 RX ADMIN — POLYETHYLENE GLYCOL 3350 SCH GM: 17 POWDER, FOR SOLUTION ORAL at 21:40

## 2023-07-04 RX ADMIN — POLYETHYLENE GLYCOL 3350 SCH: 17 POWDER, FOR SOLUTION ORAL at 14:25

## 2023-07-04 RX ADMIN — SERTRALINE HYDROCHLORIDE SCH MG: 25 TABLET ORAL at 10:21

## 2023-07-05 LAB
ALBUMIN SERPL-MCNC: 2.5 G/DL (ref 3.4–5)
ALP SERPL-CCNC: 138 U/L (ref 45–117)
ALT SERPL-CCNC: 12 U/L (ref 13–61)
ANION GAP SERPL CALC-SCNC: 9 MMOL/L (ref 8–16)
AST SERPL-CCNC: 16 U/L (ref 15–37)
BASOPHILS # BLD: 0.9 % (ref 0–2)
BILIRUB SERPL-MCNC: 3.4 MG/DL (ref 0.2–1)
BUN SERPL-MCNC: 40.4 MG/DL (ref 7–18)
CALCIUM SERPL-MCNC: 10 MG/DL (ref 8.5–10.1)
CHLORIDE SERPL-SCNC: 93 MMOL/L (ref 98–107)
CO2 SERPL-SCNC: 34 MMOL/L (ref 21–32)
CREAT SERPL-MCNC: 1.6 MG/DL (ref 0.55–1.3)
DEPRECATED RDW RBC AUTO: 23.3 % (ref 11.6–15.6)
EOSINOPHIL # BLD: 2 % (ref 0–4.5)
GLUCOSE SERPL-MCNC: 112 MG/DL (ref 74–106)
HCT VFR BLD CALC: 36.7 % (ref 32.4–45.2)
HGB BLD-MCNC: 11.3 GM/DL (ref 10.7–15.3)
LYMPHOCYTES # BLD: 14.9 % (ref 8–40)
MCH RBC QN AUTO: 23.6 PG (ref 25.7–33.7)
MCHC RBC AUTO-ENTMCNC: 30.8 G/DL (ref 32–36)
MCV RBC: 76.5 FL (ref 80–96)
MONOCYTES # BLD AUTO: 9.3 % (ref 3.8–10.2)
NEUTROPHILS # BLD: 72.9 % (ref 42.8–82.8)
PLATELET # BLD AUTO: 143 10^3/UL (ref 134–434)
PMV BLD: 9.3 FL (ref 7.5–11.1)
POTASSIUM SERPLBLD-SCNC: 3.9 MMOL/L (ref 3.5–5.1)
PROT SERPL-MCNC: 5.9 G/DL (ref 6.4–8.2)
RBC # BLD AUTO: 4.8 M/MM3 (ref 3.6–5.2)
SODIUM SERPL-SCNC: 136 MMOL/L (ref 136–145)
WBC # BLD AUTO: 10.9 K/MM3 (ref 4–10)

## 2023-07-05 RX ADMIN — POLYETHYLENE GLYCOL 3350 SCH: 17 POWDER, FOR SOLUTION ORAL at 13:04

## 2023-07-05 RX ADMIN — INSULIN ASPART SCH: 100 INJECTION, SOLUTION INTRAVENOUS; SUBCUTANEOUS at 12:34

## 2023-07-05 RX ADMIN — INSULIN ASPART SCH UNITS: 100 INJECTION, SOLUTION INTRAVENOUS; SUBCUTANEOUS at 21:43

## 2023-07-05 RX ADMIN — PANTOPRAZOLE SODIUM SCH MG: 40 TABLET, DELAYED RELEASE ORAL at 10:17

## 2023-07-05 RX ADMIN — POLYETHYLENE GLYCOL 3350 SCH: 17 POWDER, FOR SOLUTION ORAL at 21:45

## 2023-07-05 RX ADMIN — METHIMAZOLE SCH MG: 5 TABLET ORAL at 10:18

## 2023-07-05 RX ADMIN — INSULIN ASPART SCH: 100 INJECTION, SOLUTION INTRAVENOUS; SUBCUTANEOUS at 06:17

## 2023-07-05 RX ADMIN — FUROSEMIDE SCH MG: 40 TABLET ORAL at 10:18

## 2023-07-05 RX ADMIN — INSULIN DETEMIR SCH UNITS: 100 INJECTION, SOLUTION SUBCUTANEOUS at 06:17

## 2023-07-05 RX ADMIN — INSULIN ASPART SCH UNITS: 100 INJECTION, SOLUTION INTRAVENOUS; SUBCUTANEOUS at 18:02

## 2023-07-05 RX ADMIN — SERTRALINE HYDROCHLORIDE SCH MG: 25 TABLET ORAL at 10:18

## 2023-07-05 RX ADMIN — SPIRONOLACTONE SCH MG: 25 TABLET, FILM COATED ORAL at 10:17

## 2023-07-05 RX ADMIN — POLYETHYLENE GLYCOL 3350 SCH GM: 17 POWDER, FOR SOLUTION ORAL at 06:17

## 2023-07-06 VITALS — HEART RATE: 84 BPM | RESPIRATION RATE: 18 BRPM

## 2023-07-06 VITALS — TEMPERATURE: 98.7 F | DIASTOLIC BLOOD PRESSURE: 62 MMHG | SYSTOLIC BLOOD PRESSURE: 102 MMHG

## 2023-07-06 LAB
ANION GAP SERPL CALC-SCNC: 8 MMOL/L (ref 8–16)
BUN SERPL-MCNC: 44 MG/DL (ref 7–18)
CALCIUM SERPL-MCNC: 10.1 MG/DL (ref 8.5–10.1)
CHLORIDE SERPL-SCNC: 93 MMOL/L (ref 98–107)
CO2 SERPL-SCNC: 33 MMOL/L (ref 21–32)
CREAT SERPL-MCNC: 1.6 MG/DL (ref 0.55–1.3)
GLUCOSE SERPL-MCNC: 124 MG/DL (ref 74–106)
POTASSIUM SERPLBLD-SCNC: 4.2 MMOL/L (ref 3.5–5.1)
SODIUM SERPL-SCNC: 135 MMOL/L (ref 136–145)

## 2023-07-06 RX ADMIN — SERTRALINE HYDROCHLORIDE SCH MG: 25 TABLET ORAL at 10:30

## 2023-07-06 RX ADMIN — INSULIN ASPART SCH: 100 INJECTION, SOLUTION INTRAVENOUS; SUBCUTANEOUS at 06:54

## 2023-07-06 RX ADMIN — POLYETHYLENE GLYCOL 3350 SCH: 17 POWDER, FOR SOLUTION ORAL at 14:22

## 2023-07-06 RX ADMIN — INSULIN DETEMIR SCH UNITS: 100 INJECTION, SOLUTION SUBCUTANEOUS at 06:53

## 2023-07-06 RX ADMIN — FUROSEMIDE SCH MG: 40 TABLET ORAL at 10:30

## 2023-07-06 RX ADMIN — POLYETHYLENE GLYCOL 3350 SCH GM: 17 POWDER, FOR SOLUTION ORAL at 06:53

## 2023-07-06 RX ADMIN — PANTOPRAZOLE SODIUM SCH MG: 40 TABLET, DELAYED RELEASE ORAL at 10:30

## 2023-07-06 RX ADMIN — SPIRONOLACTONE SCH MG: 25 TABLET, FILM COATED ORAL at 10:30

## 2023-07-06 RX ADMIN — INSULIN ASPART SCH UNITS: 100 INJECTION, SOLUTION INTRAVENOUS; SUBCUTANEOUS at 11:00

## 2023-07-06 RX ADMIN — INSULIN ASPART SCH: 100 INJECTION, SOLUTION INTRAVENOUS; SUBCUTANEOUS at 17:30

## 2023-07-06 RX ADMIN — METHIMAZOLE SCH MG: 5 TABLET ORAL at 10:30

## 2023-07-31 ENCOUNTER — HOSPITAL ENCOUNTER (OUTPATIENT)
Dept: HOSPITAL 74 - JRADIR | Age: 88
Discharge: HOME | End: 2023-07-31
Attending: PHYSICIAN ASSISTANT
Payer: COMMERCIAL

## 2023-07-31 DIAGNOSIS — Z53.8: ICD-10-CM

## 2023-07-31 DIAGNOSIS — R18.8: Primary | ICD-10-CM

## 2023-07-31 LAB
ANISOCYTOSIS BLD QL: (no result)
BASOPHILS # BLD: 0.7 % (ref 0–2)
DEPRECATED RDW RBC AUTO: 21.7 % (ref 11.6–15.6)
EOSINOPHIL # BLD: 1.6 % (ref 0–4.5)
HCT VFR BLD CALC: 33.8 % (ref 32.4–45.2)
HGB BLD-MCNC: 10.9 GM/DL (ref 10.7–15.3)
INR BLD: 1.71 (ref 0.83–1.09)
LYMPHOCYTES # BLD: 23.3 % (ref 8–40)
MACROCYTES BLD QL: 0
MCH RBC QN AUTO: 25 PG (ref 25.7–33.7)
MCHC RBC AUTO-ENTMCNC: 32.3 G/DL (ref 32–36)
MCV RBC: 77.5 FL (ref 80–96)
MONOCYTES # BLD AUTO: 7.5 % (ref 3.8–10.2)
NEUTROPHILS # BLD: 66.9 % (ref 42.8–82.8)
PLATELET # BLD AUTO: 190 10^3/UL (ref 134–434)
PMV BLD: 8.4 FL (ref 7.5–11.1)
PT PNL PPP: 19.7 SEC (ref 9.7–13)
RBC # BLD AUTO: 4.36 M/MM3 (ref 3.6–5.2)
WBC # BLD AUTO: 6.3 K/MM3 (ref 4–10)

## 2023-07-31 PROCEDURE — 0W9G3ZX DRAINAGE OF PERITONEAL CAVITY, PERCUTANEOUS APPROACH, DIAGNOSTIC: ICD-10-PCS | Performed by: RADIOLOGY

## 2023-08-17 ENCOUNTER — HOSPITAL ENCOUNTER (OUTPATIENT)
Dept: HOSPITAL 74 - JRADIR | Age: 88
Discharge: HOME | End: 2023-08-17
Attending: PHYSICIAN ASSISTANT
Payer: COMMERCIAL

## 2023-08-17 VITALS — TEMPERATURE: 98.2 F | DIASTOLIC BLOOD PRESSURE: 52 MMHG | HEART RATE: 58 BPM | SYSTOLIC BLOOD PRESSURE: 116 MMHG

## 2023-08-17 VITALS — BODY MASS INDEX: 29.9 KG/M2

## 2023-08-17 VITALS — RESPIRATION RATE: 18 BRPM

## 2023-08-17 DIAGNOSIS — K81.9: ICD-10-CM

## 2023-08-17 DIAGNOSIS — R18.8: Primary | ICD-10-CM

## 2023-08-17 LAB
ANISOCYTOSIS BLD QL: (no result)
DEPRECATED RDW RBC AUTO: 21.7 % (ref 11.6–15.6)
HCT VFR BLD CALC: 32.8 % (ref 32.4–45.2)
HGB BLD-MCNC: 10.1 GM/DL (ref 10.7–15.3)
INR BLD: 1.44 (ref 0.83–1.09)
MACROCYTES BLD QL: 0
MCH RBC QN AUTO: 25.1 PG (ref 25.7–33.7)
MCHC RBC AUTO-ENTMCNC: 30.7 G/DL (ref 32–36)
MCV RBC: 81.7 FL (ref 80–96)
PLATELET # BLD AUTO: 199 10^3/UL (ref 134–434)
PMV BLD: 8.1 FL (ref 7.5–11.1)
PT PNL PPP: 16.7 SEC (ref 9.7–13)
RBC # BLD AUTO: 4.01 M/MM3 (ref 3.6–5.2)
WBC # BLD AUTO: 6.2 K/MM3 (ref 4–10)

## 2023-08-17 PROCEDURE — 0W9G30Z DRAINAGE OF PERITONEAL CAVITY WITH DRAINAGE DEVICE, PERCUTANEOUS APPROACH: ICD-10-PCS | Performed by: STUDENT IN AN ORGANIZED HEALTH CARE EDUCATION/TRAINING PROGRAM

## 2023-08-17 PROCEDURE — C1769 GUIDE WIRE: HCPCS

## 2023-08-17 PROCEDURE — C1729 CATH, DRAINAGE: HCPCS

## 2023-09-08 ENCOUNTER — HOSPITAL ENCOUNTER (EMERGENCY)
Dept: HOSPITAL 74 - JER | Age: 88
End: 2023-09-08
Payer: COMMERCIAL

## 2023-09-08 VITALS — BODY MASS INDEX: 31.8 KG/M2

## 2023-09-08 DIAGNOSIS — I46.9: Primary | ICD-10-CM
